# Patient Record
Sex: FEMALE | Race: WHITE | NOT HISPANIC OR LATINO | Employment: FULL TIME | URBAN - METROPOLITAN AREA
[De-identification: names, ages, dates, MRNs, and addresses within clinical notes are randomized per-mention and may not be internally consistent; named-entity substitution may affect disease eponyms.]

---

## 2023-05-10 PROBLEM — O09.43 SUPERVISION OF HIGH-RISK PREGNANCY WITH GRAND MULTIPARITY IN THIRD TRIMESTER: Status: ACTIVE | Noted: 2023-05-10

## 2023-05-15 PROBLEM — O09.299 HX OF PREECLAMPSIA, PRIOR PREGNANCY, CURRENTLY PREGNANT: Status: ACTIVE | Noted: 2022-12-06

## 2023-05-30 ENCOUNTER — ROUTINE PRENATAL (OUTPATIENT)
Dept: OBSTETRICS AND GYNECOLOGY | Facility: CLINIC | Age: 38
End: 2023-05-30

## 2023-05-30 ENCOUNTER — HOSPITAL ENCOUNTER (OUTPATIENT)
Facility: HOSPITAL | Age: 38
Discharge: HOME | End: 2023-05-30
Attending: OBSTETRICS & GYNECOLOGY | Admitting: OBSTETRICS & GYNECOLOGY

## 2023-05-30 VITALS
HEART RATE: 114 BPM | TEMPERATURE: 98.8 F | RESPIRATION RATE: 18 BRPM | WEIGHT: 215 LBS | DIASTOLIC BLOOD PRESSURE: 83 MMHG | SYSTOLIC BLOOD PRESSURE: 132 MMHG | HEIGHT: 64 IN | BODY MASS INDEX: 36.7 KG/M2 | OXYGEN SATURATION: 96 %

## 2023-05-30 VITALS — DIASTOLIC BLOOD PRESSURE: 78 MMHG | SYSTOLIC BLOOD PRESSURE: 124 MMHG | WEIGHT: 215 LBS | BODY MASS INDEX: 36.9 KG/M2

## 2023-05-30 DIAGNOSIS — O09.43 SUPERVISION OF HIGH-RISK PREGNANCY WITH GRAND MULTIPARITY IN THIRD TRIMESTER: ICD-10-CM

## 2023-05-30 DIAGNOSIS — O24.410 DIET CONTROLLED GESTATIONAL DIABETES MELLITUS (GDM) IN THIRD TRIMESTER: Primary | ICD-10-CM

## 2023-05-30 PROBLEM — R07.81 RIB PAIN: Status: ACTIVE | Noted: 2023-05-30

## 2023-05-30 LAB
ALP SERPL-CCNC: 120 U/L (ref 39–117)
ALT SERPL W P-5'-P-CCNC: 37 U/L (ref 1–33)
AST SERPL-CCNC: 31 U/L (ref 1–32)
BILIRUB SERPL-MCNC: 0.7 MG/DL (ref 0–1.2)
CREAT SERPL-MCNC: 0.48 MG/DL (ref 0.57–1)
DEPRECATED RDW RBC AUTO: 44.6 FL (ref 37–54)
ERYTHROCYTE [DISTWIDTH] IN BLOOD BY AUTOMATED COUNT: 12.9 % (ref 12.3–15.4)
EXPIRATION DATE: NORMAL
GLUCOSE BLDC GLUCOMTR-MCNC: 148 MG/DL (ref 70–130)
HCT VFR BLD AUTO: 27.7 % (ref 34–46.6)
HGB BLD-MCNC: 9.3 G/DL (ref 12–15.9)
LDH SERPL-CCNC: 148 U/L (ref 135–214)
Lab: NORMAL
MCH RBC QN AUTO: 32 PG (ref 26.6–33)
MCHC RBC AUTO-ENTMCNC: 33.6 G/DL (ref 31.5–35.7)
MCV RBC AUTO: 95.2 FL (ref 79–97)
PLATELET # BLD AUTO: 160 10*3/MM3 (ref 140–450)
PMV BLD AUTO: 11.2 FL (ref 6–12)
PROT UR STRIP-MCNC: NEGATIVE MG/DL
RBC # BLD AUTO: 2.91 10*6/MM3 (ref 3.77–5.28)
URATE SERPL-MCNC: 6.6 MG/DL (ref 2.4–5.7)
WBC NRBC COR # BLD: 11.66 10*3/MM3 (ref 3.4–10.8)

## 2023-05-30 PROCEDURE — 85027 COMPLETE CBC AUTOMATED: CPT | Performed by: OBSTETRICS & GYNECOLOGY

## 2023-05-30 PROCEDURE — G0463 HOSPITAL OUTPT CLINIC VISIT: HCPCS

## 2023-05-30 PROCEDURE — 84460 ALANINE AMINO (ALT) (SGPT): CPT | Performed by: OBSTETRICS & GYNECOLOGY

## 2023-05-30 PROCEDURE — 83615 LACTATE (LD) (LDH) ENZYME: CPT | Performed by: OBSTETRICS & GYNECOLOGY

## 2023-05-30 PROCEDURE — 59025 FETAL NON-STRESS TEST: CPT

## 2023-05-30 PROCEDURE — 84075 ASSAY ALKALINE PHOSPHATASE: CPT | Performed by: OBSTETRICS & GYNECOLOGY

## 2023-05-30 PROCEDURE — 84550 ASSAY OF BLOOD/URIC ACID: CPT | Performed by: OBSTETRICS & GYNECOLOGY

## 2023-05-30 PROCEDURE — 0502F SUBSEQUENT PRENATAL CARE: CPT | Performed by: OBSTETRICS & GYNECOLOGY

## 2023-05-30 PROCEDURE — 81002 URINALYSIS NONAUTO W/O SCOPE: CPT | Performed by: OBSTETRICS & GYNECOLOGY

## 2023-05-30 PROCEDURE — 84450 TRANSFERASE (AST) (SGOT): CPT | Performed by: OBSTETRICS & GYNECOLOGY

## 2023-05-30 PROCEDURE — 82247 BILIRUBIN TOTAL: CPT | Performed by: OBSTETRICS & GYNECOLOGY

## 2023-05-30 PROCEDURE — 82948 REAGENT STRIP/BLOOD GLUCOSE: CPT

## 2023-05-30 PROCEDURE — 36415 COLL VENOUS BLD VENIPUNCTURE: CPT | Performed by: OBSTETRICS & GYNECOLOGY

## 2023-05-30 PROCEDURE — 82565 ASSAY OF CREATININE: CPT | Performed by: OBSTETRICS & GYNECOLOGY

## 2023-05-30 NOTE — PATIENT INSTRUCTIONS
Prenatal Care  Prenatal care is health care during pregnancy. It helps you and your unborn baby (fetus) stay as healthy as possible. Prenatal care may be provided by a midwife, a family practice doctor, a mid-level practitioner (nurse practitioner or physician assistant), or a childbirth and pregnancy doctor (obstetrician).  How does this affect me?  During pregnancy, you will be closely monitored for any new conditions that might develop. To lower your risk of pregnancy complications, you and your health care provider will talk about any underlying conditions you have.  How does this affect my baby?  Early and consistent prenatal care increases the chance that your baby will be healthy during pregnancy. Prenatal care lowers the risk that your baby will be:  Born early (prematurely).  Smaller than expected at birth (small for gestational age).  What can I expect at the first prenatal care visit?  Your first prenatal care visit will likely be the longest. You should schedule your first prenatal care visit as soon as you know that you are pregnant. Your first visit is a good time to talk about any questions or concerns you have about pregnancy.  Medical history  At your visit, you and your health care provider will talk about your medical history, including:  Any past pregnancies.  Your family's medical history.  Medical history of the baby's father.  Any long-term (chronic) health conditions you have and how you manage them.  Any surgeries or procedures you have had.  Any current over-the-counter or prescription medicines, herbs, or supplements that you are taking.  Other factors that could pose a risk to your baby, including:  Exposure to harmful chemicals or radiation at work or at home.  Any substance use, including tobacco, alcohol, and drug use.  Your home setting and your stress levels, including:  Exposure to abuse or violence.  Household financial strain.  Your daily health habits, including diet and  exercise.  Tests and screenings  Your health care provider will:  Measure your weight, height, and blood pressure.  Do a physical exam, including a pelvic and breast exam.  Perform blood tests and urine tests to check for:  Urinary tract infection.  Sexually transmitted infections (STIs).  Low iron levels in your blood (anemia).  Blood type and certain proteins on red blood cells (Rh antibodies).  Infections and immunity to viruses, such as hepatitis B and rubella.  HIV (human immunodeficiency virus).  Discuss your options for genetic screening.  Tips about staying healthy  Your health care provider will also give you information about how to keep yourself and your baby healthy, including:  Nutrition and taking vitamins.  Physical activity.  How to manage pregnancy symptoms such as nausea and vomiting (morning sickness).  Infections and substances that may be harmful to your baby and how to avoid them.  Food safety.  Dental care.  Working.  Travel.  Warning signs to watch for and when to call your health care provider.  How often will I have prenatal care visits?  After your first prenatal care visit, you will have regular visits throughout your pregnancy. The visit schedule is often as follows:  Up to week 28 of pregnancy: once every 4 weeks.  28-36 weeks: once every 2 weeks.  After 36 weeks: every week until delivery.  Some women may have visits more or less often depending on any underlying health conditions and the health of the baby.  Keep all follow-up and prenatal care visits. This is important.  What happens during routine prenatal care visits?  Your health care provider will:  Measure your weight and blood pressure.  Check for fetal heart sounds.  Measure the height of your uterus in your abdomen (fundal height). This may be measured starting around week 20 of pregnancy.  Check the position of your baby inside your uterus.  Ask questions about your diet, sleeping patterns, and whether you can feel the baby  move.  Review warning signs to watch for and signs of labor.  Ask about any pregnancy symptoms you are having and how you are dealing with them. Symptoms may include:  Headaches.  Nausea and vomiting.  Vaginal discharge.  Swelling.  Fatigue.  Constipation.  Changes in your vision.  Feeling persistently sad or anxious.  Any discomfort, including back or pelvic pain.  Bleeding or spotting.  Make a list of questions to ask your health care provider at your routine visits.  What tests might I have during prenatal care visits?  You may have blood, urine, and imaging tests throughout your pregnancy, such as:  Urine tests to check for glucose, protein, or signs of infection.  Glucose tests to check for a form of diabetes that can develop during pregnancy (gestational diabetes mellitus). This is usually done around week 24 of pregnancy.  Ultrasounds to check your baby's growth and development, to check for birth defects, and to check your baby's well-being. These can also help to decide when you should deliver your baby.  A test to check for group B strep (GBS) infection. This is usually done around week 36 of pregnancy.  Genetic testing. This may include blood, fluid, or tissue sampling, or imaging tests, such as an ultrasound. Some genetic tests are done during the first trimester and some are done during the second trimester.  What else can I expect during prenatal care visits?  Your health care provider may recommend getting certain vaccines during pregnancy. These may include:  A yearly flu shot (annual influenza vaccine). This is especially important if you will be pregnant during flu season.  Tdap (tetanus, diphtheria, pertussis) vaccine. Getting this vaccine during pregnancy can protect your baby from whooping cough (pertussis) after birth. This vaccine may be recommended between weeks 27 and 36 of pregnancy.  A COVID-19 vaccine.  Later in your pregnancy, your health care provider may give you information  about:  Childbirth and breastfeeding classes.  Choosing a health care provider for your baby.  Umbilical cord banking.  Breastfeeding.  Birth control after your baby is born.  The hospital labor and delivery unit and how to set up a tour.  Registering at the hospital before you go into labor.  Where to find more information  Office on Women's Health: womenshealth.gov  American Pregnancy Association: americanpregnancy.org  March of Dimes: marchofdimes.org  Summary  Prenatal care helps you and your baby stay as healthy as possible during pregnancy.  Your first prenatal care visit will most likely be the longest.  You will have visits and tests throughout your pregnancy to monitor your health and your baby's health.  Bring a list of questions to your visits to ask your health care provider.  Make sure to keep all follow-up and prenatal care visits.  This information is not intended to replace advice given to you by your health care provider. Make sure you discuss any questions you have with your health care provider.  Document Revised: 09/30/2021 Document Reviewed: 09/30/2021  [employer] Patient Education © 2022 [employer]

## 2023-05-30 NOTE — PROGRESS NOTES
OB FOLLOW UP  CC- Here for care of pregnancy        Karen Garrido is a 37 y.o.  38w1d patient being seen today for her obstetrical follow up visit. Patient reports Headache that is sometimes relieved by Tylenol or rest, she denies vision changes. She reports sharp chest pain on exhale under her ribs, lower back pain, denies dysuria. Patient reports pelvic pain. Patient reports nausea and heartburn but not currently taking medications.     Her prenatal care is complicated by (and status) : AMA, Chronic HTN. Her average BP has been 140-90s . and GDM diet controlled. Her average fasting BG is 80-90. Her average 2hr PP BG is 90s  Patient Active Problem List   Diagnosis   • Diet controlled gestational diabetes mellitus (GDM) in third trimester   • Gestational hypertension, third trimester   • Supervision of high-risk pregnancy with grand multiparity in third trimester   • Hx of preeclampsia, prior pregnancy, currently pregnant   • Gestational hypertension, third trimester       GBS Status:   Group B Strep Culture   Date Value Ref Range Status   2023 No Group B Streptococcus isolated  Final         No Known Allergies       Her Delivery Plan is: IOL scheduled 23, info given    US today: no  Non Stress Test: Yes minutes 20  non-stress test: NST: Reactive  indication: AMA, CHTN, GDM diet controlled  category: Category I  attestation: I have personally reviewed NST      ROS -   Patient Reports : Headaches, Nausea and pelvic pain  Patient Denies: Loss of Fluid, Vaginal Spotting, Vision Changes, Vomiting , Contractions and Epigastric pain  Fetal Movement : normal  All other systems reviewed and are negative.       The additional following portions of the patient's history were reviewed and updated as appropriate: allergies and current medications.    I have reviewed and agree with the HPI, ROS, and historical information as entered above. Karlos Dias MD        EXAM:     Prenatal Vitals  BP:  124/78  Weight: 97.5 kg (215 lb)                          Assessment and Plan    Problem List Items Addressed This Visit        Endocrine and Metabolic    Diet controlled gestational diabetes mellitus (GDM) in third trimester - Primary       Gravid and     Supervision of high-risk pregnancy with grand multiparity in third trimester       1. Pregnancy at 38w1d  2. Fetal status reassuring.   3. Reviewed Pre-eclampsia signs/symptoms  4. Discussed IOL options with patient. Pt. desires IOL at 39 weeks.   5. Delivery options reviewed with patient  6. Signs of labor reviewed  7. Kick counts reviewed  8. Activity and Exercise discussed.  Return for Continue twice weekly NSTs.    Karlos Dias MD  2023

## 2023-05-31 NOTE — H&P
"Karen Garrido  1985  [id number]  [id number]    CC: pain in ribs below both breasts  HPI:  Patient is 37 y.o. female   currently at 38w1d presents with c/o bilat rib pain, onset last night at ~1800, constant, stabbing, worse with deep inspiration of rotational trunk movement.  Pt denies fever,  But has c/o cough for ~1 week in the am only.  Pt denies contractions, bleeding, or leaking.  Good FM.  PNC comp by gest HTN (no meds) and gest DM (diet controlled).  Pt with hx of preeclampsia with prior pregnancy     PMH:  Current meds: PNV  Illnesses: none  Surgeries: none  Allergies: NKDA    Past OB History:       OB History    Para Term  AB Living   5 3 3 0 1 3   SAB IAB Ectopic Molar Multiple Live Births   1 0 0 0 0 3      # Outcome Date GA Lbr Noah/2nd Weight Sex Delivery Anes PTL Lv   5 Current            4 Term 16 37w4d  3260 g (7 lb 3 oz) M Vag-Spont EPI  JONN      Complications: Pregnancy-induced hypertension   3 Term 04/04/15 38w2d  3459 g (7 lb 10 oz) F Vag-Spont EPI  JONN      Complications: Pregnancy-induced hypertension   2 SAB 2013           1 Term 07/08/10 39w0d  2977 g (6 lb 9 oz) M Vag-Spont EPI  JONN      Complications: Pregnancy-induced hypertension            SH: tob neg , EtOH neg, drugs neg    General ROS: rib pain, edema, N.   All other systems reviewed and are negative.      Physical Examination: General appearance - alert, well appearing, and in no distress  Vital signs - /83   Pulse 114   Temp 98.8 °F (37.1 °C) (Oral)   Resp 18   Ht 162.6 cm (64\")   Wt 97.5 kg (215 lb)   LMP 2022   SpO2 96%   BMI 36.90 kg/m²   HEENT: normocephalic, atraumatic,oropharynx clear, appearance of ears and nose normal  Neck - supple, no significant adenopathy, no thyromegaly  Lymphatics - no palpable lymphadenopathy in the neck or groin, no hepatosplenomegaly  Chest - clear to auscultation, no wheezes, rales or rhonchi, respiratory effort non-labored   Pt with point " Are all the tests needed?      Yes.  They are needed before we proceed with the cochlear implant.  She is also worried about the testing with the hearing aid.  What if her hearing aid isn't working right.    We would use a clinic loaner for the testing.  There is no reason we can;t get the testing done.  She will go ahead with the CT and MRI on Monday.    Patient scheduled for an MRI on 12/3/18. Creatinine needed prior to exam. Please place order.    Thank you     tenderness in intercostal regions bilaterally below each breast  Heart - normal rate, regular rhythm, no murmurs, rubs, clicks or gallops, no JVD, 1-2+ lower extremity edema  Abdomen - soft, nontender, nondistended, no masses, no hepatosplenomegaly  no rebound tenderness noted, bowel sounds normal  Vaginal Exam: deferred ,external genitalia normal  Extremities - 1-2+ pedal edema noted, no calf tend  Skin -warm and dry, normal coloration and turgor, no rashes, no suspicious skin lesions noted      Labs:  Results from last 7 days   Lab Units 05/30/23  1909   WBC 10*3/mm3 11.66*   HEMOGLOBIN g/dL 9.3*   HEMATOCRIT % 27.7*   PLATELETS 10*3/mm3 160     Results from last 7 days   Lab Units 05/30/23  1909   CREATININE mg/dL 0.48*   BILIRUBIN mg/dL 0.7   ALK PHOS U/L 120*   ALT (SGPT) U/L 37*   AST (SGOT) U/L 31       Fetal monitoring: indication rib pain , onset 1800 , offset 2223 , baseline 145-150 , mod BTB variability , multiple accels (15 X 15), no decels, rare contractions, interpretation reactive NST    Radiology     Assessment 1)IUP 38 1/7 weeks   2)rib pain- O2 sats 95-98% (RA) with RR 18.  Pain c/w costochondritis   3)hx preeclampsia- uric acid slightly elevated (6.6), ALT 37, plt 160k, BP's with mult readings    In 90's.   4)gest HTN    Plan 1)pt was sent home prior to my complete chart review.  Pt has been called by me and our    Charge nurse by the only phone listed in her records.  I have left a message to call back and   That she needs to be delivered.  Pt has sched appt this Thurs with Dr. Dias.    Nestor Malin MD  5/30/2023  21:33 EDT

## 2023-06-01 ENCOUNTER — TELEPHONE (OUTPATIENT)
Dept: OBSTETRICS AND GYNECOLOGY | Facility: CLINIC | Age: 38
End: 2023-06-01

## 2023-06-01 ENCOUNTER — PREP FOR SURGERY (OUTPATIENT)
Dept: OTHER | Facility: HOSPITAL | Age: 38
End: 2023-06-01

## 2023-06-01 ENCOUNTER — HOSPITAL ENCOUNTER (INPATIENT)
Facility: HOSPITAL | Age: 38
LOS: 3 days | Discharge: HOME | End: 2023-06-04
Attending: OBSTETRICS & GYNECOLOGY | Admitting: OBSTETRICS & GYNECOLOGY
Payer: COMMERCIAL

## 2023-06-01 DIAGNOSIS — O24.410 DIET CONTROLLED GESTATIONAL DIABETES MELLITUS (GDM) IN THIRD TRIMESTER: ICD-10-CM

## 2023-06-01 DIAGNOSIS — O24.410 DIET CONTROLLED GESTATIONAL DIABETES MELLITUS (GDM) IN THIRD TRIMESTER: Primary | ICD-10-CM

## 2023-06-01 PROBLEM — Z34.90 PREGNANCY: Status: ACTIVE | Noted: 2023-06-01

## 2023-06-01 LAB
ABO GROUP BLD: NORMAL
ALP SERPL-CCNC: 128 U/L (ref 39–117)
ALT SERPL W P-5'-P-CCNC: 34 U/L (ref 1–33)
AST SERPL-CCNC: 28 U/L (ref 1–32)
BILIRUB SERPL-MCNC: 0.7 MG/DL (ref 0–1.2)
BLD GP AB SCN SERPL QL: NEGATIVE
CREAT SERPL-MCNC: 0.53 MG/DL (ref 0.57–1)
DEPRECATED RDW RBC AUTO: 45.1 FL (ref 37–54)
ERYTHROCYTE [DISTWIDTH] IN BLOOD BY AUTOMATED COUNT: 13.1 % (ref 12.3–15.4)
GLUCOSE BLDC GLUCOMTR-MCNC: 112 MG/DL (ref 70–130)
HCT VFR BLD AUTO: 28 % (ref 34–46.6)
HGB BLD-MCNC: 9.5 G/DL (ref 12–15.9)
LDH SERPL-CCNC: 180 U/L (ref 135–214)
MCH RBC QN AUTO: 32.5 PG (ref 26.6–33)
MCHC RBC AUTO-ENTMCNC: 33.9 G/DL (ref 31.5–35.7)
MCV RBC AUTO: 95.9 FL (ref 79–97)
PLATELET # BLD AUTO: 175 10*3/MM3 (ref 140–450)
PMV BLD AUTO: 11.5 FL (ref 6–12)
RBC # BLD AUTO: 2.92 10*6/MM3 (ref 3.77–5.28)
RH BLD: POSITIVE
T&S EXPIRATION DATE: NORMAL
URATE SERPL-MCNC: 5.7 MG/DL (ref 2.4–5.7)
WBC NRBC COR # BLD: 8.27 10*3/MM3 (ref 3.4–10.8)

## 2023-06-01 PROCEDURE — 3E033VJ INTRODUCTION OF OTHER HORMONE INTO PERIPHERAL VEIN, PERCUTANEOUS APPROACH: ICD-10-PCS | Performed by: OBSTETRICS & GYNECOLOGY

## 2023-06-01 PROCEDURE — 86900 BLOOD TYPING SEROLOGIC ABO: CPT | Performed by: OBSTETRICS & GYNECOLOGY

## 2023-06-01 PROCEDURE — 84550 ASSAY OF BLOOD/URIC ACID: CPT | Performed by: OBSTETRICS & GYNECOLOGY

## 2023-06-01 PROCEDURE — 84450 TRANSFERASE (AST) (SGOT): CPT | Performed by: OBSTETRICS & GYNECOLOGY

## 2023-06-01 PROCEDURE — 84460 ALANINE AMINO (ALT) (SGPT): CPT | Performed by: OBSTETRICS & GYNECOLOGY

## 2023-06-01 PROCEDURE — 85027 COMPLETE CBC AUTOMATED: CPT | Performed by: OBSTETRICS & GYNECOLOGY

## 2023-06-01 PROCEDURE — 59200 INSERT CERVICAL DILATOR: CPT | Performed by: OBSTETRICS & GYNECOLOGY

## 2023-06-01 PROCEDURE — 86850 RBC ANTIBODY SCREEN: CPT | Performed by: OBSTETRICS & GYNECOLOGY

## 2023-06-01 PROCEDURE — 86901 BLOOD TYPING SEROLOGIC RH(D): CPT | Performed by: OBSTETRICS & GYNECOLOGY

## 2023-06-01 PROCEDURE — 84075 ASSAY ALKALINE PHOSPHATASE: CPT | Performed by: OBSTETRICS & GYNECOLOGY

## 2023-06-01 PROCEDURE — 82565 ASSAY OF CREATININE: CPT | Performed by: OBSTETRICS & GYNECOLOGY

## 2023-06-01 PROCEDURE — 82948 REAGENT STRIP/BLOOD GLUCOSE: CPT

## 2023-06-01 PROCEDURE — 59025 FETAL NON-STRESS TEST: CPT

## 2023-06-01 PROCEDURE — 82247 BILIRUBIN TOTAL: CPT | Performed by: OBSTETRICS & GYNECOLOGY

## 2023-06-01 PROCEDURE — 83615 LACTATE (LD) (LDH) ENZYME: CPT | Performed by: OBSTETRICS & GYNECOLOGY

## 2023-06-01 RX ORDER — PROMETHAZINE HYDROCHLORIDE 12.5 MG/1
12.5 TABLET ORAL EVERY 6 HOURS PRN
Status: DISCONTINUED | OUTPATIENT
Start: 2023-06-01 | End: 2023-06-02 | Stop reason: HOSPADM

## 2023-06-01 RX ORDER — CARBOPROST TROMETHAMINE 250 UG/ML
250 INJECTION, SOLUTION INTRAMUSCULAR AS NEEDED
Status: CANCELLED | OUTPATIENT
Start: 2023-06-01

## 2023-06-01 RX ORDER — PROMETHAZINE HYDROCHLORIDE 12.5 MG/1
12.5 SUPPOSITORY RECTAL EVERY 6 HOURS PRN
Status: CANCELLED | OUTPATIENT
Start: 2023-06-01

## 2023-06-01 RX ORDER — IBUPROFEN 600 MG/1
600 TABLET ORAL EVERY 6 HOURS PRN
Status: DISCONTINUED | OUTPATIENT
Start: 2023-06-01 | End: 2023-06-02 | Stop reason: HOSPADM

## 2023-06-01 RX ORDER — SODIUM CHLORIDE 0.9 % (FLUSH) 0.9 %
10 SYRINGE (ML) INJECTION EVERY 12 HOURS SCHEDULED
Status: DISCONTINUED | OUTPATIENT
Start: 2023-06-01 | End: 2023-06-02 | Stop reason: HOSPADM

## 2023-06-01 RX ORDER — FENTANYL CITRATE 50 UG/ML
50 INJECTION, SOLUTION INTRAMUSCULAR; INTRAVENOUS
Status: DISCONTINUED | OUTPATIENT
Start: 2023-06-01 | End: 2023-06-02 | Stop reason: HOSPADM

## 2023-06-01 RX ORDER — ONDANSETRON 4 MG/1
4 TABLET, FILM COATED ORAL EVERY 6 HOURS PRN
Status: DISCONTINUED | OUTPATIENT
Start: 2023-06-01 | End: 2023-06-01 | Stop reason: SDUPTHER

## 2023-06-01 RX ORDER — PROMETHAZINE HYDROCHLORIDE 12.5 MG/1
12.5 TABLET ORAL EVERY 6 HOURS PRN
Status: CANCELLED | OUTPATIENT
Start: 2023-06-01

## 2023-06-01 RX ORDER — ONDANSETRON 4 MG/1
4 TABLET, FILM COATED ORAL EVERY 6 HOURS PRN
Status: CANCELLED | OUTPATIENT
Start: 2023-06-01

## 2023-06-01 RX ORDER — ACETAMINOPHEN 325 MG/1
650 TABLET ORAL EVERY 4 HOURS PRN
Status: DISCONTINUED | OUTPATIENT
Start: 2023-06-01 | End: 2023-06-01 | Stop reason: SDUPTHER

## 2023-06-01 RX ORDER — OXYTOCIN/0.9 % SODIUM CHLORIDE 30/500 ML
250 PLASTIC BAG, INJECTION (ML) INTRAVENOUS CONTINUOUS
Status: CANCELLED | OUTPATIENT
Start: 2023-06-01 | End: 2023-06-01

## 2023-06-01 RX ORDER — FENTANYL CITRATE 50 UG/ML
50 INJECTION, SOLUTION INTRAMUSCULAR; INTRAVENOUS
Status: DISCONTINUED | OUTPATIENT
Start: 2023-06-01 | End: 2023-06-01 | Stop reason: SDUPTHER

## 2023-06-01 RX ORDER — SODIUM CHLORIDE 0.9 % (FLUSH) 0.9 %
1-10 SYRINGE (ML) INJECTION AS NEEDED
Status: DISCONTINUED | OUTPATIENT
Start: 2023-06-01 | End: 2023-06-02 | Stop reason: HOSPADM

## 2023-06-01 RX ORDER — ONDANSETRON 2 MG/ML
4 INJECTION INTRAMUSCULAR; INTRAVENOUS EVERY 6 HOURS PRN
Status: CANCELLED | OUTPATIENT
Start: 2023-06-01

## 2023-06-01 RX ORDER — TRISODIUM CITRATE DIHYDRATE AND CITRIC ACID MONOHYDRATE 500; 334 MG/5ML; MG/5ML
30 SOLUTION ORAL ONCE AS NEEDED
Status: DISCONTINUED | OUTPATIENT
Start: 2023-06-01 | End: 2023-06-02 | Stop reason: HOSPADM

## 2023-06-01 RX ORDER — PROMETHAZINE HYDROCHLORIDE 12.5 MG/1
12.5 TABLET ORAL EVERY 6 HOURS PRN
Status: DISCONTINUED | OUTPATIENT
Start: 2023-06-01 | End: 2023-06-01 | Stop reason: SDUPTHER

## 2023-06-01 RX ORDER — SODIUM CHLORIDE, SODIUM LACTATE, POTASSIUM CHLORIDE, CALCIUM CHLORIDE 600; 310; 30; 20 MG/100ML; MG/100ML; MG/100ML; MG/100ML
125 INJECTION, SOLUTION INTRAVENOUS CONTINUOUS
Status: DISCONTINUED | OUTPATIENT
Start: 2023-06-01 | End: 2023-06-04 | Stop reason: HOSPADM

## 2023-06-01 RX ORDER — ONDANSETRON 2 MG/ML
4 INJECTION INTRAMUSCULAR; INTRAVENOUS EVERY 6 HOURS PRN
Status: DISCONTINUED | OUTPATIENT
Start: 2023-06-01 | End: 2023-06-02 | Stop reason: HOSPADM

## 2023-06-01 RX ORDER — FENTANYL CITRATE 50 UG/ML
50 INJECTION, SOLUTION INTRAMUSCULAR; INTRAVENOUS
Status: CANCELLED | OUTPATIENT
Start: 2023-06-01 | End: 2023-06-08

## 2023-06-01 RX ORDER — MISOPROSTOL 200 UG/1
800 TABLET ORAL AS NEEDED
Status: CANCELLED | OUTPATIENT
Start: 2023-06-01

## 2023-06-01 RX ORDER — MISOPROSTOL 200 UG/1
800 TABLET ORAL AS NEEDED
Status: DISCONTINUED | OUTPATIENT
Start: 2023-06-01 | End: 2023-06-02 | Stop reason: HOSPADM

## 2023-06-01 RX ORDER — HYDROCODONE BITARTRATE AND ACETAMINOPHEN 5; 325 MG/1; MG/1
1 TABLET ORAL EVERY 4 HOURS PRN
Status: CANCELLED | OUTPATIENT
Start: 2023-06-01 | End: 2023-06-11

## 2023-06-01 RX ORDER — OXYTOCIN/0.9 % SODIUM CHLORIDE 30/500 ML
999 PLASTIC BAG, INJECTION (ML) INTRAVENOUS ONCE
Status: DISCONTINUED | OUTPATIENT
Start: 2023-06-01 | End: 2023-06-02 | Stop reason: HOSPADM

## 2023-06-01 RX ORDER — LIDOCAINE HYDROCHLORIDE 10 MG/ML
5 INJECTION, SOLUTION EPIDURAL; INFILTRATION; INTRACAUDAL; PERINEURAL AS NEEDED
Status: DISCONTINUED | OUTPATIENT
Start: 2023-06-01 | End: 2023-06-02 | Stop reason: HOSPADM

## 2023-06-01 RX ORDER — TRISODIUM CITRATE DIHYDRATE AND CITRIC ACID MONOHYDRATE 500; 334 MG/5ML; MG/5ML
30 SOLUTION ORAL ONCE AS NEEDED
Status: CANCELLED | OUTPATIENT
Start: 2023-06-01

## 2023-06-01 RX ORDER — PROMETHAZINE HYDROCHLORIDE 12.5 MG/1
12.5 SUPPOSITORY RECTAL EVERY 6 HOURS PRN
Status: DISCONTINUED | OUTPATIENT
Start: 2023-06-01 | End: 2023-06-02 | Stop reason: HOSPADM

## 2023-06-01 RX ORDER — TERBUTALINE SULFATE 1 MG/ML
0.25 INJECTION, SOLUTION SUBCUTANEOUS AS NEEDED
Status: CANCELLED | OUTPATIENT
Start: 2023-06-01

## 2023-06-01 RX ORDER — ACETAMINOPHEN 325 MG/1
650 TABLET ORAL EVERY 4 HOURS PRN
Status: CANCELLED | OUTPATIENT
Start: 2023-06-01

## 2023-06-01 RX ORDER — SODIUM CHLORIDE, SODIUM LACTATE, POTASSIUM CHLORIDE, CALCIUM CHLORIDE 600; 310; 30; 20 MG/100ML; MG/100ML; MG/100ML; MG/100ML
125 INJECTION, SOLUTION INTRAVENOUS CONTINUOUS
Status: CANCELLED | OUTPATIENT
Start: 2023-06-01

## 2023-06-01 RX ORDER — OXYTOCIN/0.9 % SODIUM CHLORIDE 30/500 ML
2-20 PLASTIC BAG, INJECTION (ML) INTRAVENOUS
Status: CANCELLED | OUTPATIENT
Start: 2023-06-01

## 2023-06-01 RX ORDER — METHYLERGONOVINE MALEATE 0.2 MG/ML
200 INJECTION INTRAVENOUS ONCE AS NEEDED
Status: DISCONTINUED | OUTPATIENT
Start: 2023-06-01 | End: 2023-06-02 | Stop reason: HOSPADM

## 2023-06-01 RX ORDER — MAGNESIUM CARB/ALUMINUM HYDROX 105-160MG
30 TABLET,CHEWABLE ORAL ONCE
Status: DISCONTINUED | OUTPATIENT
Start: 2023-06-01 | End: 2023-06-02 | Stop reason: HOSPADM

## 2023-06-01 RX ORDER — CARBOPROST TROMETHAMINE 250 UG/ML
250 INJECTION, SOLUTION INTRAMUSCULAR AS NEEDED
Status: DISCONTINUED | OUTPATIENT
Start: 2023-06-01 | End: 2023-06-02 | Stop reason: HOSPADM

## 2023-06-01 RX ORDER — SODIUM CHLORIDE 0.9 % (FLUSH) 0.9 %
10 SYRINGE (ML) INJECTION EVERY 12 HOURS SCHEDULED
Status: CANCELLED | OUTPATIENT
Start: 2023-06-01

## 2023-06-01 RX ORDER — METHYLERGONOVINE MALEATE 0.2 MG/ML
200 INJECTION INTRAVENOUS ONCE AS NEEDED
Status: CANCELLED | OUTPATIENT
Start: 2023-06-01

## 2023-06-01 RX ORDER — ONDANSETRON 4 MG/1
4 TABLET, FILM COATED ORAL EVERY 6 HOURS PRN
Status: DISCONTINUED | OUTPATIENT
Start: 2023-06-01 | End: 2023-06-02 | Stop reason: HOSPADM

## 2023-06-01 RX ORDER — ONDANSETRON 2 MG/ML
4 INJECTION INTRAMUSCULAR; INTRAVENOUS EVERY 6 HOURS PRN
Status: DISCONTINUED | OUTPATIENT
Start: 2023-06-01 | End: 2023-06-01 | Stop reason: SDUPTHER

## 2023-06-01 RX ORDER — OXYTOCIN/0.9 % SODIUM CHLORIDE 30/500 ML
999 PLASTIC BAG, INJECTION (ML) INTRAVENOUS ONCE
Status: CANCELLED | OUTPATIENT
Start: 2023-06-01 | End: 2023-06-01

## 2023-06-01 RX ORDER — HYDROCODONE BITARTRATE AND ACETAMINOPHEN 5; 325 MG/1; MG/1
1 TABLET ORAL EVERY 4 HOURS PRN
Status: DISCONTINUED | OUTPATIENT
Start: 2023-06-01 | End: 2023-06-02 | Stop reason: HOSPADM

## 2023-06-01 RX ORDER — PROMETHAZINE HYDROCHLORIDE 12.5 MG/1
12.5 SUPPOSITORY RECTAL EVERY 6 HOURS PRN
Status: DISCONTINUED | OUTPATIENT
Start: 2023-06-01 | End: 2023-06-01 | Stop reason: SDUPTHER

## 2023-06-01 RX ORDER — OXYTOCIN/0.9 % SODIUM CHLORIDE 30/500 ML
250 PLASTIC BAG, INJECTION (ML) INTRAVENOUS CONTINUOUS
Status: ACTIVE | OUTPATIENT
Start: 2023-06-01 | End: 2023-06-01

## 2023-06-01 RX ORDER — TERBUTALINE SULFATE 1 MG/ML
0.25 INJECTION, SOLUTION SUBCUTANEOUS AS NEEDED
Status: DISCONTINUED | OUTPATIENT
Start: 2023-06-01 | End: 2023-06-02 | Stop reason: HOSPADM

## 2023-06-01 RX ORDER — SODIUM CHLORIDE 0.9 % (FLUSH) 0.9 %
1-10 SYRINGE (ML) INJECTION AS NEEDED
Status: CANCELLED | OUTPATIENT
Start: 2023-06-01

## 2023-06-01 RX ORDER — MAGNESIUM CARB/ALUMINUM HYDROX 105-160MG
30 TABLET,CHEWABLE ORAL ONCE
Status: CANCELLED | OUTPATIENT
Start: 2023-06-01 | End: 2023-06-01

## 2023-06-01 RX ORDER — ACETAMINOPHEN 325 MG/1
650 TABLET ORAL EVERY 4 HOURS PRN
Status: DISCONTINUED | OUTPATIENT
Start: 2023-06-01 | End: 2023-06-02 | Stop reason: HOSPADM

## 2023-06-01 RX ORDER — LIDOCAINE HYDROCHLORIDE 10 MG/ML
5 INJECTION, SOLUTION EPIDURAL; INFILTRATION; INTRACAUDAL; PERINEURAL AS NEEDED
Status: CANCELLED | OUTPATIENT
Start: 2023-06-01

## 2023-06-01 RX ORDER — OXYTOCIN/0.9 % SODIUM CHLORIDE 30/500 ML
2-20 PLASTIC BAG, INJECTION (ML) INTRAVENOUS
Status: DISCONTINUED | OUTPATIENT
Start: 2023-06-01 | End: 2023-06-02 | Stop reason: HOSPADM

## 2023-06-01 RX ORDER — IBUPROFEN 600 MG/1
600 TABLET ORAL EVERY 6 HOURS PRN
Status: CANCELLED | OUTPATIENT
Start: 2023-06-01

## 2023-06-01 RX ADMIN — Medication 2 MILLI-UNITS/MIN: at 21:53

## 2023-06-01 RX ADMIN — SODIUM CHLORIDE, POTASSIUM CHLORIDE, SODIUM LACTATE AND CALCIUM CHLORIDE 125 ML/HR: 600; 310; 30; 20 INJECTION, SOLUTION INTRAVENOUS at 21:53

## 2023-06-01 NOTE — H&P
[facility] [place]    Referring Provider: No ref. provider found      Cc: elevated blood pressure    Subjective     Patient is a 38 y.o. female  currently at 38w3d, who presents for IOL due to gestational hypertension.     Her prenatal care is complicated by A1GD and newly diagnosed GHTN.  She transferred care from  at 35 weeks due to their unwillingness to deliver her prior to 39 week. She has been followed with serial labs and BP checks and twice weekly NSTs in our office.  She has recently noted elevated BP at home being >140/90 and was found to have elevated diastolic BP along with abnormal labs in triage 2 nights ago.  Her previous obstetric/gynecological history is noted for is remarkable for prior vaginal deliveries and history of preeclampsia.    The following portions of the patients history were reviewed and updated as appropriate: PMHx, PSHx, MEDs, Social Hx, Vitals, Labs, ROS .       Prenatal Information:   Maternal Prenatal Labs  Blood Type No results found for: ABO   Rh Status No results found for: RH   Antibody Screen No results found for: ABSCRN   Gonnorhea No results found for: GCCX   Chlamydia No results found for: CLAMYDCU   RPR No results found for: RPR   Syphilis Antibody No results found for: SYPHILIS   Rubella Rubella IgG Scr Interp   Date Value Ref Range Status   2015 Immune  Final     Comment:     Reference ranges:       Result                        Interpretation       <5.0             IU/ml      Non-immune        5.0-9.9         IU/ml      Indeterminate       >9.9             IU/ml      Immune        Hepatitis B Surface Antigen Hepatitis B Surface Ag   Date Value Ref Range Status   2022 Negative Negative Final      HIV-1 Antibody No results found for: LABHIV1   Hepatitis C Antibody No results found for: HEPCAB   Rapid Urin Drug Screen Barbiturates Screen, Urine   Date Value Ref Range Status   2023 Negative Ipceqs=319 ng/mL Final      Benzodiazepine Screen, Urine   Date Value Ref Range Status   05/22/2023 Negative Nirnar=902 ng/mL Final     Methadone Screen, Urine   Date Value Ref Range Status   05/22/2023 Negative Serpyo=456 ng/mL Final     Cocaine Screen, Urine   Date Value Ref Range Status   12/01/2015 Negative Negative ng/mL Final     Amphetamine, Urine Qual   Date Value Ref Range Status   05/22/2023 Negative Dymyeb=7701 ng/mL Final     Propoxyphene Screen   Date Value Ref Range Status   05/22/2023 Negative Vphjlr=902 ng/mL Final     Buprenorphine, Screen, Urine   Date Value Ref Range Status   12/01/2015 Negative Negative ng/mL Final     Methamphetamine, Urine   Date Value Ref Range Status   12/01/2015 Negative Negative ng/mL Final     Oxycodone Screen, Urine   Date Value Ref Range Status   12/01/2015 Negative Negative ng/mL Final      Group B Strep Culture No results found for: GBSANTIGEN           External Prenatal Results     Pregnancy Outside Results - Transcribed From Office Records - See Scanned Records For Details     Test Value Date Time    ABO       Rh       Antibody Screen       Varicella IgG       Rubella       Hgb  9.3 g/dL 05/30/23 1909       10.5 g/dL 05/10/23 1147      ^ 10.7 g/dL 04/17/23 1442      ^ 11.2 g/dL 03/20/23 0848      ^ 12.2 g/dL 11/14/22 1713    Hct  27.7 % 05/30/23 1909       30.7 % 05/10/23 1147      ^ 31.8 % 04/17/23 1442      ^ 34.0 % 03/20/23 0848      ^ 36.0 % 11/14/22 1713    Glucose Fasting GTT       Glucose Tolerance Test 1 hour       Glucose Tolerance Test 3 hour       Gonorrhea (discrete)  Negative  12/01/15 1034    Chlamydia (discrete)       RPR       VDRL       Syphilis Antibody       HBsAg ^ Negative  11/14/22 1713    Herpes Simplex Virus PCR       Herpes Simplex VIrus Culture       HIV ^ Nonreactive  11/14/22 1713    Hep C RNA Quant PCR       Hep C Antibody       AFP       Group B Strep  No Group B Streptococcus isolated  05/18/23 1816    GBS Susceptibility to Clindamycin       GBS Susceptibility  to Erythromycin       Fetal Fibronectin       Genetic Testing, Maternal Blood             Drug Screening     Test Value Date Time    Urine Drug Screen       Amphetamine Screen  Negative ng/mL 23 1100    Barbiturate Screen  Negative ng/mL 23 1100    Benzodiazepine Screen  Negative ng/mL 23 1100    Methadone Screen  Negative ng/mL 23 1100    Phencyclidine Screen  Negative ng/mL 23 1100    Opiates Screen       THC Screen       Cocaine Screen       Propoxyphene Screen  Negative ng/mL 23 1100    Buprenorphine Screen  Negative ng/mL 12/01/15 1034    Methamphetamine Screen       Oxycodone Screen  Negative ng/mL 12/01/15 1034    Tricyclic Antidepressants Screen             Legend    ^: Historical                          Past OB History:       OB History    Para Term  AB Living   5 3 3 0 1 3   SAB IAB Ectopic Molar Multiple Live Births   1 0 0 0 0 3      # Outcome Date GA Lbr Noah/2nd Weight Sex Delivery Anes PTL Lv   5 Current            4 Term 16 37w4d  3260 g (7 lb 3 oz) M Vag-Spont EPI  JONN      Complications: Pregnancy-induced hypertension   3 Term 04/04/15 38w2d  3459 g (7 lb 10 oz) F Vag-Spont EPI  JONN      Complications: Pregnancy-induced hypertension   2 SAB 2013           1 Term 07/08/10 39w0d  2977 g (6 lb 9 oz) M Vag-Spont EPI  JONN      Complications: Pregnancy-induced hypertension       Past Medical History: Past Medical History:   Diagnosis Date   • Anemia 2022   • Anxiety    • Chronic hypertension    • Gestational diabetes    • Gestational hypertension    • History of pre-eclampsia 2015   • History of recurrent UTIs    • Pulmonary edema 2015      Past Surgical History Past Surgical History:   Procedure Laterality Date   • NO PAST SURGERIES        Family History: Family History   Problem Relation Age of Onset   • Diabetes type II Father    • Hypertension Father    • Hypertension Mother    • Other Son         bifid uvula and polydactyly   •  Breast cancer Neg Hx    • Colon cancer Neg Hx    • Ovarian cancer Neg Hx       Social History:  reports that she has never smoked. She does not have any smokeless tobacco history on file.   reports that she does not currently use alcohol after a past usage of about 2.0 standard drinks per week.   reports no history of drug use.                     Review of Systems   Constitutional: Negative.   HENT: Negative.    Cardiovascular: Negative.    Respiratory: Negative.    Endocrine: Negative.    Hematologic/Lymphatic: Negative.    Skin: Negative.    Musculoskeletal: Negative.    Gastrointestinal: Negative.    Genitourinary: Negative.    Psychiatric/Behavioral: Negative.         All other systems have been reviewed and are neg  Objective       Vital Signs Range for the last 24 hours  Temperature:     Temp Source:     BP:     Pulse:     Respirations:     SPO2:     O2 Amount (l/min):     O2 Devices     Weight:       Physical Examination:   General:  No apparent distress   Skin:   clean, dry, and intact   HEENT:  Normocephalic atromatic   Lungs:   clear to auscultation bilaterally   Heart:  Regular rate and rhythm   Abdomen:  gravid, non-tender to palpation   Lower Extremities No clubbing, cyanosis, edema   Pelvis:  Deferred         Presentation: Vertex   Cervix: Exam by:     Dilation:     Effacement:     Station:         Fetal Heart Rate Assessment   Method:     Beats/min:     Baseline:     Varibility:     Accels:     Decels:     Tracing Category:       Uterine Assessment   Method:     Frequency (min):     Ctx Count in 10 min:     Duration:     Intensity:     Intensity by IUPC:     Resting Tone:     Resting Tone by IUPC:     [place] Units:       Laboratory Results:   Lab Results (last 24 hours)     ** No results found for the last 24 hours. **        Radiology Review:   Imaging Results (Last 24 Hours)     ** No results found for the last 24 hours. **        Other Studies:    Assessment & Plan       * No active hospital  problems. *        Assessment:  1.  Intrauterine pregnancy at 38w3d weeks gestation with reactive fetal status.    2.  A1GDM  3. Gestational hypertension  4. GBS negative     Plan:  1. Due to newly diagnosed GHTN while in triage 2 nights ago, will proceed with IOL since she is beyond 37 weeks gestation. 2. Plan of care has been reviewed with patient.  3.  Risks, benefits of treatment plan has been discussed.  4.  All questihave  been answered.        Karlos Dias MD  6/1/2023  14:00 EDT

## 2023-06-01 NOTE — PROGRESS NOTES
38 y.o. -0-1-3 at 38 weeks 3 days stay  OB History        5    Para   3    Term   3       0    AB   1    Living   3       SAB   1    IAB   0    Ectopic   0    Molar   0    Multiple   0    Live Births   3             Presents as an induction of labor due to AMA, gestational hypertension, and rigid cervix  Her primary OB requests a Hdz Bulb placement to initiate the induction of labor.    Fetal Heart Rate Assessment   Method:     Beats/min:     Baseline:     Varibility:     Accels:     Decels:     Tracing Category:       TOCO  SVE1+/50/-2     A Hdz Bulb was placed without difficulties with 60 mL of sterile saline.  The patient tolerated the procedure well.

## 2023-06-01 NOTE — TELEPHONE ENCOUNTER
"Called patient and she was very upset about being discharged from labor and delivery the night before last despite her \"high blood pressures\". She was scheduled to come in the office today with NST and cancelled it. She complains of decreased fetal movement today, occasional contractions, headache 8/10 after taking tylenol, blurry vision, shortness of breath when taking deep breaths, and right upper quadrant pain. She denies leaking or bleeding at this time. Instructed to go to labor and delivery immediately. Dr. Dias made aware. Labor and delivery expecting aware of her on her way.   "

## 2023-06-01 NOTE — TELEPHONE ENCOUNTER
Patient is calling to go over her Preeclampsia blood work, Patient feels like no one is concerned about how she is feeling. Patient states her legs and face are swollen. 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

## 2023-06-02 ENCOUNTER — ANESTHESIA EVENT (OUTPATIENT)
Dept: LABOR AND DELIVERY | Facility: HOSPITAL | Age: 38
End: 2023-06-02
Payer: COMMERCIAL

## 2023-06-02 ENCOUNTER — ANESTHESIA (OUTPATIENT)
Dept: LABOR AND DELIVERY | Facility: HOSPITAL | Age: 38
End: 2023-06-02
Payer: COMMERCIAL

## 2023-06-02 PROBLEM — O41.1230 CHORIOAMNIONITIS IN THIRD TRIMESTER: Status: ACTIVE | Noted: 2023-06-02

## 2023-06-02 PROBLEM — O13.3 GESTATIONAL HYPERTENSION, THIRD TRIMESTER: Status: ACTIVE | Noted: 2023-06-02

## 2023-06-02 PROCEDURE — 25010000002 ROPIVACAINE PER 1 MG: Performed by: NURSE ANESTHETIST, CERTIFIED REGISTERED

## 2023-06-02 PROCEDURE — S0260 H&P FOR SURGERY: HCPCS | Performed by: OBSTETRICS & GYNECOLOGY

## 2023-06-02 PROCEDURE — 25010000002 GENTAMICIN PER 80 MG: Performed by: OBSTETRICS & GYNECOLOGY

## 2023-06-02 PROCEDURE — 25010000002 FENTANYL CITRATE (PF) 50 MCG/ML SOLUTION: Performed by: OBSTETRICS & GYNECOLOGY

## 2023-06-02 PROCEDURE — 25010000002 AMPICILLIN PER 500 MG: Performed by: OBSTETRICS & GYNECOLOGY

## 2023-06-02 PROCEDURE — 0HQ9XZZ REPAIR PERINEUM SKIN, EXTERNAL APPROACH: ICD-10-PCS | Performed by: OBSTETRICS & GYNECOLOGY

## 2023-06-02 PROCEDURE — 25010000002 DIPHENHYDRAMINE PER 50 MG: Performed by: NURSE ANESTHETIST, CERTIFIED REGISTERED

## 2023-06-02 PROCEDURE — 59410 OBSTETRICAL CARE: CPT | Performed by: OBSTETRICS & GYNECOLOGY

## 2023-06-02 PROCEDURE — 25010000002 FENTANYL CITRATE (PF) 50 MCG/ML SOLUTION: Performed by: NURSE ANESTHETIST, CERTIFIED REGISTERED

## 2023-06-02 RX ORDER — PROMETHAZINE HYDROCHLORIDE 25 MG/1
25 TABLET ORAL ONCE AS NEEDED
Status: DISCONTINUED | OUTPATIENT
Start: 2023-06-02 | End: 2023-06-04 | Stop reason: HOSPADM

## 2023-06-02 RX ORDER — PROMETHAZINE HYDROCHLORIDE 12.5 MG/1
12.5 SUPPOSITORY RECTAL ONCE AS NEEDED
Status: DISCONTINUED | OUTPATIENT
Start: 2023-06-02 | End: 2023-06-04 | Stop reason: HOSPADM

## 2023-06-02 RX ORDER — TRISODIUM CITRATE DIHYDRATE AND CITRIC ACID MONOHYDRATE 500; 334 MG/5ML; MG/5ML
30 SOLUTION ORAL ONCE
Status: DISCONTINUED | OUTPATIENT
Start: 2023-06-02 | End: 2023-06-02 | Stop reason: HOSPADM

## 2023-06-02 RX ORDER — EPHEDRINE SULFATE 5 MG/ML
INJECTION INTRAVENOUS
Status: DISCONTINUED
Start: 2023-06-02 | End: 2023-06-04 | Stop reason: HOSPADM

## 2023-06-02 RX ORDER — HYDROCODONE BITARTRATE AND ACETAMINOPHEN 5; 325 MG/1; MG/1
1 TABLET ORAL EVERY 4 HOURS PRN
Status: DISCONTINUED | OUTPATIENT
Start: 2023-06-02 | End: 2023-06-04 | Stop reason: HOSPADM

## 2023-06-02 RX ORDER — BISACODYL 10 MG
10 SUPPOSITORY, RECTAL RECTAL DAILY PRN
Status: DISCONTINUED | OUTPATIENT
Start: 2023-06-03 | End: 2023-06-04 | Stop reason: HOSPADM

## 2023-06-02 RX ORDER — DIPHENHYDRAMINE HYDROCHLORIDE 50 MG/ML
12.5 INJECTION INTRAMUSCULAR; INTRAVENOUS EVERY 8 HOURS PRN
Status: DISCONTINUED | OUTPATIENT
Start: 2023-06-02 | End: 2023-06-02 | Stop reason: HOSPADM

## 2023-06-02 RX ORDER — DOCUSATE SODIUM 100 MG/1
100 CAPSULE, LIQUID FILLED ORAL 2 TIMES DAILY
Status: DISCONTINUED | OUTPATIENT
Start: 2023-06-02 | End: 2023-06-04 | Stop reason: HOSPADM

## 2023-06-02 RX ORDER — LIDOCAINE HYDROCHLORIDE AND EPINEPHRINE 15; 5 MG/ML; UG/ML
INJECTION, SOLUTION EPIDURAL AS NEEDED
Status: DISCONTINUED | OUTPATIENT
Start: 2023-06-02 | End: 2023-06-02 | Stop reason: SURG

## 2023-06-02 RX ORDER — ONDANSETRON 2 MG/ML
4 INJECTION INTRAMUSCULAR; INTRAVENOUS ONCE AS NEEDED
Status: DISCONTINUED | OUTPATIENT
Start: 2023-06-02 | End: 2023-06-02 | Stop reason: HOSPADM

## 2023-06-02 RX ORDER — METOCLOPRAMIDE 10 MG/1
10 TABLET ORAL ONCE AS NEEDED
Status: DISCONTINUED | OUTPATIENT
Start: 2023-06-02 | End: 2023-06-04 | Stop reason: HOSPADM

## 2023-06-02 RX ORDER — IBUPROFEN 600 MG/1
600 TABLET ORAL EVERY 6 HOURS PRN
Status: DISCONTINUED | OUTPATIENT
Start: 2023-06-02 | End: 2023-06-04 | Stop reason: HOSPADM

## 2023-06-02 RX ORDER — FENTANYL CITRATE 50 UG/ML
INJECTION, SOLUTION INTRAMUSCULAR; INTRAVENOUS AS NEEDED
Status: DISCONTINUED | OUTPATIENT
Start: 2023-06-02 | End: 2023-06-02 | Stop reason: SURG

## 2023-06-02 RX ORDER — ACETAMINOPHEN 325 MG/1
650 TABLET ORAL EVERY 6 HOURS PRN
Status: DISCONTINUED | OUTPATIENT
Start: 2023-06-02 | End: 2023-06-04 | Stop reason: HOSPADM

## 2023-06-02 RX ORDER — ROPIVACAINE HYDROCHLORIDE 2 MG/ML
15 INJECTION, SOLUTION EPIDURAL; INFILTRATION; PERINEURAL CONTINUOUS
Status: DISCONTINUED | OUTPATIENT
Start: 2023-06-02 | End: 2023-06-04 | Stop reason: HOSPADM

## 2023-06-02 RX ORDER — BUPIVACAINE HYDROCHLORIDE 7.5 MG/ML
INJECTION, SOLUTION INTRASPINAL AS NEEDED
Status: DISCONTINUED | OUTPATIENT
Start: 2023-06-02 | End: 2023-06-02 | Stop reason: SURG

## 2023-06-02 RX ORDER — HYDROCORTISONE 25 MG/G
1 CREAM TOPICAL AS NEEDED
Status: DISCONTINUED | OUTPATIENT
Start: 2023-06-02 | End: 2023-06-04 | Stop reason: HOSPADM

## 2023-06-02 RX ORDER — EPHEDRINE SULFATE 5 MG/ML
10 INJECTION INTRAVENOUS
Status: DISCONTINUED | OUTPATIENT
Start: 2023-06-02 | End: 2023-06-02 | Stop reason: HOSPADM

## 2023-06-02 RX ORDER — HYDROCODONE BITARTRATE AND ACETAMINOPHEN 10; 325 MG/1; MG/1
1 TABLET ORAL EVERY 4 HOURS PRN
Status: DISCONTINUED | OUTPATIENT
Start: 2023-06-02 | End: 2023-06-04 | Stop reason: HOSPADM

## 2023-06-02 RX ORDER — PRENATAL VIT/IRON FUM/FOLIC AC 27MG-0.8MG
1 TABLET ORAL DAILY
Status: DISCONTINUED | OUTPATIENT
Start: 2023-06-02 | End: 2023-06-04 | Stop reason: HOSPADM

## 2023-06-02 RX ORDER — SODIUM CHLORIDE 0.9 % (FLUSH) 0.9 %
1-10 SYRINGE (ML) INJECTION AS NEEDED
Status: DISCONTINUED | OUTPATIENT
Start: 2023-06-02 | End: 2023-06-04 | Stop reason: HOSPADM

## 2023-06-02 RX ORDER — LIDOCAINE HYDROCHLORIDE AND EPINEPHRINE 20; 5 MG/ML; UG/ML
INJECTION, SOLUTION EPIDURAL; INFILTRATION; INTRACAUDAL; PERINEURAL AS NEEDED
Status: DISCONTINUED | OUTPATIENT
Start: 2023-06-02 | End: 2023-06-02 | Stop reason: SURG

## 2023-06-02 RX ADMIN — SODIUM CHLORIDE, POTASSIUM CHLORIDE, SODIUM LACTATE AND CALCIUM CHLORIDE 125 ML/HR: 600; 310; 30; 20 INJECTION, SOLUTION INTRAVENOUS at 04:11

## 2023-06-02 RX ADMIN — DOCUSATE SODIUM 100 MG: 100 CAPSULE, LIQUID FILLED ORAL at 21:17

## 2023-06-02 RX ADMIN — ROPIVACAINE HYDROCHLORIDE 14 ML/HR: 2 INJECTION, SOLUTION EPIDURAL; INFILTRATION at 07:57

## 2023-06-02 RX ADMIN — FENTANYL CITRATE 100 MCG: 50 INJECTION, SOLUTION INTRAMUSCULAR; INTRAVENOUS at 07:55

## 2023-06-02 RX ADMIN — LIDOCAINE HYDROCHLORIDE,EPINEPHRINE BITARTRATE 6 ML: 20; .005 INJECTION, SOLUTION EPIDURAL; INFILTRATION; INTRACAUDAL; PERINEURAL at 13:49

## 2023-06-02 RX ADMIN — GENTAMICIN SULFATE 360 MG: 40 INJECTION, SOLUTION INTRAMUSCULAR; INTRAVENOUS at 15:54

## 2023-06-02 RX ADMIN — HYDROCODONE BITARTRATE AND ACETAMINOPHEN 1 TABLET: 10; 325 TABLET ORAL at 19:32

## 2023-06-02 RX ADMIN — DIPHENHYDRAMINE HYDROCHLORIDE 12.5 MG: 50 INJECTION INTRAMUSCULAR; INTRAVENOUS at 13:16

## 2023-06-02 RX ADMIN — AMPICILLIN SODIUM 1 G: 1 INJECTION, POWDER, FOR SOLUTION INTRAMUSCULAR; INTRAVENOUS at 15:21

## 2023-06-02 RX ADMIN — FENTANYL CITRATE 50 MCG: 50 INJECTION, SOLUTION INTRAMUSCULAR; INTRAVENOUS at 02:26

## 2023-06-02 RX ADMIN — FENTANYL CITRATE 50 MCG: 50 INJECTION, SOLUTION INTRAMUSCULAR; INTRAVENOUS at 01:20

## 2023-06-02 RX ADMIN — LIDOCAINE HYDROCHLORIDE AND EPINEPHRINE 3 ML: 15; 5 INJECTION, SOLUTION EPIDURAL at 07:52

## 2023-06-02 RX ADMIN — SODIUM CHLORIDE, POTASSIUM CHLORIDE, SODIUM LACTATE AND CALCIUM CHLORIDE 125 ML/HR: 600; 310; 30; 20 INJECTION, SOLUTION INTRAVENOUS at 11:44

## 2023-06-02 RX ADMIN — BUPIVACAINE HYDROCHLORIDE IN DEXTROSE 0.8 ML: 7.5 INJECTION, SOLUTION SUBARACHNOID at 07:49

## 2023-06-02 RX ADMIN — LIDOCAINE HYDROCHLORIDE,EPINEPHRINE BITARTRATE 6 ML: 20; .005 INJECTION, SOLUTION EPIDURAL; INFILTRATION; INTRACAUDAL; PERINEURAL at 11:44

## 2023-06-02 RX ADMIN — AMPICILLIN SODIUM 1 G: 1 INJECTION, POWDER, FOR SOLUTION INTRAMUSCULAR; INTRAVENOUS at 21:17

## 2023-06-02 RX ADMIN — FENTANYL CITRATE 50 MCG: 50 INJECTION, SOLUTION INTRAMUSCULAR; INTRAVENOUS at 05:00

## 2023-06-02 RX ADMIN — PRENATAL VITAMINS-IRON FUMARATE 27 MG IRON-FOLIC ACID 0.8 MG TABLET 1 TABLET: at 17:31

## 2023-06-02 RX ADMIN — FENTANYL CITRATE 50 MCG: 50 INJECTION, SOLUTION INTRAMUSCULAR; INTRAVENOUS at 06:00

## 2023-06-02 RX ADMIN — FENTANYL CITRATE 50 MCG: 50 INJECTION, SOLUTION INTRAMUSCULAR; INTRAVENOUS at 03:46

## 2023-06-02 RX ADMIN — ROPIVACAINE HYDROCHLORIDE 3 ML: 5 INJECTION, SOLUTION EPIDURAL; INFILTRATION; PERINEURAL at 07:55

## 2023-06-02 RX ADMIN — WITCH HAZEL 1 PAD: 500 SOLUTION RECTAL; TOPICAL at 17:31

## 2023-06-02 RX ADMIN — LIDOCAINE HYDROCHLORIDE AND EPINEPHRINE 2 ML: 15; 5 INJECTION, SOLUTION EPIDURAL at 07:54

## 2023-06-02 RX ADMIN — SODIUM CHLORIDE, POTASSIUM CHLORIDE, SODIUM LACTATE AND CALCIUM CHLORIDE 1000 ML: 600; 310; 30; 20 INJECTION, SOLUTION INTRAVENOUS at 07:55

## 2023-06-02 RX ADMIN — Medication: at 17:31

## 2023-06-02 RX ADMIN — HYDROCODONE BITARTRATE AND ACETAMINOPHEN 1 TABLET: 5; 325 TABLET ORAL at 15:25

## 2023-06-02 NOTE — ANESTHESIA PREPROCEDURE EVALUATION
Anesthesia Evaluation     Patient summary reviewed and Nursing notes reviewed   NPO Solid Status: > 6 hours  NPO Liquid Status: > 6 hours           Airway   Dental      Pulmonary - negative pulmonary ROS   Cardiovascular     (+) hypertension,       Neuro/Psych  (+) psychiatric history Anxiety,    GI/Hepatic/Renal/Endo    (+) obesity,   diabetes mellitus gestational well controlled,     Musculoskeletal (-) negative ROS    Abdominal    Substance History - negative use     OB/GYN    (+) Pregnant, pregnancy induced hypertension        Other                        Anesthesia Plan    ASA 3     epidural       Anesthetic plan, risks, benefits, and alternatives have been provided, discussed and informed consent has been obtained with: patient.        CODE STATUS:    Level Of Support Discussed With: Patient  Code Status (Patient has no pulse and is not breathing): CPR (Attempt to Resuscitate)  Medical Interventions (Patient has pulse or is breathing): Full Support

## 2023-06-02 NOTE — H&P
[facility]  Obstetric History and Physical    Referring Provider: Karlos Dias MD      Chief Complaint   Patient presents with    Scheduled Induction       Subjective     Patient is a 38 y.o. female  currently at 38w4d, who presented overnight for IOL due to recently diagnosed gestational hypertension. She has reported elevated BP at home and recently in triage found to have diastolic BP in the 90's consistent with gestational hypertension    Her prenatal care is complicated by  hypertension  gestational hypertension and diabetes  GDM A1.  Her previous obstetric/gynecological history is noted for is remarkable for preeclampsia and 3 prior vaginal deliveries.    The following portions of the patients history were reviewed and updated as appropriate: current medications, allergies, past medical history, past surgical history, past family history, past social history, and problem list .       Prenatal Information:   Maternal Prenatal Labs  Blood Type ABO Type   Date Value Ref Range Status   2023 O  Final      Rh Status RH type   Date Value Ref Range Status   2023 Positive  Final      Antibody Screen Antibody Screen   Date Value Ref Range Status   2023 Negative  Final      Gonnorhea No results found for: GCCX   Chlamydia No results found for: CLAMYDCU   RPR No results found for: RPR   Syphilis Antibody No results found for: SYPHILIS   Rubella No results found for: RUBELLAIGGIN   Hepatitis B Surface Antigen No results found for: HEPBSAG   HIV-1 Antibody No results found for: LABHIV1   Hepatitis C Antibody No results found for: HEPCAB   Rapid Urin Drug Screen No results found for: AMPMETHU, BARBITSCNUR, LABBENZSCN, LABMETHSCN, LABOPIASCN, THCURSCR, COCAINEUR, AMPHETSCREEN, PROPOXSCN, BUPRENORSCNU, METAMPSCNUR, OXYCODONESCN, TRICYCLICSCN   Group B Strep Culture No results found for: GBSANTIGEN           External Prenatal Results       Pregnancy Outside Results - Transcribed From Office Records -  See Scanned Records For Details       Test Value Date Time    ABO  O  23 1648    Rh  Positive  23 1648    Antibody Screen  Negative  23 1648    Varicella IgG       Rubella       Hgb  9.5 g/dL 23 1648       9.3 g/dL 23 1909       10.5 g/dL 05/10/23 1147      ^ 10.7 g/dL 23 1442      ^ 11.2 g/dL 23 0848      ^ 12.2 g/dL 22 1713    Hct  28.0 % 23 1648       27.7 % 23 1909       30.7 % 05/10/23 1147      ^ 31.8 % 23 1442      ^ 34.0 % 23 0848      ^ 36.0 % 22 1713    Glucose Fasting GTT       Glucose Tolerance Test 1 hour       Glucose Tolerance Test 3 hour       Gonorrhea (discrete)  Negative  12/01/15 1034    Chlamydia (discrete)       RPR       VDRL       Syphilis Antibody       HBsAg ^ Negative  22 1713    Herpes Simplex Virus PCR       Herpes Simplex VIrus Culture       HIV ^ Nonreactive  22 1713    Hep C RNA Quant PCR       Hep C Antibody       AFP       Group B Strep  No Group B Streptococcus isolated  23 1816    GBS Susceptibility to Clindamycin       GBS Susceptibility to Erythromycin       Fetal Fibronectin       Genetic Testing, Maternal Blood                 Drug Screening       Test Value Date Time    Urine Drug Screen       Amphetamine Screen  Negative ng/mL 23 1100    Barbiturate Screen  Negative ng/mL 23 1100    Benzodiazepine Screen  Negative ng/mL 23 1100    Methadone Screen  Negative ng/mL 23 1100    Phencyclidine Screen  Negative ng/mL 23 1100    Opiates Screen       THC Screen       Cocaine Screen       Propoxyphene Screen  Negative ng/mL 23 1100    Buprenorphine Screen  Negative ng/mL 12/01/15 1034    Methamphetamine Screen       Oxycodone Screen  Negative ng/mL 12/01/15 1034    Tricyclic Antidepressants Screen                 Legend    ^: Historical                              Past OB History:       OB History    Para Term  AB Living   5 3 3 0 1 3   SAB  IAB Ectopic Molar Multiple Live Births   1 0 0 0 0 3      # Outcome Date GA Lbr Noah/2nd Weight Sex Delivery Anes PTL Lv   5 Current            4 Term 06/14/16 37w4d  3260 g (7 lb 3 oz) M Vag-Spont EPI  JONN      Complications: Pregnancy-induced hypertension   3 Term 04/04/15 38w2d  3459 g (7 lb 10 oz) F Vag-Spont EPI  JONN      Complications: Pregnancy-induced hypertension   2 SAB 2013           1 Term 07/08/10 39w0d  2977 g (6 lb 9 oz) M Vag-Spont EPI  JONN      Complications: Pregnancy-induced hypertension       Past Medical History: Past Medical History:   Diagnosis Date    Anemia 09/01/2022    Anxiety     Chronic hypertension     Gestational diabetes     Gestational hypertension     History of pre-eclampsia 02/01/2015    History of recurrent UTIs     Pulmonary edema 02/2015      Past Surgical History Past Surgical History:   Procedure Laterality Date    NO PAST SURGERIES        Family History: Family History   Problem Relation Age of Onset    Diabetes type II Father     Hypertension Father     Hypertension Mother     Other Son         bifid uvula and polydactyly    Breast cancer Neg Hx     Colon cancer Neg Hx     Ovarian cancer Neg Hx       Social History:  reports that she has never smoked. She does not have any smokeless tobacco history on file.   reports that she does not currently use alcohol after a past usage of about 2.0 standard drinks per week.   reports no history of drug use.                   General ROS Negative Findings:Headaches, Visual Changes, Epigastric pain, Anorexia, Nausia/Vomiting, ROM, and Vaginal Bleeding    ROS     All other systems have been reviewed and are neg  Objective       Vital Signs Range for the last 24 hours  Temperature: Temp:  [98.2 °F (36.8 °C)-98.5 °F (36.9 °C)] 98.5 °F (36.9 °C)   Temp Source: Temp src: Oral   BP: BP: (121-132)/(70-79) 128/70   Pulse: Heart Rate:  [76-96] 83   Respirations: Resp:  [16-18] 16   SPO2:     O2 Amount (l/min):     O2 Devices Device (Oxygen  Therapy): room air   Weight: Weight:  [97.5 kg (215 lb)] 97.5 kg (215 lb)     Physical Examination:   General:   alert, appears stated age, and cooperative   Skin:   normal   HEENT:     Lungs:   clear to auscultation bilaterally   Heart:   regular rate and rhythm, S1, S2 normal, no murmur, click, rub or gallop   Abdomen:  soft, non-tender; bowel sounds normal; no masses,  no organomegaly   Lower Extremities    Pelvis:  Vulva and vagina appear normal. Bimanual exam reveals normal uterus and adnexa.         Presentation: vertex   Cervix: Exam by: Method: sterile exam per RN   Dilation:     Effacement: Cervical Effacement: 80%   Station:         Fetal Heart Rate Assessment   Method: Fetal HR Assessment Method: external   Beats/min: Fetal HR (beats/min): 135   Baseline: Fetal HR Baseline: normal range   Varibility: Fetal HR Variability: moderate (amplitude range 6 to 25 bpm)   Accels: Fetal HR Accelerations: greater than/equal to 15 bpm, lasting at least 15 seconds   Decels: Fetal HR Decelerations: absent   Tracing Category:       Uterine Assessment   Method: Method: external tocotransducer   Frequency (min): Contraction Frequency (Minutes): Poor Tracing   Ctx Count in 10 min:     Duration:     Intensity: Contraction Intensity: moderate by palpation   Intensity by IUPC:     Resting Tone: Uterine Resting Tone: soft by palpation   Resting Tone by IUPC:     [place] Units:       Laboratory Results:   Lab Results (last 24 hours)       Procedure Component Value Units Date/Time    POC Glucose Once [[id number]]  (Normal) Collected: 06/01/23 2319    Specimen: Blood Updated: 06/01/23 2321     Glucose 112 mg/dL      Comment: Meter: [id number] : [id number] Dannie Kennedy       Preeclampsia Panel [[id number]]  (Abnormal) Collected: 06/01/23 1648    Specimen: Blood Updated: 06/01/23 1730     Alkaline Phosphatase 128 U/L      ALT (SGPT) 34 U/L      AST (SGOT) 28 U/L      Creatinine 0.53 mg/dL      Total Bilirubin 0.7 mg/dL        U/L      Comment: Specimen hemolyzed.  Results may be affected.        Uric Acid 5.7 mg/dL     CBC (No Diff) [[id number]]  (Abnormal) Collected: 06/01/23 1648    Specimen: Blood Updated: 06/01/23 1658     WBC 8.27 10*3/mm3      RBC 2.92 10*6/mm3      Hemoglobin 9.5 g/dL      Hematocrit 28.0 %      MCV 95.9 fL      MCH 32.5 pg      MCHC 33.9 g/dL      RDW 13.1 %      RDW-SD 45.1 fl      MPV 11.5 fL      Platelets 175 10*3/mm3           Radiology Review:   Imaging Results (Last 24 Hours)       ** No results found for the last 24 hours. **          Other Studies:    Assessment & Plan       Pregnancy        Assessment:  1.  Intrauterine pregnancy at 38w4d weeks gestation with reactive fetal status.    2.  Gestational hypertension  3. Transfer of care in 3rd trimester  4. A1GDM    Plan:  1. Vaginal anticipated  2. Plan of care has been reviewed with patient.  3.  Risks, benefits of treatment plan have been discussed.  4.  All questions have been answered.  5. Will continue to monitor BP for si/sx of preeclampsia or severe disease.       Karlos Dias MD  6/2/2023  07:35 EDT

## 2023-06-02 NOTE — NURSING NOTE
"Pt desires to get up to BR. RN assessed mobility and strength of bilateral legs after epidural and vaginal delivery. Pt able to perform ROM with left leg, unable to lift right leg off bed. RN explained why pt cannot get out of bed and offered bed pan and/or straight cath, pt refused. Pt states \"RN won't let me use the bathroom\" to FOB.   "

## 2023-06-02 NOTE — L&D DELIVERY NOTE
[facility]  Vaginal Delivery Note   Review the Delivery Report for details.       Delivery     Delivery: Vaginal, Spontaneous     YOB: 2022    Time of Birth:  Gestational Age 2:03 PM   38w4d     Anesthesia: Epidural     Delivering clinician: Karlos Dias    Forceps?   No   Vacuum? No    Shoulder dystocia present: No        Delivery narrative:  Called to the patient's room to evaluate due to inability to get comfortable despite epidural. Now with fetal tachycardia and reported normal maternal temperature. Patient found to be completely dilated with head at +2 station. Hdz catheter removed and patient delivered a viable male infant vaginally from the MEG presentation with 2 series of pushes. Suction performed at delivery and vigorously crying infant placed on maternal chest. Delayed cord clamping performed. Pitocin given IV and placenta delivered with gentle downward traction. Uterus felt warm and repeat temp 101.5.  A 1st degree vaginal laceration repaired with 3.0 vicryl. Good hemostasis noted.     Infant    Findings: male  infant     Infant observations: Weight: 3790 g (8 lb 5.7 oz)   Length: 21  in  Observations/Comments:        Apgars: 8  @ 1 minute /    9  @ 5 minutes   Infant Name:      Placenta, Cord, and Fluid    Placenta delivered  Spontaneous  at   6/2/2023  2:06 PM     Cord: 3 vessels  present.   Nuchal Cord?  no   Cord blood obtained: Yes    Cord gases obtained:  No    Cord gas results: Venous:  No results found for: PHCVEN    Arterial:  No results found for: PHCART     Repair    Episiotomy: Not recorded     No    Lacerations: Yes  Laceration Information  Laceration Repaired?   Perineal:       Periurethral:       Labial:       Sulcus:       Vaginal:       Cervical:         Suture used for repair: 3-0 Vicryl   Estimated Blood Loss:  200ml             Quantitative Blood Loss:                  Complications  amnionitis    Disposition  Mother to Mother Baby/Postpartum  in stable  condition currently.  Baby to NBN  in stable condition currently.      Karlos Dias MD  06/02/23  14:29 EDT

## 2023-06-02 NOTE — NURSING NOTE
"Pt writhing around in bed, stating \"I can't do this. This stupid baby isn't coming down and I'm hurting. He's never coming out and nobody will fucking pull him out.\" Assured pt that she was in good hands and 10cm; anesthesia on their way to give redose; and will call Dr. Dias to attend delivery. Pt stated \"nobody is moving fast enough, you fucking nurses I swear\". Calmly requested patient to not to speak to me in that tone. Pt stated \"I'm not speaking in any kind of tone, you bitch. You've got something smart to say any time I say something out of the way. I want another nurse\". Charge RN notified, MD notified, all care transferred to new nurse.   "

## 2023-06-02 NOTE — ANESTHESIA PROCEDURE NOTES
CSE Block      Patient reassessed immediately prior to procedure    Preanesthetic Checklist  Completed: patient identified, IV checked, site marked, risks and benefits discussed, surgical consent, monitors and equipment checked, pre-op evaluation and timeout performed  CSE  Patient position: sitting  Prep: DuraPrep  Patient monitoring: blood pressure monitoring  Procedures: palpation technique  Spinal Needle  Needle type: Fatoumata   Needle gauge: 25 G  Approach: midline  Location: L3-4  Fluid Appearance: clear    Epidural Needle  Injection technique: CECILLE saline  Needle type: Tuohy   Needle gauge: 18 G  Location: L3-L4  Loss of Resistance: 7cm  Cath Depth at Skin (cm): 12  Aspiration: negative  Test dose: negative      Catheter  Catheter type: side hole  Catheter size: 20 G  Assessment  Dressing:occlusive dressing applied and secured with tape  Pt Tolerance:patient tolerated the procedure well with no apparent complications  Complications:no

## 2023-06-02 NOTE — NURSING NOTE
"Upon arrival to patient room to get patient cleaned up for transfer to MB, pt out of bed and in BR with FOB. RN reiterated that patient was not to get out of bed with limited mobility in RLE. Pt states \"I just need a new nurse who's actually going to take care of me.\" RN asked what happened to epidural, as patient was still connected, FOB states he disconnected epidural. RN witnessed epidural catheter with tip intact. Per pt request, new RN taking over patient care.   "

## 2023-06-02 NOTE — LACTATION NOTE
06/02/23 1800   Maternal Information   Date of Referral 06/02/23   Person Making Referral lactation consultant  (courtesy consult)   Maternal Reason for Referral   ( other three children and last baby for 2 years)   Infant Reason for Referral   (reports baby is breastfeeding well)   Milk Expression/Equipment   Breast Pump Type double electric, personal  (mom has an Mable STride at home)     Discussed milk supply, breast care, attempting to feed every 3 hours, skin to skin, void and stool diapers, weight loss, position and latch. To call lactation services, if there are questions or concerns or if mom wants help with a breastfeeding.

## 2023-06-03 LAB
BASOPHILS # BLD AUTO: 0.02 10*3/MM3 (ref 0–0.2)
BASOPHILS NFR BLD AUTO: 0.2 % (ref 0–1.5)
DEPRECATED RDW RBC AUTO: 45.1 FL (ref 37–54)
EOSINOPHIL # BLD AUTO: 0.01 10*3/MM3 (ref 0–0.4)
EOSINOPHIL NFR BLD AUTO: 0.1 % (ref 0.3–6.2)
ERYTHROCYTE [DISTWIDTH] IN BLOOD BY AUTOMATED COUNT: 13 % (ref 12.3–15.4)
HCT VFR BLD AUTO: 23.7 % (ref 34–46.6)
HGB BLD-MCNC: 8 G/DL (ref 12–15.9)
IMM GRANULOCYTES # BLD AUTO: 0.17 10*3/MM3 (ref 0–0.05)
IMM GRANULOCYTES NFR BLD AUTO: 1.3 % (ref 0–0.5)
LYMPHOCYTES # BLD AUTO: 1.84 10*3/MM3 (ref 0.7–3.1)
LYMPHOCYTES NFR BLD AUTO: 13.9 % (ref 19.6–45.3)
MCH RBC QN AUTO: 32.8 PG (ref 26.6–33)
MCHC RBC AUTO-ENTMCNC: 33.8 G/DL (ref 31.5–35.7)
MCV RBC AUTO: 97.1 FL (ref 79–97)
MONOCYTES # BLD AUTO: 0.7 10*3/MM3 (ref 0.1–0.9)
MONOCYTES NFR BLD AUTO: 5.3 % (ref 5–12)
NEUTROPHILS NFR BLD AUTO: 10.51 10*3/MM3 (ref 1.7–7)
NEUTROPHILS NFR BLD AUTO: 79.2 % (ref 42.7–76)
NRBC BLD AUTO-RTO: 0.2 /100 WBC (ref 0–0.2)
PLATELET # BLD AUTO: 167 10*3/MM3 (ref 140–450)
PMV BLD AUTO: 11.2 FL (ref 6–12)
RBC # BLD AUTO: 2.44 10*6/MM3 (ref 3.77–5.28)
WBC NRBC COR # BLD: 13.25 10*3/MM3 (ref 3.4–10.8)

## 2023-06-03 PROCEDURE — 85025 COMPLETE CBC W/AUTO DIFF WBC: CPT | Performed by: OBSTETRICS & GYNECOLOGY

## 2023-06-03 PROCEDURE — 0503F POSTPARTUM CARE VISIT: CPT | Performed by: NURSE PRACTITIONER

## 2023-06-03 PROCEDURE — 25010000002 AMPICILLIN PER 500 MG: Performed by: OBSTETRICS & GYNECOLOGY

## 2023-06-03 RX ORDER — FERROUS SULFATE 325(65) MG
325 TABLET ORAL 2 TIMES DAILY WITH MEALS
Status: DISCONTINUED | OUTPATIENT
Start: 2023-06-03 | End: 2023-06-04 | Stop reason: HOSPADM

## 2023-06-03 RX ADMIN — AMPICILLIN SODIUM 1 G: 1 INJECTION, POWDER, FOR SOLUTION INTRAMUSCULAR; INTRAVENOUS at 03:50

## 2023-06-03 RX ADMIN — DOCUSATE SODIUM 100 MG: 100 CAPSULE, LIQUID FILLED ORAL at 20:12

## 2023-06-03 RX ADMIN — Medication 10 ML: at 17:38

## 2023-06-03 RX ADMIN — FERROUS SULFATE TAB 325 MG (65 MG ELEMENTAL FE) 325 MG: 325 (65 FE) TAB at 10:49

## 2023-06-03 RX ADMIN — PRENATAL VITAMINS-IRON FUMARATE 27 MG IRON-FOLIC ACID 0.8 MG TABLET 1 TABLET: at 09:46

## 2023-06-03 RX ADMIN — IBUPROFEN 600 MG: 600 TABLET ORAL at 09:46

## 2023-06-03 RX ADMIN — AMPICILLIN SODIUM 1 G: 1 INJECTION, POWDER, FOR SOLUTION INTRAMUSCULAR; INTRAVENOUS at 09:49

## 2023-06-03 RX ADMIN — HYDROCODONE BITARTRATE AND ACETAMINOPHEN 1 TABLET: 5; 325 TABLET ORAL at 21:09

## 2023-06-03 RX ADMIN — DOCUSATE SODIUM 100 MG: 100 CAPSULE, LIQUID FILLED ORAL at 09:46

## 2023-06-03 RX ADMIN — HYDROCODONE BITARTRATE AND ACETAMINOPHEN 1 TABLET: 5; 325 TABLET ORAL at 15:52

## 2023-06-03 RX ADMIN — FERROUS SULFATE TAB 325 MG (65 MG ELEMENTAL FE) 325 MG: 325 (65 FE) TAB at 17:37

## 2023-06-03 NOTE — PLAN OF CARE
Goal Outcome Evaluation:           Progress: improving  Outcome Evaluation: VSS. Pain controlled with PRN Norco and Ibuprofen. Pt ambulates in room without symptoms. Lochia, fundus, laceration WDL. Pt breastfeeding on demand.

## 2023-06-03 NOTE — NURSING NOTE
"Upon arrival to patients room to notify her that baby would need to be weighed and have his 12 hour blood sugar done, patient stated \"if you fucking bitches don't stop coming into my room, I just had a child and am finally trying to sleep, it is 1:00AM\". This RN told pt that yes unfortunately you do not receive a lot of rest in the hospital and apologized. Pt stated \"if another nurse comes into my room in the next hour I am taking my baby and I am leaving AMA and you will never see me again\". RN stated unfortunately you do have antibiotics at 3:00AM so I will have to come in to give those. Pt rolled her eyes and stated \"my fucking god\". RN left room.   "

## 2023-06-03 NOTE — ANESTHESIA POSTPROCEDURE EVALUATION
Patient: Karen Garnica    Procedure Summary       Date: 06/02/23 Room / Location:     Anesthesia Start: 0733 Anesthesia Stop: 1406    Procedure: LABOR ANALGESIA Diagnosis:     Scheduled Providers:  Provider: Lexis Jeff DO    Anesthesia Type: epidural ASA Status: 3            Anesthesia Type: epidural    Vitals  Vitals Value Taken Time   /69 06/03/23 0707   Temp 99 °F (37.2 °C) 06/03/23 0707   Pulse 93 06/03/23 0707   Resp 18 06/03/23 0707   SpO2 97 % 06/02/23 1307   Vitals shown include unvalidated device data.        Post Anesthesia Care and Evaluation    Patient location during evaluation: bedside  Patient participation: complete - patient participated  Level of consciousness: awake  Pain score: 0  Pain management: satisfactory to patient    Airway patency: patent  Anesthetic complications: No anesthetic complications  PONV Status: none  Cardiovascular status: acceptable and hemodynamically stable  Respiratory status: acceptable  Hydration status: acceptable  Post Neuraxial Block status: Motor and sensory function returned to baseline and No signs or symptoms of PDPH

## 2023-06-03 NOTE — PROGRESS NOTES
[facility]  Karen Garnica  : 1985  MRN: [id number]  CSN: [id number]    Postpartum Day #1  Subjective   Her pain is well controlled. Vaginal bleeding is normal in amount. She is ambulating without difficulty. She is voiding without difficulty. Her baby is doing well..She has been afebrile since her temp of 101.5 at 1407 on 23.     Objective     Min/max vitals past 24 hours:   Temp  Min: 97.7 °F (36.5 °C)  Max: 101.5 °F (38.6 °C)  BP  Min: 111/58  Max: 145/73  Pulse  Min: 81  Max: 136  Resp  Min: 17  Max: 20        General: well developed; well nourished  no acute distress   Abdomen: soft, non-tender; no masses  no umbilical or inguinal hernias are present  no hepato-splenomegaly   Pelvic: Not performed   Ext: Calves NT     Lab Results   Component Value Date    WBC 13.25 (H) 2023    HGB 8.0 (L) 2023    HCT 23.7 (L) 2023    MCV 97.1 (H) 2023     2023    ABORH O Rh Positive 2015    RH Positive 2023    HEPBSAG Negative 2022        Assessment   Postpartum Day #1 S/P vaginal delivery  Postpartum anemia     Plan   Continue routine postpartum care  Postpartum anemia - start po iron  Discontinue iv abx at 24 hours post fever (1407) if no further temperature elevation    Sweetie Julian CNM  6/3/2023  10:39 EDT

## 2023-06-03 NOTE — NURSING NOTE
RN attempted to give pt. her antibiotics at 0210 and take baby for his 12 hour blood sugar, pt. refused.

## 2023-06-04 VITALS
RESPIRATION RATE: 16 BRPM | TEMPERATURE: 98.4 F | BODY MASS INDEX: 36.7 KG/M2 | HEART RATE: 85 BPM | SYSTOLIC BLOOD PRESSURE: 121 MMHG | HEIGHT: 64 IN | DIASTOLIC BLOOD PRESSURE: 74 MMHG | WEIGHT: 215 LBS

## 2023-06-04 PROBLEM — R07.81 RIB PAIN: Status: RESOLVED | Noted: 2023-05-30 | Resolved: 2023-06-04

## 2023-06-04 PROBLEM — O09.299 HX OF PREECLAMPSIA, PRIOR PREGNANCY, CURRENTLY PREGNANT: Status: RESOLVED | Noted: 2022-12-06 | Resolved: 2023-06-04

## 2023-06-04 PROBLEM — O24.410 DIET CONTROLLED GESTATIONAL DIABETES MELLITUS (GDM) IN THIRD TRIMESTER: Status: RESOLVED | Noted: 2023-05-10 | Resolved: 2023-06-04

## 2023-06-04 PROBLEM — O13.3 GESTATIONAL HYPERTENSION, THIRD TRIMESTER: Status: RESOLVED | Noted: 2023-06-02 | Resolved: 2023-06-04

## 2023-06-04 PROBLEM — O09.43 SUPERVISION OF HIGH-RISK PREGNANCY WITH GRAND MULTIPARITY IN THIRD TRIMESTER: Status: RESOLVED | Noted: 2023-05-10 | Resolved: 2023-06-04

## 2023-06-04 PROBLEM — O13.3 GESTATIONAL HYPERTENSION, THIRD TRIMESTER: Status: RESOLVED | Noted: 2023-05-10 | Resolved: 2023-06-04

## 2023-06-04 PROBLEM — O13.3 GESTATIONAL HYPERTENSION, THIRD TRIMESTER: Status: RESOLVED | Noted: 2023-04-19 | Resolved: 2023-06-04

## 2023-06-04 PROBLEM — O41.1230 CHORIOAMNIONITIS IN THIRD TRIMESTER: Status: RESOLVED | Noted: 2023-06-02 | Resolved: 2023-06-04

## 2023-06-04 PROBLEM — Z34.90 PREGNANCY: Status: RESOLVED | Noted: 2023-06-01 | Resolved: 2023-06-04

## 2023-06-04 PROCEDURE — 0503F POSTPARTUM CARE VISIT: CPT | Performed by: NURSE PRACTITIONER

## 2023-06-04 RX ORDER — PNV NO.95/FERROUS FUM/FOLIC AC 28MG-0.8MG
1 TABLET ORAL DAILY
Qty: 30 TABLET | Refills: 11 | Status: SHIPPED | OUTPATIENT
Start: 2023-06-04

## 2023-06-04 RX ORDER — DOCUSATE SODIUM 100 MG/1
100 CAPSULE, LIQUID FILLED ORAL 2 TIMES DAILY
Qty: 60 CAPSULE | Refills: 1 | Status: SHIPPED | OUTPATIENT
Start: 2023-06-04

## 2023-06-04 RX ORDER — FERROUS SULFATE 325(65) MG
325 TABLET ORAL
Qty: 60 TABLET | Refills: 1 | Status: SHIPPED | OUTPATIENT
Start: 2023-06-04

## 2023-06-04 RX ORDER — IBUPROFEN 600 MG/1
600 TABLET ORAL EVERY 6 HOURS PRN
Qty: 60 TABLET | Refills: 1 | Status: SHIPPED | OUTPATIENT
Start: 2023-06-04

## 2023-06-04 RX ADMIN — HYDROCODONE BITARTRATE AND ACETAMINOPHEN 1 TABLET: 5; 325 TABLET ORAL at 05:51

## 2023-06-04 RX ADMIN — PRENATAL VITAMINS-IRON FUMARATE 27 MG IRON-FOLIC ACID 0.8 MG TABLET 1 TABLET: at 08:05

## 2023-06-04 RX ADMIN — HYDROCODONE BITARTRATE AND ACETAMINOPHEN 1 TABLET: 5; 325 TABLET ORAL at 01:29

## 2023-06-04 RX ADMIN — FERROUS SULFATE TAB 325 MG (65 MG ELEMENTAL FE) 325 MG: 325 (65 FE) TAB at 08:05

## 2023-06-04 RX ADMIN — DOCUSATE SODIUM 100 MG: 100 CAPSULE, LIQUID FILLED ORAL at 08:05

## 2023-06-04 NOTE — NURSING NOTE
"RN heard yelling room from nurses station. Pt yelling \"get out, get out now\". Security and Charge RN notified. After charge RN arrived outside of pt room RN and Charge RN could hear pt state \"you're not going to put your hands on me\". Pt requesting FOB to leave stating \"He thinks I shouldn't talk when nursing the baby because he thinks it causes gas\", \"He doesn't think I can do anything right\". FOB left unit with security, pt stated he could return later in the night after \"cooling off\". Pt denies FOB placing his hands on her during argument.   "

## 2023-06-04 NOTE — DISCHARGE SUMMARY
Discharge Summary    [facility]   Karen Garnica  : 1985  MRN: [id number]  CSN: [id number]    Date of Admission: 2023   Date of Discharge:  2023   Delivering Physician: Karlos Dias        Admission Diagnosis: Pregnancy [Z34.90]   Discharge Diagnosis: Same as above plus  Pregnancy at 38w4d - delivered  ghtn       Procedures: 2023  - Vaginal, Spontaneous       Hospital Course  Patient is a 38 y.o.  who at 38w4d had a vaginal birth.  Her postpartum course was without complications.  On PPD # 2 she was ready for discharge.  She had normal lochia and pain well controlled with oral medications.    Infant  male  fetus weighing 3790 g (8 lb 5.7 oz)   Apgars -  8 @ 1 minute /  9 @ 5 minutes.    Discharge labs  Lab Results   Component Value Date    WBC 13.25 (H) 2023    HGB 8.0 (L) 2023    HCT 23.7 (L) 2023     2023       Discharge Medications     Discharge Medications        New Medications        Instructions Start Date   docusate sodium 100 MG capsule   100 mg, Oral, 2 Times Daily      ibuprofen 600 MG tablet  Commonly known as: ADVIL,MOTRIN   600 mg, Oral, Every 6 Hours PRN             Changes to Medications        Instructions Start Date   ferrous sulfate 325 (65 FE) MG tablet  What changed:   medication strength  how much to take  when to take this   325 mg, Oral, Daily With Breakfast      Prenatal Vitamin 27-0.8 MG tablet  What changed: medication strength   1 tablet, Oral, Daily             Continue These Medications        Instructions Start Date   fish oil-omega-3 fatty acids 1000 MG capsule   Oral      OneTouch Delica Plus [id number] misc   No dose, route, or frequency recorded.      OneTouch Verio Flex System w/Device kit   No dose, route, or frequency recorded.             Stop These Medications      nitrofurantoin (macrocrystal-monohydrate) 100 MG capsule  Commonly known as: MACROBID     OneTouch Verio test strip  Generic drug: glucose  blood              Discharge Disposition Home or Self Care   Condition on Discharge: good   Follow-up: 1 week with  Dr Dias for blood pressure check  Postpartum depression, mastitis, lochia and pre eclampsia warning signs reviewed      Sweetie Julian CNM  6/4/2023

## 2023-06-04 NOTE — THERAPY PROGRESS REPORT/RE-CERT
"[facility]  Vaginal Delivery Progress Note    Subjective   Postpartum Day 2: Vaginal Delivery    The patient feels well.  Her pain is well controlled with nonsteroidal anti-inflammatory drugs.   She is ambulating well.  Patient describes her bleeding as thin lochia.    Breastfeeding: infant latching.    Objective     Vital Signs Range for the last 24 hours  Temperature: Temp:  [98.4 °F (36.9 °C)-98.8 °F (37.1 °C)] 98.4 °F (36.9 °C)   Temp Source: Temp src: Oral   BP: BP: (121-135)/(72-90) 121/74   Pulse: Heart Rate:  [71-85] 85   Respirations: Resp:  [16-18] 16   SPO2:     O2 Amount (l/min):     O2 Devices Device (Oxygen Therapy): room air   Weight:       Admit Height:  Height: 162.6 cm (64\")      Physical Exam:  General:  no acute distress.  Abdomen: abdomen is soft without significant tenderness, masses, organomegaly or guarding. Fundus: appropriate, firm, non tender  Extremities: normal, atraumatic, no cyanosis, and trace edema.       Lab results reviewed:  Yes   Rubella:  No results found for: RUBELLAIGGIN Nurse Transcribed from prenatal record --  No results found for: RUBELLAABIGG  Rh Status:    RH type   Date Value Ref Range Status   06/01/2023 Positive  Final     Immunizations:   Immunization History   Administered Date(s) Administered    COVID-19 (PFIZER) Purple Cap Monovalent 01/13/2021, 02/23/2021, 09/30/2021       Assessment & Plan       Gestational hypertension, third trimester    Pregnancy    Chorioamnionitis in third trimester      Karen Garnica is Day 2  post-partum    Vaginal, Spontaneous     .      Plan:  Discharge home with standard precautions and return to clinic in 1 week for blood pressure check      Sweetie Julian CNM  6/4/2023  10:09 EDT    "

## 2023-06-05 NOTE — PAYOR COMM NOTE
"Karen Luna (38 y.o. Female) member ID for [facility] [id number]      Date of Birth   1985    Social Security Number       Address   [address] [place] [address]    Home Phone   [phone]    MRN   [id number]       Jew   None    Marital Status                               Admission Date   23    Admission Type   Elective    Admitting Provider   Karlos Dias MD    Attending Provider       Department, Room/Bed   [facility] MOTHER BABY 4B, N438/1       Discharge Date   2023    Discharge Disposition   Home or Self Care    Discharge Destination                                 Attending Provider: (none)   Allergies: No Known Allergies    Isolation: None   Infection: None   Code Status: Prior    Ht: 162.6 cm (64\")   Wt: 97.5 kg (215 lb)    Admission Cmt: None   Principal Problem: Gestational hypertension, third trimester [O13.3]                   Active Insurance as of 2023       Primary Coverage       Payor Plan Insurance Group Employer/Plan Group    [employer]HEM BLUE CROSS ANTHEM BLUE CROSS BLUE SHIELD PPO [id number]       Payor Plan Address Payor Plan Phone Number Payor Plan Fax Number Effective Dates    [address] [phone]  2021 - None Entered    [facility] [id number]         Subscriber Name Subscriber Birth Date Member ID       KAREN LUNA 1985 [id number]               Secondary Coverage       Payor Plan Insurance Group Employer/Plan Group    [place] [facility] [place] [facility] [place]        Payor Plan Address Payor Plan Phone Number Payor Plan Fax Number Effective Dates    [address]   10/1/2019 - None Entered    [facility] [phone]         Subscriber Name Subscriber Birth Date Member ID       KAREN LUNA 1985 [id number]                     Emergency Contacts        (Rel.) Home Phone Work Phone Mobile Phone    Dahlia LUNA (Spouse) -- -- [phone]                 Discharge " Summary        Sweetie Julian CNM at 23 1013       Attestation signed by Irma Del Valle MD at 23 1129    I have reviewed this documentation and agree.                  Discharge Summary    TERRI Garnica  : 1985  MRN: [id number]  CSN: [id number]    Date of Admission: 2023   Date of Discharge:  2023   Delivering Physician: Karlos Dias        Admission Diagnosis: Pregnancy [Z34.90]   Discharge Diagnosis: Same as above plus  Pregnancy at 38w4d - delivered  ghtn       Procedures: 2023  - Vaginal, Spontaneous       Hospital Course  Patient is a 38 y.o.  who at 38w4d had a vaginal birth.  Her postpartum course was without complications.  On PPD # 2 she was ready for discharge.  She had normal lochia and pain well controlled with oral medications.    Infant  male  fetus weighing 3790 g (8 lb 5.7 oz)   Apgars -  8 @ 1 minute /  9 @ 5 minutes.    Discharge labs  Lab Results   Component Value Date    WBC 13.25 (H) 2023    HGB 8.0 (L) 2023    HCT 23.7 (L) 2023     2023       Discharge Medications     Discharge Medications        New Medications        Instructions Start Date   docusate sodium 100 MG capsule   100 mg, Oral, 2 Times Daily      ibuprofen 600 MG tablet  Commonly known as: ADVIL,MOTRIN   600 mg, Oral, Every 6 Hours PRN             Changes to Medications        Instructions Start Date   ferrous sulfate 325 (65 FE) MG tablet  What changed:   medication strength  how much to take  when to take this   325 mg, Oral, Daily With Breakfast      Prenatal Vitamin 27-0.8 MG tablet  What changed: medication strength   1 tablet, Oral, Daily             Continue These Medications        Instructions Start Date   fish oil-omega-3 fatty acids 1000 MG capsule   Oral      OneTouch Delica Plus [id number] misc   No dose, route, or frequency recorded.      OneTouch Verio Flex System w/Device kit   No dose, route, or frequency recorded.              Stop These Medications      nitrofurantoin (macrocrystal-monohydrate) 100 MG capsule  Commonly known as: MACROBID     OneTouch Verio test strip  Generic drug: glucose blood              Discharge Disposition Home or Self Care   Condition on Discharge: good   Follow-up: 1 week with  Dr Dias for blood pressure check  Postpartum depression, mastitis, lochia and pre eclampsia warning signs reviewed      Sweetie Julian CNM  6/4/2023    Electronically signed by Irma Del Valle MD at 06/04/23 1124

## 2023-06-06 ENCOUNTER — HOSPITAL ENCOUNTER (INPATIENT)
Facility: HOSPITAL | Age: 38
LOS: 2 days | Discharge: HOME | End: 2023-06-08
Attending: OBSTETRICS & GYNECOLOGY | Admitting: OBSTETRICS & GYNECOLOGY
Payer: COMMERCIAL

## 2023-06-06 ENCOUNTER — APPOINTMENT (OUTPATIENT)
Dept: CT IMAGING | Facility: HOSPITAL | Age: 38
End: 2023-06-06
Payer: COMMERCIAL

## 2023-06-06 ENCOUNTER — APPOINTMENT (OUTPATIENT)
Dept: CARDIOLOGY | Facility: HOSPITAL | Age: 38
End: 2023-06-06
Payer: COMMERCIAL

## 2023-06-06 DIAGNOSIS — J81.0 ACUTE PULMONARY EDEMA: ICD-10-CM

## 2023-06-06 DIAGNOSIS — I27.20 PULMONARY HYPERTENSION: ICD-10-CM

## 2023-06-06 DIAGNOSIS — I50.31 ACUTE DIASTOLIC CHF (CONGESTIVE HEART FAILURE): Primary | ICD-10-CM

## 2023-06-06 PROBLEM — N71.9 ENDOMETRITIS: Status: ACTIVE | Noted: 2023-06-06

## 2023-06-06 PROBLEM — J81.1 PULMONARY EDEMA: Status: ACTIVE | Noted: 2023-06-06

## 2023-06-06 LAB
ABO GROUP BLD: NORMAL
ALBUMIN SERPL-MCNC: 3.2 G/DL (ref 3.5–5.2)
ALBUMIN/GLOB SERPL: 1.1 G/DL
ALP SERPL-CCNC: 115 U/L (ref 39–117)
ALT SERPL W P-5'-P-CCNC: 52 U/L (ref 1–33)
ANION GAP SERPL CALCULATED.3IONS-SCNC: 15 MMOL/L (ref 5–15)
ARTERIAL PATENCY WRIST A: ABNORMAL
AST SERPL-CCNC: 51 U/L (ref 1–32)
ATMOSPHERIC PRESS: ABNORMAL MM[HG]
BASE EXCESS BLDA CALC-SCNC: 1.7 MMOL/L (ref 0–2)
BDY SITE: ABNORMAL
BH CV ECHO LEFT VENTRICLE GLOBAL LONGITUDINAL STRAIN: -15 %
BH CV ECHO MEAS - AO MAX PG: 12 MMHG
BH CV ECHO MEAS - AO MEAN PG: 6.8 MMHG
BH CV ECHO MEAS - AO ROOT DIAM: 2.6 CM
BH CV ECHO MEAS - AO V2 MAX: 173 CM/SEC
BH CV ECHO MEAS - AO V2 VTI: 31.5 CM
BH CV ECHO MEAS - AVA(I,D): 1.89 CM2
BH CV ECHO MEAS - EDV(CUBED): 125 ML
BH CV ECHO MEAS - EDV(MOD-SP2): 108 ML
BH CV ECHO MEAS - EDV(MOD-SP4): 104 ML
BH CV ECHO MEAS - EF(MOD-BP): 50.3 %
BH CV ECHO MEAS - EF(MOD-SP2): 46.2 %
BH CV ECHO MEAS - EF(MOD-SP4): 51.2 %
BH CV ECHO MEAS - ESV(CUBED): 41.4 ML
BH CV ECHO MEAS - ESV(MOD-SP2): 58.1 ML
BH CV ECHO MEAS - ESV(MOD-SP4): 50.8 ML
BH CV ECHO MEAS - FS: 30.8 %
BH CV ECHO MEAS - IVS/LVPW: 0.7 CM
BH CV ECHO MEAS - IVSD: 0.7 CM
BH CV ECHO MEAS - LA DIMENSION: 4.5 CM
BH CV ECHO MEAS - LAT PEAK E' VEL: 7.3 CM/SEC
BH CV ECHO MEAS - LV MASS(C)D: 146.8 GRAMS
BH CV ECHO MEAS - LV MAX PG: 5.2 MMHG
BH CV ECHO MEAS - LV MEAN PG: 2.7 MMHG
BH CV ECHO MEAS - LV V1 MAX: 114.3 CM/SEC
BH CV ECHO MEAS - LV V1 VTI: 19.4 CM
BH CV ECHO MEAS - LVIDD: 5 CM
BH CV ECHO MEAS - LVIDS: 3.5 CM
BH CV ECHO MEAS - LVOT AREA: 3.1 CM2
BH CV ECHO MEAS - LVOT DIAM: 1.98 CM
BH CV ECHO MEAS - LVPWD: 1 CM
BH CV ECHO MEAS - MED PEAK E' VEL: 8.7 CM/SEC
BH CV ECHO MEAS - MV A MAX VEL: 83.2 CM/SEC
BH CV ECHO MEAS - MV DEC SLOPE: 763 CM/SEC2
BH CV ECHO MEAS - MV DEC TIME: 0.19 MSEC
BH CV ECHO MEAS - MV E MAX VEL: 146.3 CM/SEC
BH CV ECHO MEAS - MV E/A: 1.76
BH CV ECHO MEAS - MV MAX PG: 10.4 MMHG
BH CV ECHO MEAS - MV MEAN PG: 5 MMHG
BH CV ECHO MEAS - MV V2 VTI: 30.2 CM
BH CV ECHO MEAS - MVA(P1/2T): 3.38 CM2
BH CV ECHO MEAS - MVA(VTI): 1.97 CM2
BH CV ECHO MEAS - PA ACC TIME: 0.11 SEC
BH CV ECHO MEAS - PA PR(ACCEL): 28.3 MMHG
BH CV ECHO MEAS - PA V2 MAX: 126.7 CM/SEC
BH CV ECHO MEAS - RAP SYSTOLE: 8 MMHG
BH CV ECHO MEAS - RVSP: 61 MMHG
BH CV ECHO MEAS - SV(LVOT): 59.4 ML
BH CV ECHO MEAS - SV(MOD-SP2): 49.9 ML
BH CV ECHO MEAS - SV(MOD-SP4): 53.2 ML
BH CV ECHO MEAS - TAPSE (>1.6): 2.5 CM
BH CV ECHO MEAS - TR MAX PG: 53 MMHG
BH CV ECHO MEAS - TR MAX VEL: 363 CM/SEC
BH CV ECHO MEASUREMENTS AVERAGE E/E' RATIO: 18.29
BH CV XLRA - RV BASE: 3.7 CM
BH CV XLRA - RV LENGTH: 6.8 CM
BH CV XLRA - RV MID: 3.3 CM
BH CV XLRA - TDI S': 20.3 CM/SEC
BILIRUB SERPL-MCNC: 0.3 MG/DL (ref 0–1.2)
BLD GP AB SCN SERPL QL: NEGATIVE
BODY TEMPERATURE: 37 C
BUN SERPL-MCNC: 14 MG/DL (ref 6–20)
BUN/CREAT SERPL: 22.2 (ref 7–25)
CALCIUM SPEC-SCNC: 8.9 MG/DL (ref 8.6–10.5)
CHLORIDE SERPL-SCNC: 100 MMOL/L (ref 98–107)
CO2 BLDA-SCNC: 25.8 MMOL/L (ref 22–33)
CO2 SERPL-SCNC: 24 MMOL/L (ref 22–29)
COHGB MFR BLD: 1.4 % (ref 0–2)
CREAT SERPL-MCNC: 0.63 MG/DL (ref 0.57–1)
DEPRECATED RDW RBC AUTO: 48.1 FL (ref 37–54)
EGFRCR SERPLBLD CKD-EPI 2021: 117.3 ML/MIN/1.73
EPAP: 0
ERYTHROCYTE [DISTWIDTH] IN BLOOD BY AUTOMATED COUNT: 13.5 % (ref 12.3–15.4)
GLOBULIN UR ELPH-MCNC: 3 GM/DL
GLUCOSE SERPL-MCNC: 110 MG/DL (ref 65–99)
HCO3 BLDA-SCNC: 24.8 MMOL/L (ref 20–26)
HCT VFR BLD AUTO: 26.8 % (ref 34–46.6)
HCT VFR BLD CALC: 28 % (ref 38–51)
HGB BLD-MCNC: 8.8 G/DL (ref 12–15.9)
HGB BLDA-MCNC: 9.1 G/DL (ref 14–18)
INHALED O2 CONCENTRATION: 21 %
IPAP: 0
LDH SERPL-CCNC: 252 U/L (ref 135–214)
LEFT ATRIUM VOLUME INDEX: 33.4 ML/M2
MCH RBC QN AUTO: 33.1 PG (ref 26.6–33)
MCHC RBC AUTO-ENTMCNC: 32.8 G/DL (ref 31.5–35.7)
MCV RBC AUTO: 100.8 FL (ref 79–97)
METHGB BLD QL: 0.5 % (ref 0–1.5)
MODALITY: ABNORMAL
NOTE: ABNORMAL
NT-PROBNP SERPL-MCNC: 1003 PG/ML (ref 0–450)
OXYHGB MFR BLDV: 91.7 % (ref 94–99)
PAW @ PEAK INSP FLOW SETTING VENT: 0 CMH2O
PCO2 BLDA: 32.5 MM HG (ref 35–45)
PCO2 TEMP ADJ BLD: 32.5 MM HG (ref 35–45)
PH BLDA: 7.49 PH UNITS (ref 7.35–7.45)
PH, TEMP CORRECTED: 7.49 PH UNITS
PLATELET # BLD AUTO: 272 10*3/MM3 (ref 140–450)
PMV BLD AUTO: 10.9 FL (ref 6–12)
PO2 BLDA: 66.4 MM HG (ref 83–108)
PO2 TEMP ADJ BLD: 66.4 MM HG (ref 83–108)
POTASSIUM SERPL-SCNC: 4.2 MMOL/L (ref 3.5–5.2)
PROT SERPL-MCNC: 6.2 G/DL (ref 6–8.5)
RBC # BLD AUTO: 2.66 10*6/MM3 (ref 3.77–5.28)
RH BLD: POSITIVE
SODIUM SERPL-SCNC: 139 MMOL/L (ref 136–145)
T&S EXPIRATION DATE: NORMAL
TOTAL RATE: 0 BREATHS/MINUTE
URATE SERPL-MCNC: 5.7 MG/DL (ref 2.4–5.7)
WBC NRBC COR # BLD: 14.54 10*3/MM3 (ref 3.4–10.8)

## 2023-06-06 PROCEDURE — 0 MAGNESIUM SULFATE 20 GM/500ML SOLUTION: Performed by: OBSTETRICS & GYNECOLOGY

## 2023-06-06 PROCEDURE — 25010000002 FUROSEMIDE PER 20 MG

## 2023-06-06 PROCEDURE — 82805 BLOOD GASES W/O2 SATURATION: CPT

## 2023-06-06 PROCEDURE — 25010000002 AMPICILLIN PER 500 MG: Performed by: OBSTETRICS & GYNECOLOGY

## 2023-06-06 PROCEDURE — 25010000002 FUROSEMIDE PER 20 MG: Performed by: OBSTETRICS & GYNECOLOGY

## 2023-06-06 PROCEDURE — 25510000001 IOPAMIDOL PER 1 ML: Performed by: OBSTETRICS & GYNECOLOGY

## 2023-06-06 PROCEDURE — 93306 TTE W/DOPPLER COMPLETE: CPT | Performed by: INTERNAL MEDICINE

## 2023-06-06 PROCEDURE — 83050 HGB METHEMOGLOBIN QUAN: CPT

## 2023-06-06 PROCEDURE — 36415 COLL VENOUS BLD VENIPUNCTURE: CPT | Performed by: OBSTETRICS & GYNECOLOGY

## 2023-06-06 PROCEDURE — 99222 1ST HOSP IP/OBS MODERATE 55: CPT | Performed by: OBSTETRICS & GYNECOLOGY

## 2023-06-06 PROCEDURE — 82375 ASSAY CARBOXYHB QUANT: CPT

## 2023-06-06 PROCEDURE — 86901 BLOOD TYPING SEROLOGIC RH(D): CPT | Performed by: OBSTETRICS & GYNECOLOGY

## 2023-06-06 PROCEDURE — 85027 COMPLETE CBC AUTOMATED: CPT | Performed by: OBSTETRICS & GYNECOLOGY

## 2023-06-06 PROCEDURE — 83615 LACTATE (LD) (LDH) ENZYME: CPT | Performed by: OBSTETRICS & GYNECOLOGY

## 2023-06-06 PROCEDURE — 86900 BLOOD TYPING SEROLOGIC ABO: CPT | Performed by: OBSTETRICS & GYNECOLOGY

## 2023-06-06 PROCEDURE — 93306 TTE W/DOPPLER COMPLETE: CPT

## 2023-06-06 PROCEDURE — 25010000002 GENTAMICIN PER 80 MG: Performed by: OBSTETRICS & GYNECOLOGY

## 2023-06-06 PROCEDURE — 83880 ASSAY OF NATRIURETIC PEPTIDE: CPT | Performed by: OBSTETRICS & GYNECOLOGY

## 2023-06-06 PROCEDURE — 86850 RBC ANTIBODY SCREEN: CPT | Performed by: OBSTETRICS & GYNECOLOGY

## 2023-06-06 PROCEDURE — 71275 CT ANGIOGRAPHY CHEST: CPT

## 2023-06-06 PROCEDURE — 36600 WITHDRAWAL OF ARTERIAL BLOOD: CPT

## 2023-06-06 PROCEDURE — 99222 1ST HOSP IP/OBS MODERATE 55: CPT

## 2023-06-06 PROCEDURE — 80053 COMPREHEN METABOLIC PANEL: CPT | Performed by: OBSTETRICS & GYNECOLOGY

## 2023-06-06 PROCEDURE — 84550 ASSAY OF BLOOD/URIC ACID: CPT | Performed by: OBSTETRICS & GYNECOLOGY

## 2023-06-06 RX ORDER — MAGNESIUM SULFATE HEPTAHYDRATE 40 MG/ML
1 INJECTION, SOLUTION INTRAVENOUS CONTINUOUS
Status: DISCONTINUED | OUTPATIENT
Start: 2023-06-06 | End: 2023-06-07

## 2023-06-06 RX ORDER — SODIUM CHLORIDE 0.9 % (FLUSH) 0.9 %
10 SYRINGE (ML) INJECTION AS NEEDED
Status: DISCONTINUED | OUTPATIENT
Start: 2023-06-06 | End: 2023-06-08 | Stop reason: HOSPADM

## 2023-06-06 RX ORDER — LIDOCAINE HYDROCHLORIDE 10 MG/ML
5 INJECTION, SOLUTION EPIDURAL; INFILTRATION; INTRACAUDAL; PERINEURAL AS NEEDED
Status: DISCONTINUED | OUTPATIENT
Start: 2023-06-06 | End: 2023-06-08 | Stop reason: HOSPADM

## 2023-06-06 RX ORDER — SODIUM CHLORIDE 0.9 % (FLUSH) 0.9 %
10 SYRINGE (ML) INJECTION EVERY 12 HOURS SCHEDULED
Status: DISCONTINUED | OUTPATIENT
Start: 2023-06-06 | End: 2023-06-08 | Stop reason: HOSPADM

## 2023-06-06 RX ORDER — FUROSEMIDE 10 MG/ML
40 INJECTION INTRAMUSCULAR; INTRAVENOUS ONCE
Status: COMPLETED | OUTPATIENT
Start: 2023-06-06 | End: 2023-06-06

## 2023-06-06 RX ORDER — HYDROXYZINE HYDROCHLORIDE 25 MG/1
50 TABLET, FILM COATED ORAL NIGHTLY PRN
Status: DISCONTINUED | OUTPATIENT
Start: 2023-06-06 | End: 2023-06-08 | Stop reason: HOSPADM

## 2023-06-06 RX ORDER — SODIUM CHLORIDE 0.9 % (FLUSH) 0.9 %
1-10 SYRINGE (ML) INJECTION AS NEEDED
Status: DISCONTINUED | OUTPATIENT
Start: 2023-06-06 | End: 2023-06-08 | Stop reason: HOSPADM

## 2023-06-06 RX ORDER — NIFEDIPINE 10 MG/1
10 CAPSULE ORAL ONCE
Status: COMPLETED | OUTPATIENT
Start: 2023-06-06 | End: 2023-06-06

## 2023-06-06 RX ORDER — DEXTROSE MONOHYDRATE 50 MG/ML
75 INJECTION, SOLUTION INTRAVENOUS CONTINUOUS
Status: DISCONTINUED | OUTPATIENT
Start: 2023-06-06 | End: 2023-06-06

## 2023-06-06 RX ORDER — IBUPROFEN 600 MG/1
600 TABLET ORAL EVERY 6 HOURS PRN
Status: DISCONTINUED | OUTPATIENT
Start: 2023-06-06 | End: 2023-06-08 | Stop reason: HOSPADM

## 2023-06-06 RX ORDER — NIFEDIPINE 30 MG/1
30 TABLET, EXTENDED RELEASE ORAL EVERY 12 HOURS SCHEDULED
Status: DISCONTINUED | OUTPATIENT
Start: 2023-06-06 | End: 2023-06-08 | Stop reason: HOSPADM

## 2023-06-06 RX ORDER — METOPROLOL SUCCINATE 25 MG/1
25 TABLET, EXTENDED RELEASE ORAL
Status: DISCONTINUED | OUTPATIENT
Start: 2023-06-06 | End: 2023-06-07

## 2023-06-06 RX ORDER — SODIUM CHLORIDE 0.9 % (FLUSH) 0.9 %
10 SYRINGE (ML) INJECTION EVERY 8 HOURS
Status: DISCONTINUED | OUTPATIENT
Start: 2023-06-06 | End: 2023-06-08 | Stop reason: HOSPADM

## 2023-06-06 RX ORDER — DEXTROSE AND SODIUM CHLORIDE 5; .45 G/100ML; G/100ML
75 INJECTION, SOLUTION INTRAVENOUS CONTINUOUS
Status: DISCONTINUED | OUTPATIENT
Start: 2023-06-06 | End: 2023-06-07

## 2023-06-06 RX ADMIN — IOPAMIDOL 100 ML: 755 INJECTION, SOLUTION INTRAVENOUS at 05:52

## 2023-06-06 RX ADMIN — MAGNESIUM SULFATE IN WATER 1 G/HR: 40 INJECTION, SOLUTION INTRAVENOUS at 19:34

## 2023-06-06 RX ADMIN — NIFEDIPINE 30 MG: 30 TABLET, EXTENDED RELEASE ORAL at 20:47

## 2023-06-06 RX ADMIN — IBUPROFEN 600 MG: 600 TABLET ORAL at 14:45

## 2023-06-06 RX ADMIN — GENTAMICIN SULFATE 350 MG: 40 INJECTION, SOLUTION INTRAMUSCULAR; INTRAVENOUS at 11:03

## 2023-06-06 RX ADMIN — DEXTROSE AND SODIUM CHLORIDE 75 ML/HR: 5; 450 INJECTION, SOLUTION INTRAVENOUS at 19:58

## 2023-06-06 RX ADMIN — MAGNESIUM SULFATE IN WATER 1 G/HR: 40 INJECTION, SOLUTION INTRAVENOUS at 08:16

## 2023-06-06 RX ADMIN — METOPROLOL SUCCINATE 25 MG: 25 TABLET, EXTENDED RELEASE ORAL at 11:55

## 2023-06-06 RX ADMIN — AMPICILLIN SODIUM 2 G: 2 INJECTION, POWDER, FOR SOLUTION INTRAVENOUS at 10:29

## 2023-06-06 RX ADMIN — FUROSEMIDE 40 MG: 10 INJECTION, SOLUTION INTRAMUSCULAR; INTRAVENOUS at 06:30

## 2023-06-06 RX ADMIN — IBUPROFEN 600 MG: 600 TABLET ORAL at 20:47

## 2023-06-06 RX ADMIN — NIFEDIPINE 30 MG: 30 TABLET, EXTENDED RELEASE ORAL at 09:17

## 2023-06-06 RX ADMIN — DEXTROSE MONOHYDRATE 75 ML/HR: 50 INJECTION, SOLUTION INTRAVENOUS at 06:33

## 2023-06-06 RX ADMIN — AMPICILLIN SODIUM 2 G: 2 INJECTION, POWDER, FOR SOLUTION INTRAVENOUS at 15:47

## 2023-06-06 RX ADMIN — FUROSEMIDE 40 MG: 40 INJECTION, SOLUTION INTRAMUSCULAR; INTRAVENOUS at 14:46

## 2023-06-06 RX ADMIN — NIFEDIPINE 10 MG: 10 CAPSULE ORAL at 03:38

## 2023-06-06 RX ADMIN — Medication 10 ML: at 19:58

## 2023-06-06 RX ADMIN — AMPICILLIN SODIUM 2 G: 2 INJECTION, POWDER, FOR SOLUTION INTRAVENOUS at 20:46

## 2023-06-06 RX ADMIN — IBUPROFEN 600 MG: 600 TABLET ORAL at 09:17

## 2023-06-06 NOTE — LACTATION NOTE
06/06/23 1600   Maternal Information   Date of Referral 06/06/23   Person Making Referral lactation consultant  (courtesy consult)   Maternal Reason for Referral   ( other babies for 1-2 hours.)   Infant Reason for Referral   (reports baby is breastfeeding well)   Milk Expression/Equipment   Breast Pump Type double electric, personal  (mom is getting personal pump from home--FOB is bringing to hospital)   Breast Pumping   Breast Pumping Interventions post-feed pumping encouraged  (can pump for missed feedings and if giving formula)

## 2023-06-06 NOTE — H&P
[facility] [place]    Referring Provider: Karlos Dias MD      Chief Complaint   Patient presents with    Elevated Blood Pressure   Fever  Shortness of air    Subjective     Patient is a 38 y.o. female  4 days postpartum presented to L&D overnight with elevated blood pressure, subjective fever, and shortness of air.  She was found to have abnormal PEP consistent with preeclampsia along with severe range BP.  She was found to have pulmonary edema as well.     Her prenatal care is complicated by  hypertension  gestational hypertension.  She delivered vaginally and peripartum course complicated by chorioamnionitis. She was treated with IV abx x 24 hours without fever prior to DC. Her previous obstetric/gynecological history is noted for is remarkable for severe preeclampsia with prior delivery.     The following portions of the patients history were reviewed and updated as appropriate: current medications, allergies, past medical history, past surgical history, past family history, past social history, and problem list .       Prenatal Information:   Maternal Prenatal Labs  Blood Type ABO Type   Date Value Ref Range Status   2023 O  Final      Rh Status RH type   Date Value Ref Range Status   2023 Positive  Final      Antibody Screen Antibody Screen   Date Value Ref Range Status   2023 Negative  Final      Gonnorhea No results found for: GCCX   Chlamydia No results found for: CLAMYDCU   RPR No results found for: RPR   Syphilis Antibody No results found for: SYPHILIS   Rubella No results found for: RUBELLAIGGIN   Hepatitis B Surface Antigen No results found for: HEPBSAG   HIV-1 Antibody No results found for: LABHIV1   Hepatitis C Antibody No results found for: HEPCAB   Rapid Urin Drug Screen No results found for: AMPMETHU, BARBITSCNUR, LABBENZSCN, LABMETHSCN, LABOPIASCN, THCURSCR, COCAINEUR, AMPHETSCREEN, PROPOXSCN, BUPRENORSCNU, METAMPSCNUR, OXYCODONESCN, TRICYCLICSCN    Group B Strep Culture No results found for: GBSANTIGEN           External Prenatal Results       Pregnancy Outside Results - Transcribed From Office Records - See Scanned Records For Details       Test Value Date Time    ABO  O  06/06/23 0305    Rh  Positive  06/06/23 0305    Antibody Screen  Negative  06/06/23 0305       Negative  06/01/23 1648    Varicella IgG       Rubella       Hgb  8.8 g/dL 06/06/23 0306       8.0 g/dL 06/03/23 0725       9.5 g/dL 06/01/23 1648       9.3 g/dL 05/30/23 1909       10.5 g/dL 05/10/23 1147      ^ 10.7 g/dL 04/17/23 1442      ^ 11.2 g/dL 03/20/23 0848      ^ 12.2 g/dL 11/14/22 1713    Hct  26.8 % 06/06/23 0306       23.7 % 06/03/23 0725       28.0 % 06/01/23 1648       27.7 % 05/30/23 1909       30.7 % 05/10/23 1147      ^ 31.8 % 04/17/23 1442      ^ 34.0 % 03/20/23 0848      ^ 36.0 % 11/14/22 1713    Glucose Fasting GTT       Glucose Tolerance Test 1 hour       Glucose Tolerance Test 3 hour       Gonorrhea (discrete)  Negative  12/01/15 1034    Chlamydia (discrete)       RPR       VDRL       Syphilis Antibody       HBsAg ^ Negative  11/14/22 1713    Herpes Simplex Virus PCR       Herpes Simplex VIrus Culture       HIV ^ Nonreactive  11/14/22 1713    Hep C RNA Quant PCR       Hep C Antibody       AFP       Group B Strep  No Group B Streptococcus isolated  05/18/23 1816    GBS Susceptibility to Clindamycin       GBS Susceptibility to Erythromycin       Fetal Fibronectin       Genetic Testing, Maternal Blood                 Drug Screening       Test Value Date Time    Urine Drug Screen       Amphetamine Screen  Negative ng/mL 05/22/23 1100    Barbiturate Screen  Negative ng/mL 05/22/23 1100    Benzodiazepine Screen  Negative ng/mL 05/22/23 1100    Methadone Screen  Negative ng/mL 05/22/23 1100    Phencyclidine Screen  Negative ng/mL 05/22/23 1100    Opiates Screen       THC Screen       Cocaine Screen       Propoxyphene Screen  Negative ng/mL 05/22/23 1100    Buprenorphine Screen   Negative ng/mL 12/01/15 1034    Methamphetamine Screen       Oxycodone Screen  Negative ng/mL 12/01/15 1034    Tricyclic Antidepressants Screen                 Legend    ^: Historical                              Past OB History:       OB History    Para Term  AB Living   5 4 4 0 1 4   SAB IAB Ectopic Molar Multiple Live Births   1 0 0 0 0 4      # Outcome Date GA Lbr Noah/2nd Weight Sex Delivery Anes PTL Lv   5 Term 23 38w4d 15:53 / 00:17 3790 g (8 lb 5.7 oz) M Vag-Spont EPI N JONN      Name: MAAME LUNA      Apgar1: 8  Apgar5: 9   4 Term 16 37w4d  3260 g (7 lb 3 oz) M Vag-Spont EPI  JONN      Complications: Pregnancy-induced hypertension   3 Term 04/04/15 38w2d  3459 g (7 lb 10 oz) F Vag-Spont EPI  JONN      Complications: Pregnancy-induced hypertension   2 SAB            1 Term 07/08/10 39w0d  2977 g (6 lb 9 oz) M Vag-Spont EPI  JONN      Complications: Pregnancy-induced hypertension       Past Medical History: Past Medical History:   Diagnosis Date    Anemia 2022    Anxiety     Chronic hypertension     Gestational diabetes     Gestational hypertension     History of pre-eclampsia 2015    History of recurrent UTIs     Pulmonary edema 2015      Past Surgical History Past Surgical History:   Procedure Laterality Date    NO PAST SURGERIES        Family History: Family History   Problem Relation Age of Onset    Diabetes type II Father     Hypertension Father     Hypertension Mother     Other Son         bifid uvula and polydactyly    Breast cancer Neg Hx     Colon cancer Neg Hx     Ovarian cancer Neg Hx       Social History:  reports that she has never smoked. She does not have any smokeless tobacco history on file.   reports that she does not currently use alcohol after a past usage of about 2.0 standard drinks per week.   reports no history of drug use.                   General ROS Negative Findings:Headaches, Visual Changes, Epigastric pain, Anorexia, Nausia/Vomiting,  ROM, and Vaginal Bleeding    Review of Systems   Constitutional: Positive for chills, fever and malaise/fatigue.   HENT: Negative.     Cardiovascular:  Positive for dyspnea on exertion. Negative for chest pain, irregular heartbeat, near-syncope, palpitations and syncope.   Respiratory:  Positive for shortness of breath. Negative for cough and hemoptysis.    Endocrine: Negative.    Hematologic/Lymphatic: Negative.    Skin: Negative.    Musculoskeletal: Negative.    Gastrointestinal: Negative.    Genitourinary: Negative.    Neurological: Negative.    Psychiatric/Behavioral: Negative.        All other systems have been reviewed and are neg  Objective       Vital Signs Range for the last 24 hours  Temperature: Temp:  [98.9 °F (37.2 °C)-99.8 °F (37.7 °C)] 99.8 °F (37.7 °C)   Temp Source: Temp src: Oral   BP: BP: (144-154)/() 146/83   Pulse: Heart Rate:  [] 101   Respirations: Resp:  [16-20] 18   SPO2: SpO2:  [92 %-96 %] 93 %   O2 Amount (l/min):     O2 Devices Device (Oxygen Therapy): room air   Weight: Weight:  [92 kg (202 lb 12.8 oz)-97.5 kg (215 lb)] 92 kg (202 lb 12.8 oz)     Physical Examination:   General:   alert, appears stated age, cooperative, and moderate distress   Skin:   normal   HEENT:     Lungs:   diminished breath sounds bilaterally   Heart:   regular rate and rhythm, S1, S2 normal, no murmur, click, rub or gallop   Abdomen:  soft, non-tender; bowel sounds normal; no masses,  no organomegaly   Lower Extremities    Pelvis:  Exam deferred.         Laboratory Results:   Lab Results (last 24 hours)       Procedure Component Value Units Date/Time    Blood Gas, Arterial With Co-Ox [[id number]]  (Abnormal) Collected: 06/06/23 0553    Specimen: Arterial Blood Updated: 06/06/23 0553     Site Right Radial     Sekou's Test N/A     pH, Arterial 7.491 pH units      Comment: 83 Value above reference range        pCO2, Arterial 32.5 mm Hg      Comment: 84 Value below reference range        pO2, Arterial 66.4  mm Hg      Comment: 84 Value below reference range        HCO3, Arterial 24.8 mmol/L      Base Excess, Arterial 1.7 mmol/L      Hemoglobin, Blood Gas 9.1 g/dL      Comment: 84 Value below reference range        Hematocrit, Blood Gas 28.0 %      Oxyhemoglobin 91.7 %      Comment: 84 Value below reference range        Methemoglobin 0.50 %      Carboxyhemoglobin 1.4 %      CO2 Content 25.8 mmol/L      Temperature 37.0 C      Barometric Pressure for Blood Gas --     Comment: N/A        Modality Room Air     FIO2 21 %      Rate 0 Breaths/minute      PIP 0 cmH2O      Comment: Meter: [id number]     :  [id number]        IPAP 0     EPAP 0     Note --     pH, Temp Corrected 7.491 pH Units      pCO2, Temperature Corrected 32.5 mm Hg      pO2, Temperature Corrected 66.4 mm Hg     Comprehensive Metabolic Panel [[id number]]  (Abnormal) Collected: 06/06/23 0300    Specimen: Blood Updated: 06/06/23 0422     Glucose 110 mg/dL      BUN 14 mg/dL      Creatinine 0.63 mg/dL      Sodium 139 mmol/L      Potassium 4.2 mmol/L      Chloride 100 mmol/L      CO2 24.0 mmol/L      Calcium 8.9 mg/dL      Total Protein 6.2 g/dL      Albumin 3.2 g/dL      ALT (SGPT) 52 U/L      AST (SGOT) 51 U/L      Alkaline Phosphatase 115 U/L      Total Bilirubin 0.3 mg/dL      Globulin 3.0 gm/dL      Comment: Calculated Result        A/G Ratio 1.1 g/dL      BUN/Creatinine Ratio 22.2     Anion Gap 15.0 mmol/L      eGFR 117.3 mL/min/1.73     Narrative:      GFR Normal >60  Chronic Kidney Disease <60  Kidney Failure <15      Lactate Dehydrogenase [[id number]]  (Abnormal) Collected: 06/06/23 0300    Specimen: Blood Updated: 06/06/23 0422      U/L     Uric Acid [[id number]]  (Normal) Collected: 06/06/23 0300    Specimen: Blood Updated: 06/06/23 0422     Uric Acid 5.7 mg/dL      Comment: Falsely depressed results may occur on samples drawn from patients receiving N-Acetylcysteine (NAC) or Metamizole.       BNP [[id number]]  (Abnormal) Collected:  06/06/23 0300    Specimen: Blood Updated: 06/06/23 0414     proBNP 1,003.0 pg/mL     Narrative:      Among patients with dyspnea, NT-proBNP is highly sensitive for the detection of acute congestive heart failure. In addition NT-proBNP of <300 pg/ml effectively rules out acute congestive heart failure with 99% negative predictive value.      CBC (No Diff) [[id number]]  (Abnormal) Collected: 06/06/23 0306    Specimen: Blood Updated: 06/06/23 0334     WBC 14.54 10*3/mm3      RBC 2.66 10*6/mm3      Hemoglobin 8.8 g/dL      Hematocrit 26.8 %      .8 fL      MCH 33.1 pg      MCHC 32.8 g/dL      RDW 13.5 %      RDW-SD 48.1 fl      MPV 10.9 fL      Platelets 272 10*3/mm3           Radiology Review:   Imaging Results (Last 24 Hours)       Procedure Component Value Units Date/Time    CT Angiogram Chest [[id number]] Collected: 06/06/23 0416     Updated: 06/06/23 0619    Narrative:      Exam: CT Angiography of the Chest.    Date: 6/6/2023.     Comparison: None    History: 4 days postpartum with productive cough and shortness of breath.    Technique: CT examination of the chest was performed following the intravenous administration of 80 mL of Isovue-370. Sagittal, coronal and 3-D reformatted images were provided. CT dose lowering techniques were used, to include: automated exposure   control, adjustment for patient size, and/or use of iterative reconstruction.    FINDINGS:    Mediastinum and [place]: There is no axillary, mediastinal or hilar lymphadenopathy.    Pleural and Pericardial spaces: There are small-to-moderate bilateral pleural effusions.    Upper Abdomen: The visualized upper abdomen is unremarkable.    Cardiovascular: The thoracic aorta is normal in size without evidence of aneurysm or dissection. The heart is mildly enlarged.    Pulmonary Artery: There are no filling defects in the pulmonary arteries.    Lung Parenchyma and Airways: There is scattered septal thickening seen diffusely throughout the lungs  bilaterally which is likely related to pulmonary edema.    Bones: No fracture or aggressive osseous lesion.      Impression:        1. No evidence of pulmonary embolism.  2. No evidence of thoracic aortic aneurysm or dissection.  3. Mild cardiomegaly with mild pulmonary edema and small to moderate bilateral pleural effusions.      Electronically signed by:  Louis Cervantes D.O.    2023 4:18 AM Mountain Time          Other Studies:    Assessment & Plan       Pulmonary edema    Endometritis        Assessment:  1.  38 year old multiparous female PPD number 4 s/p   2.  Severe preeclampsia  3. Endometritis  4.  Pulmonary edema    Plan:  1. Patient admitted  2. Severe preeclampsia- Start magnesium sulfate for seizure prophylaxis. Nifedipine XL 30 mg daily for severe range BP. Will monitor labs.  3. Pulmonary edema- ECHO in progress now. Cardiology consulted.  Responding well to dose of lasix this morning.   4. Will start back on ampicillin and gentamicin due to likely endometritis. If fever increases or no response to Abx will proceed with TVUS.     Karlos Dias MD  2023  08:32 EDT

## 2023-06-06 NOTE — CONSULTS
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 Garnica is a 39 y/o female who we are seeing in consultation for postpartum pulmonary edema.  She is G5, P4 and had a spontaneous vaginal birth on 23 at 38 weeks 4 days gestation.  She was discharged home on postpartum day 2 without complications.  She presented to [facility] on 2023 with complaints of acute shortness of breath became noticeably worse that morning around 0100.  Her blood pressures have been moderately elevated in the 140s and 150s systolic. She reports continued shortness of breath today. Her oxygen saturations have been >90% on room air. She reports a history of pulmonary edema following prior vaginal deliveries with echocardiograms in those instances being normal. Her previous peripartum care has been at  and [facility]. ABG showed a pH of 7.49 and a PO2 of 66.4.  proBNP 1000.  Hemoglobin 8.8.  WBC 14.5. ALT and AST slightly elevated at 52 and 51 respectively.  Albumin 3.2. CTA chest showed no evidence of PE or aortic disease, but did reveal mild cardiomegaly, mild pulmonary edema and small to moderate bilateral pleural effusions.  Echocardiogram with low normal LV systolic function, mild to moderate MR in the setting of anemia, and pulmonary  "hypertension.      ROS: All systems have been reviewed and are negative with the exception of those mentioned in the HPI and problem list above.    HOME MEDICINES:   Current Outpatient Medications   Medication Instructions    Blood Glucose Monitoring Suppl (OneTouch Verio Flex System) w/Device kit No dose, route, or frequency recorded.    docusate sodium (COLACE) 100 mg, Oral, 2 Times Daily    ferrous sulfate 325 mg, Oral, Daily With Breakfast    fish oil-omega-3 fatty acids 1000 MG capsule Oral    ibuprofen (ADVIL,MOTRIN) 600 mg, Oral, Every 6 Hours PRN    Lancets (OneTouch Delica Plus [id number]) misc No dose, route, or frequency recorded.    Prenatal Vit-Fe Fumarate-FA (Prenatal Vitamin) 27-0.8 MG tablet 1 tablet, Oral, Daily       Surgical History:   Past Surgical History:   Procedure Laterality Date    NO PAST SURGERIES         Social History:   Social History     Socioeconomic History    Marital status:    Tobacco Use    Smoking status: Never   Vaping Use    Vaping Use: Never used   Substance and Sexual Activity    Alcohol use: Not Currently     Alcohol/week: 2.0 standard drinks     Types: 2 Cans of beer per week     Comment: socially when not pregnant    Drug use: Never    Sexual activity: Yes     Partners: Male     Birth control/protection: None       Family History:   Family History   Problem Relation Age of Onset    Diabetes type II Father     Hypertension Father     Hypertension Mother     Other Son         bifid uvula and polydactyly    Breast cancer Neg Hx     Colon cancer Neg Hx     Ovarian cancer Neg Hx        Objective     /83   Pulse 101   Temp 99.8 °F (37.7 °C)   Resp 18   Ht 162.6 cm (64\")   Wt 92 kg (202 lb 12.8 oz)   LMP 09/05/2022   SpO2 93%   BMI 34.81 kg/m²     Intake/Output Summary (Last 24 hours) at 6/6/2023 0912  Last data filed at 6/6/2023 0800  Gross per 24 hour   Intake --   Output 1000 ml   Net -1000 ml       PHYSICAL EXAM:  CONSTITUTIONAL: Well nourished, " cooperative, in no acute distress  CARDIOVASCULAR:  Regular rhythm and tachycardic rate, 1/6 hsm at apex   RESPIRATORY: decreased basilar breath sounds bilaterally   EXTREMITIES: Trace to 1+ pedal/pretibial edema. Peripheral pulses are present and equal bilaterally  SKIN: Warm, dry. No bleeding, bruising or rash  NEUROLOGICAL: No gross focal deficits  PSYCHIATRIC: Normal mood and affect. Behavior is normal     Labs/Diagnostic Data  Results from last 7 days   Lab Units 06/06/23  0300 06/01/23  1648 05/30/23  1909   SODIUM mmol/L 139  --   --    POTASSIUM mmol/L 4.2  --   --    CHLORIDE mmol/L 100  --   --    CO2 mmol/L 24.0  --   --    BUN mg/dL 14  --   --    CREATININE mg/dL 0.63 0.53* 0.48*   GLUCOSE mg/dL 110*  --   --    CALCIUM mg/dL 8.9  --   --          Results from last 7 days   Lab Units 06/06/23  0306 06/03/23  0725 06/01/23  1648   WBC 10*3/mm3 14.54* 13.25* 8.27   HEMOGLOBIN g/dL 8.8* 8.0* 9.5*   HEMATOCRIT % 26.8* 23.7* 28.0*   PLATELETS 10*3/mm3 272 167 175                     Results from last 7 days   Lab Units 06/06/23  0300   PROBNP pg/mL 1,003.0*           I personally reviewed the patient's EKG/Telemetry data    Radiology Data:   CTA Chest 6/6/23:  IMPRESSION:     1. No evidence of pulmonary embolism.  2. No evidence of thoracic aortic aneurysm or dissection.  3. Mild cardiomegaly with mild pulmonary edema and small to moderate bilateral pleural effusions.    Echo 6/6/23:    Left ventricular systolic function is normal. Calculated left ventricular EF = 50.3% Left ventricular ejection fraction appears to be 51 - 55%.    Left ventricular wall thickness is consistent with hypertrophy.    Left ventricular diastolic function was normal.    Mild to moderate mitral valve regurgitation is present    Moderate tricuspid valve regurgitation is present.    Estimated right ventricular systolic pressure from tricuspid regurgitation is markedly elevated (>55 mmHg). Calculated right ventricular systolic pressure  from tricuspid regurgitation is 61 mmHg.    Current Medications:    ampicillin, 2 g, Intravenous, Q6H  gentamicin, 5 mg/kg (Adjusted), Intravenous, Q24H  NIFEdipine XL, 30 mg, Oral, Q12H  sodium chloride, 10 mL, Intravenous, Q8H  sodium chloride, 10 mL, Intravenous, Q12H      dextrose, 75 mL/hr, Last Rate: 75 mL/hr (06/06/23 0633)  magnesium sulfate, 1 g/hr        Assessment  Preeclampsia with Pulmonary Edema  Echo 6/6/23: EF 50%, LVH noted, mild-mod MR, mod TR with RVSP >55 mmHg  Postpartum Anemia  Hgb 8.8      Plan  The patient has pulmonary edema with bilateral pleural effusions 4 days PP in the setting of preeclampsia and anemia.  Recommend supportive care with blood pressure control and diuretics with renal and electrolyte monitoring.  Will add metoprolol succinate for heart rate and added blood pressure control. Take for 3 weeks then wean off with a dose every other day x1 week then can discontinue.  Echocardiogram with LV EF 51-55%, mild to moderate MR and signs of pulmonary hypertension. Results in setting of anemia. Schedule follow up echo in 3-6 months.  Repeat dose of 40 mg IV Lasix added this afternoon with further diuretic management per primary team.      Scribed by Filipe May PA-C for Dr. Panfilo Mishra MD on 06/06/23

## 2023-06-06 NOTE — PAYOR COMM NOTE
"Karen Luna (38 y.o. Female) subscriber number [id number]  initial clinicals  WB      Date of Birth   1985    Social Security Number       Address   [address] [place] [address]    Home Phone   [phone]    MRN   [id number]       Sabianism   None    Marital Status                               Admission Date   23    Admission Type   Elective    Admitting Provider   Skyler Sapp III, MD    Attending Provider   Karlos Dias MD    Department, Room/Bed   [facility] ANTEPARTUM, N323       Discharge Date       Discharge Disposition       Discharge Destination                                 Attending Provider: Karlos Dias MD    Allergies: No Known Allergies    Isolation: None   Infection: None   Code Status: CPR    Ht: 162.6 cm (64\")   Wt: 92 kg (202 lb 12.8 oz)    Admission Cmt: None   Principal Problem: Pulmonary edema [J81.1]                   Active Insurance as of 2023       Primary Coverage       Payor Plan Insurance Group Employer/Plan Group    ANTHEM BLUE CROSS ANTHEM BLUE CROSS BLUE SHIELD PPO [id number]       Payor Plan Address Payor Plan Phone Number Payor Plan Fax Number Effective Dates    [address] [phone]  2021 - None Entered    [facility] [id number]         Subscriber Name Subscriber Birth Date Member ID       KAREN LUNA 1985 [id number]               Secondary Coverage       Payor Plan Insurance Group Employer/Plan Group    [place]        Payor Plan Address Payor Plan Phone Number Payor Plan Fax Number Effective Dates    [address]   10/1/2019 - None Entered    [facility] [address]         Subscriber Name Subscriber Birth Date Member ID       KAREN LUNA 1985 [id number]                     Emergency Contacts        (Rel.) Home Phone Work Phone Mobile Phone    JEREMYDahlia HUDSON (Spouse) -- -- [phone]              Insurance " Information                  ANTHEM BLUE CROSS/ANTH BLUE CROSS BLUE SHIELD PPO Phone: [phone]    Subscriber: Karen Garnica Subscriber#: [id number]    Group#: [id number] Precert#: --        [place] Phone: --    Subscriber: Karen Garnica Subscriber#: [id number]    Group#: -- Precert#: --          Problem List             Codes Noted - Resolved       Hospital    * (Principal) Pulmonary edema ICD-10-CM: J81.1  ICD-9-CM: 514 2023 - Present    Endometritis ICD-10-CM: N71.9  ICD-9-CM: 615.9 2023 - Present       Non-Hospital     (spontaneous vaginal delivery) ICD-10-CM: O80  ICD-9-CM: 650 2023 - Present        History & Physical        Karlos Dias MD at 23 0832          [facility]  Obstetric History and Physical    Referring Provider: Karlos Dias MD      Chief Complaint   Patient presents with    Elevated Blood Pressure   Fever  Shortness of air    Subjective    Patient is a 38 y.o. female  4 days postpartum presented to L&D overnight with elevated blood pressure, subjective fever, and shortness of air.  She was found to have abnormal PEP consistent with preeclampsia along with severe range BP.  She was found to have pulmonary edema as well.     Her prenatal care is complicated by  hypertension  gestational hypertension.  She delivered vaginally and peripartum course complicated by chorioamnionitis. She was treated with IV abx x 24 hours without fever prior to DC. Her previous obstetric/gynecological history is noted for is remarkable for severe preeclampsia with prior delivery.     The following portions of the patients history were reviewed and updated as appropriate: current medications, allergies, past medical history, past surgical history, past family history, past social history, and problem list .       Prenatal Information:   Maternal Prenatal Labs  Blood Type ABO Type   Date Value Ref Range Status   2023 O   Final      Rh Status RH type   Date Value Ref Range Status   06/06/2023 Positive  Final      Antibody Screen Antibody Screen   Date Value Ref Range Status   06/06/2023 Negative  Final      Gonnorhea No results found for: GCCX   Chlamydia No results found for: CLAMYDCU   RPR No results found for: RPR   Syphilis Antibody No results found for: SYPHILIS   Rubella No results found for: RUBELLAIGGIN   Hepatitis B Surface Antigen No results found for: HEPBSAG   HIV-1 Antibody No results found for: LABHIV1   Hepatitis C Antibody No results found for: HEPCAB   Rapid Urin Drug Screen No results found for: AMPMETHU, BARBITSCNUR, LABBENZSCN, LABMETHSCN, LABOPIASCN, THCURSCR, COCAINEUR, AMPHETSCREEN, PROPOXSCN, BUPRENORSCNU, METAMPSCNUR, OXYCODONESCN, TRICYCLICSCN   Group B Strep Culture No results found for: GBSANTIGEN           External Prenatal Results       Pregnancy Outside Results - Transcribed From Office Records - See Scanned Records For Details       Test Value Date Time    ABO  O  06/06/23 0305    Rh  Positive  06/06/23 0305    Antibody Screen  Negative  06/06/23 0305       Negative  06/01/23 1648    Varicella IgG       Rubella       Hgb  8.8 g/dL 06/06/23 0306       8.0 g/dL 06/03/23 0725       9.5 g/dL 06/01/23 1648       9.3 g/dL 05/30/23 1909       10.5 g/dL 05/10/23 1147      ^ 10.7 g/dL 04/17/23 1442      ^ 11.2 g/dL 03/20/23 0848      ^ 12.2 g/dL 11/14/22 1713    Hct  26.8 % 06/06/23 0306       23.7 % 06/03/23 0725       28.0 % 06/01/23 1648       27.7 % 05/30/23 1909       30.7 % 05/10/23 1147      ^ 31.8 % 04/17/23 1442      ^ 34.0 % 03/20/23 0848      ^ 36.0 % 11/14/22 1713    Glucose Fasting GTT       Glucose Tolerance Test 1 hour       Glucose Tolerance Test 3 hour       Gonorrhea (discrete)  Negative  12/01/15 1034    Chlamydia (discrete)       RPR       VDRL       Syphilis Antibody       HBsAg ^ Negative  11/14/22 1713    Herpes Simplex Virus PCR       Herpes Simplex VIrus Culture       HIV ^ Nonreactive   22 1713    Hep C RNA Quant PCR       Hep C Antibody       AFP       Group B Strep  No Group B Streptococcus isolated  23 1816    GBS Susceptibility to Clindamycin       GBS Susceptibility to Erythromycin       Fetal Fibronectin       Genetic Testing, Maternal Blood                 Drug Screening       Test Value Date Time    Urine Drug Screen       Amphetamine Screen  Negative ng/mL 23 1100    Barbiturate Screen  Negative ng/mL 23 1100    Benzodiazepine Screen  Negative ng/mL 23 1100    Methadone Screen  Negative ng/mL 23 1100    Phencyclidine Screen  Negative ng/mL 23 1100    Opiates Screen       THC Screen       Cocaine Screen       Propoxyphene Screen  Negative ng/mL 23 1100    Buprenorphine Screen  Negative ng/mL 12/01/15 1034    Methamphetamine Screen       Oxycodone Screen  Negative ng/mL 12/01/15 1034    Tricyclic Antidepressants Screen                 Legend    ^: Historical                              Past OB History:       OB History    Para Term  AB Living   5 4 4 0 1 4   SAB IAB Ectopic Molar Multiple Live Births   1 0 0 0 0 4      # Outcome Date GA Lbr Noah/2nd Weight Sex Delivery Anes PTL Lv   5 Term 23 38w4d 15:53 / 00:17 3790 g (8 lb 5.7 oz) M Vag-Spont EPI N JONN      Name: MAAME LUNA      Apgar1: 8  Apgar5: 9   4 Term 16 37w4d  3260 g (7 lb 3 oz) M Vag-Spont EPI  JONN      Complications: Pregnancy-induced hypertension   3 Term 04/04/15 38w2d  3459 g (7 lb 10 oz) F Vag-Spont EPI  JONN      Complications: Pregnancy-induced hypertension   2 SAB            1 Term 07/08/10 39w0d  2977 g (6 lb 9 oz) M Vag-Spont EPI  JONN      Complications: Pregnancy-induced hypertension       Past Medical History: Past Medical History:   Diagnosis Date    Anemia 2022    Anxiety     Chronic hypertension     Gestational diabetes     Gestational hypertension     History of pre-eclampsia 2015    History of recurrent UTIs      Pulmonary edema 02/2015      Past Surgical History Past Surgical History:   Procedure Laterality Date    NO PAST SURGERIES        Family History: Family History   Problem Relation Age of Onset    Diabetes type II Father     Hypertension Father     Hypertension Mother     Other Son         bifid uvula and polydactyly    Breast cancer Neg Hx     Colon cancer Neg Hx     Ovarian cancer Neg Hx       Social History:  reports that she has never smoked. She does not have any smokeless tobacco history on file.   reports that she does not currently use alcohol after a past usage of about 2.0 standard drinks per week.   reports no history of drug use.                   General ROS Negative Findings:Headaches, Visual Changes, Epigastric pain, Anorexia, Nausia/Vomiting, ROM, and Vaginal Bleeding    Review of Systems   Constitutional: Positive for chills, fever and malaise/fatigue.   HENT: Negative.     Cardiovascular:  Positive for dyspnea on exertion. Negative for chest pain, irregular heartbeat, near-syncope, palpitations and syncope.   Respiratory:  Positive for shortness of breath. Negative for cough and hemoptysis.    Endocrine: Negative.    Hematologic/Lymphatic: Negative.    Skin: Negative.    Musculoskeletal: Negative.    Gastrointestinal: Negative.    Genitourinary: Negative.    Neurological: Negative.    Psychiatric/Behavioral: Negative.        All other systems have been reviewed and are neg  Objective      Vital Signs Range for the last 24 hours  Temperature: Temp:  [98.9 °F (37.2 °C)-99.8 °F (37.7 °C)] 99.8 °F (37.7 °C)   Temp Source: Temp src: Oral   BP: BP: (144-154)/() 146/83   Pulse: Heart Rate:  [] 101   Respirations: Resp:  [16-20] 18   SPO2: SpO2:  [92 %-96 %] 93 %   O2 Amount (l/min):     O2 Devices Device (Oxygen Therapy): room air   Weight: Weight:  [92 kg (202 lb 12.8 oz)-97.5 kg (215 lb)] 92 kg (202 lb 12.8 oz)     Physical Examination:   General:   alert, appears stated age, cooperative, and  moderate distress   Skin:   normal   HEENT:     Lungs:   diminished breath sounds bilaterally   Heart:   regular rate and rhythm, S1, S2 normal, no murmur, click, rub or gallop   Abdomen:  soft, non-tender; bowel sounds normal; no masses,  no organomegaly   Lower Extremities    Pelvis:  Exam deferred.         Laboratory Results:   Lab Results (last 24 hours)       Procedure Component Value Units Date/Time    Blood Gas, Arterial With Co-Ox [[id number]]  (Abnormal) Collected: 06/06/23 0553    Specimen: Arterial Blood Updated: 06/06/23 0553     Site Right Radial     Sekou's Test N/A     pH, Arterial 7.491 pH units      Comment: 83 Value above reference range        pCO2, Arterial 32.5 mm Hg      Comment: 84 Value below reference range        pO2, Arterial 66.4 mm Hg      Comment: 84 Value below reference range        HCO3, Arterial 24.8 mmol/L      Base Excess, Arterial 1.7 mmol/L      Hemoglobin, Blood Gas 9.1 g/dL      Comment: 84 Value below reference range        Hematocrit, Blood Gas 28.0 %      Oxyhemoglobin 91.7 %      Comment: 84 Value below reference range        Methemoglobin 0.50 %      Carboxyhemoglobin 1.4 %      CO2 Content 25.8 mmol/L      Temperature 37.0 C      Barometric Pressure for Blood Gas --     Comment: N/A        Modality Room Air     FIO2 21 %      Rate 0 Breaths/minute      PIP 0 cmH2O      Comment: Meter: [id number]     :  [id number]        IPAP 0     EPAP 0     Note --     pH, Temp Corrected 7.491 pH Units      pCO2, Temperature Corrected 32.5 mm Hg      pO2, Temperature Corrected 66.4 mm Hg     Comprehensive Metabolic Panel [[id number]]  (Abnormal) Collected: 06/06/23 0300    Specimen: Blood Updated: 06/06/23 0422     Glucose 110 mg/dL      BUN 14 mg/dL      Creatinine 0.63 mg/dL      Sodium 139 mmol/L      Potassium 4.2 mmol/L      Chloride 100 mmol/L      CO2 24.0 mmol/L      Calcium 8.9 mg/dL      Total Protein 6.2 g/dL      Albumin 3.2 g/dL      ALT (SGPT) 52 U/L      AST  (SGOT) 51 U/L      Alkaline Phosphatase 115 U/L      Total Bilirubin 0.3 mg/dL      Globulin 3.0 gm/dL      Comment: Calculated Result        A/G Ratio 1.1 g/dL      BUN/Creatinine Ratio 22.2     Anion Gap 15.0 mmol/L      eGFR 117.3 mL/min/1.73     Narrative:      GFR Normal >60  Chronic Kidney Disease <60  Kidney Failure <15      Lactate Dehydrogenase [[id number]]  (Abnormal) Collected: 06/06/23 0300    Specimen: Blood Updated: 06/06/23 0422      U/L     Uric Acid [[id number]]  (Normal) Collected: 06/06/23 0300    Specimen: Blood Updated: 06/06/23 0422     Uric Acid 5.7 mg/dL      Comment: Falsely depressed results may occur on samples drawn from patients receiving N-Acetylcysteine (NAC) or Metamizole.       BNP [[id number]]  (Abnormal) Collected: 06/06/23 0300    Specimen: Blood Updated: 06/06/23 0414     proBNP 1,003.0 pg/mL     Narrative:      Among patients with dyspnea, NT-proBNP is highly sensitive for the detection of acute congestive heart failure. In addition NT-proBNP of <300 pg/ml effectively rules out acute congestive heart failure with 99% negative predictive value.      CBC (No Diff) [[id number]]  (Abnormal) Collected: 06/06/23 0306    Specimen: Blood Updated: 06/06/23 0334     WBC 14.54 10*3/mm3      RBC 2.66 10*6/mm3      Hemoglobin 8.8 g/dL      Hematocrit 26.8 %      .8 fL      MCH 33.1 pg      MCHC 32.8 g/dL      RDW 13.5 %      RDW-SD 48.1 fl      MPV 10.9 fL      Platelets 272 10*3/mm3           Radiology Review:   Imaging Results (Last 24 Hours)       Procedure Component Value Units Date/Time    CT Angiogram Chest [[id number]] Collected: 06/06/23 0416     Updated: 06/06/23 0619    Narrative:      Exam: CT Angiography of the Chest.    Date: 6/6/2023.     Comparison: None    History: 4 days postpartum with productive cough and shortness of breath.    Technique: CT examination of the chest was performed following the intravenous administration of 80 mL of Isovue-370. Sagittal, coronal  and 3-D reformatted images were provided. CT dose lowering techniques were used, to include: automated exposure   control, adjustment for patient size, and/or use of iterative reconstruction.    FINDINGS:    Mediastinum and [place]: There is no axillary, mediastinal or hilar lymphadenopathy.    Pleural and Pericardial spaces: There are small-to-moderate bilateral pleural effusions.    Upper Abdomen: The visualized upper abdomen is unremarkable.    Cardiovascular: The thoracic aorta is normal in size without evidence of aneurysm or dissection. The heart is mildly enlarged.    Pulmonary Artery: There are no filling defects in the pulmonary arteries.    Lung Parenchyma and Airways: There is scattered septal thickening seen diffusely throughout the lungs bilaterally which is likely related to pulmonary edema.    Bones: No fracture or aggressive osseous lesion.      Impression:        1. No evidence of pulmonary embolism.  2. No evidence of thoracic aortic aneurysm or dissection.  3. Mild cardiomegaly with mild pulmonary edema and small to moderate bilateral pleural effusions.      Electronically signed by:  Louis Cervantes D.O.    2023 4:18 AM Mountain Time          Other Studies:    Assessment & Plan      Pulmonary edema    Endometritis        Assessment:  1.  38 year old multiparous female PPD number 4 s/p   2.  Severe preeclampsia  3. Endometritis  4.  Pulmonary edema    Plan:  1. Patient admitted  2. Severe preeclampsia- Start magnesium sulfate for seizure prophylaxis. Nifedipine XL 30 mg daily for severe range BP. Will monitor labs.  3. Pulmonary edema- ECHO in progress now. Cardiology consulted.  Responding well to dose of lasix this morning.   4. Will start back on ampicillin and gentamicin due to likely endometritis. If fever increases or no response to Abx will proceed with TVUS.     Karlos Dias MD  2023  08:32 EDT    Electronically signed by Karlos Dias MD at 23 0838        Skyler Sapp III, MD at 23 0327          [facility]  Obstetric History and Physical    Chief Complaint   Patient presents with    Elevated Blood Pressure       Subjective     Patient is a 38 y.o. female  currently at 38w4d, who presents with complaints of acute shortness of breath which became noticeably worse beginning at 0100.  She describes bilateral chest pain below the costal margin.  She denies fever/chills, cough, headache, scotomata or epigastric pain.  She reports she had a history of prior pulmonary edema following prior vaginal deliveries.  She is now postpartum day #4 status post vaginal delivery    Her prenatal care was reportedly benign. Her previous obstetric/gynecological history is notable for a history of 4 prior vaginal deliveries and 1 spontaneous .  She reports prior pulmonary edema treated with Lasix following a prior delivery.    The following portions of the patients history were reviewed and updated as appropriate: current medications, allergies, past medical history, past surgical history, past family history, past social history, and problem list .        Prenatal Information:   Maternal Prenatal Labs  Blood Type No results found for: ABO   Rh Status No results found for: RH   Antibody Screen No results found for: ABSCRN   Gonnorhea No results found for: GCCX   Chlamydia No results found for: CLAMYDCU   RPR No results found for: RPR   Syphilis Antibody No results found for: SYPHILIS   Rubella No results found for: RUBELLAIGGIN   Hepatitis B Surface Antigen No results found for: HEPBSAG   HIV-1 Antibody No results found for: LABHIV1   Hepatitis C Antibody No results found for: HEPCAB   Rapid Urin Drug Screen No results found for: AMPMETHU, BARBITSCNUR, LABBENZSCN, LABMETHSCN, LABOPIASCN, THCURSCR, COCAINEUR, AMPHETSCREEN, PROPOXSCN, BUPRENORSCNU, METAMPSCNUR, OXYCODONESCN, TRICYCLICSCN   Group B Strep Culture No results found for: GBSANTIGEN            External Prenatal Results       Pregnancy Outside Results - Transcribed From Office Records - See Scanned Records For Details       Test Value Date Time    ABO  O  06/01/23 1648    Rh  Positive  06/01/23 1648    Antibody Screen  Negative  06/01/23 1648    Varicella IgG       Rubella       Hgb  8.0 g/dL 06/03/23 0725       9.5 g/dL 06/01/23 1648       9.3 g/dL 05/30/23 1909       10.5 g/dL 05/10/23 1147      ^ 10.7 g/dL 04/17/23 1442      ^ 11.2 g/dL 03/20/23 0848      ^ 12.2 g/dL 11/14/22 1713    Hct  23.7 % 06/03/23 0725       28.0 % 06/01/23 1648       27.7 % 05/30/23 1909       30.7 % 05/10/23 1147      ^ 31.8 % 04/17/23 1442      ^ 34.0 % 03/20/23 0848      ^ 36.0 % 11/14/22 1713    Glucose Fasting GTT       Glucose Tolerance Test 1 hour       Glucose Tolerance Test 3 hour       Gonorrhea (discrete)  Negative  12/01/15 1034    Chlamydia (discrete)       RPR       VDRL       Syphilis Antibody       HBsAg ^ Negative  11/14/22 1713    Herpes Simplex Virus PCR       Herpes Simplex VIrus Culture       HIV ^ Nonreactive  11/14/22 1713    Hep C RNA Quant PCR       Hep C Antibody       AFP       Group B Strep  No Group B Streptococcus isolated  05/18/23 1816    GBS Susceptibility to Clindamycin       GBS Susceptibility to Erythromycin       Fetal Fibronectin       Genetic Testing, Maternal Blood                 Drug Screening       Test Value Date Time    Urine Drug Screen       Amphetamine Screen  Negative ng/mL 05/22/23 1100    Barbiturate Screen  Negative ng/mL 05/22/23 1100    Benzodiazepine Screen  Negative ng/mL 05/22/23 1100    Methadone Screen  Negative ng/mL 05/22/23 1100    Phencyclidine Screen  Negative ng/mL 05/22/23 1100    Opiates Screen       THC Screen       Cocaine Screen       Propoxyphene Screen  Negative ng/mL 05/22/23 1100    Buprenorphine Screen  Negative ng/mL 12/01/15 1034    Methamphetamine Screen       Oxycodone Screen  Negative ng/mL 12/01/15 1034    Tricyclic Antidepressants Screen                  Legend    ^: Historical                              Past OB History:       OB History    Para Term  AB Living   5 4 4 0 1 4   SAB IAB Ectopic Molar Multiple Live Births   1 0 0 0 0 4      # Outcome Date GA Lbr Noah/2nd Weight Sex Delivery Anes PTL Lv   5 Term 23 38w4d 15:53 / 00:17 3790 g (8 lb 5.7 oz) M Vag-Spont EPI N JONN      Name: MAAME LUNA      Apgar1: 8  Apgar5: 9   4 Term 16 37w4d  3260 g (7 lb 3 oz) M Vag-Spont EPI  JONN      Complications: Pregnancy-induced hypertension   3 Term 04/04/15 38w2d  3459 g (7 lb 10 oz) F Vag-Spont EPI  JONN      Complications: Pregnancy-induced hypertension   2 SAB            1 Term 07/08/10 39w0d  2977 g (6 lb 9 oz) M Vag-Spont EPI  JONN      Complications: Pregnancy-induced hypertension       Past Medical History:  Past Medical History:   Diagnosis Date    Anemia 2022    Anxiety     Chronic hypertension     Gestational diabetes     Gestational hypertension     History of pre-eclampsia 2015    History of recurrent UTIs     Pulmonary edema 2015      Past Surgical History Past Surgical History:   Procedure Laterality Date    NO PAST SURGERIES        Family History: Family History   Problem Relation Age of Onset    Diabetes type II Father     Hypertension Father     Hypertension Mother     Other Son         bifid uvula and polydactyly    Breast cancer Neg Hx     Colon cancer Neg Hx     Ovarian cancer Neg Hx       Social History:  reports that she has never smoked. She does not have any smokeless tobacco history on file.   reports that she does not currently use alcohol after a past usage of about 2.0 standard drinks per week.   reports no history of drug use.   Allergies: Patient has no known allergies.  Current Medications:          No current facility-administered medications on file prior to encounter.     Current Outpatient Medications on File Prior to Encounter   Medication Sig Dispense Refill    Blood Glucose Monitoring  Suppl (OneTouch Verio Flex System) w/Device kit       docusate sodium (COLACE) 100 MG capsule Take 1 capsule by mouth 2 (Two) Times a Day. 60 capsule 1    ferrous sulfate 325 (65 FE) MG tablet Take 1 tablet by mouth Daily With Breakfast. 60 tablet 1    fish oil-omega-3 fatty acids 1000 MG capsule Take  by mouth.      ibuprofen (ADVIL,MOTRIN) 600 MG tablet Take 1 tablet by mouth Every 6 (Six) Hours As Needed for Mild Pain (First Line: Mild pain.). 60 tablet 1    Lancets (OneTouch Delica Plus [id number]) misc       Prenatal Vit-Fe Fumarate-FA (Prenatal Vitamin) 27-0.8 MG tablet Take 1 tablet by mouth Daily. 30 tablet 11       General ROS: Pertinent items are noted in HPI, all other systems reviewed and negative    Objective       Vital Signs Range for the last 24 hours  Temperature: Temp:  [98.9 °F (37.2 °C)] 98.9 °F (37.2 °C)   Temp Source: Temp src: Oral   BP: BP: (150-154)/(104-122) 153/104   Pulse: Heart Rate:  [] 105   Respirations: Resp:  [20] 20   SPO2: SpO2:  [94 %-96 %] 94 %   O2 Amount (l/min):     O2 Devices     Weight: Weight:  [97.5 kg (215 lb)] 97.5 kg (215 lb)     Physical Examination: General appearance - oriented to person, place, and time, anxious, and in mild to moderate distress  Mental status - alert, oriented to person, place, and time, anxious, agitated  Eyes - pupils equal and reactive, extraocular eye movements intact, sclera anicteric  Neck - supple, no significant adenopathy, no JVD noted.  Chest - clear to auscultation, no wheezes, rales or rhonchi, symmetric air entry  Heart - normal rate, regular rhythm, normal S1, S2, no murmurs, rubs, clicks or gallops  Abdomen-soft, nontender, nondistended, no masses or organomegaly  Neurological - alert, oriented, normal speech, no focal findings or movement disorder noted  Extremities - pedal edema 3+    Laboratory Results:   Lab Results   Component Value Date    ALKPHOS 128 (H) 06/01/2023    ALT 34 (H) 06/01/2023    AST 28 06/01/2023     CREATININE 0.53 (L) 06/01/2023    BILITOT 0.7 06/01/2023     06/01/2023    URICACID 5.7 06/01/2023       WBC   Date Value Ref Range Status   06/03/2023 13.25 (H) 3.40 - 10.80 10*3/mm3 Final     RBC   Date Value Ref Range Status   06/03/2023 2.44 (L) 3.77 - 5.28 10*6/mm3 Final     Hemoglobin   Date Value Ref Range Status   06/03/2023 8.0 (L) 12.0 - 15.9 g/dL Final     Hematocrit   Date Value Ref Range Status   06/03/2023 23.7 (L) 34.0 - 46.6 % Final     MCV   Date Value Ref Range Status   06/03/2023 97.1 (H) 79.0 - 97.0 fL Final     MCH   Date Value Ref Range Status   06/03/2023 32.8 26.6 - 33.0 pg Final     MCHC   Date Value Ref Range Status   06/03/2023 33.8 31.5 - 35.7 g/dL Final     RDW   Date Value Ref Range Status   06/03/2023 13.0 12.3 - 15.4 % Final     RDW-SD   Date Value Ref Range Status   06/03/2023 45.1 37.0 - 54.0 fl Final     MPV   Date Value Ref Range Status   06/03/2023 11.2 6.0 - 12.0 fL Final     Platelets   Date Value Ref Range Status   06/03/2023 167 140 - 450 10*3/mm3 Final     Neutrophil %   Date Value Ref Range Status   06/03/2023 79.2 (H) 42.7 - 76.0 % Final     Lymphocyte %   Date Value Ref Range Status   06/03/2023 13.9 (L) 19.6 - 45.3 % Final     Monocyte %   Date Value Ref Range Status   06/03/2023 5.3 5.0 - 12.0 % Final     Eosinophil %   Date Value Ref Range Status   06/03/2023 0.1 (L) 0.3 - 6.2 % Final     Basophil %   Date Value Ref Range Status   06/03/2023 0.2 0.0 - 1.5 % Final     Immature Grans %   Date Value Ref Range Status   06/03/2023 1.3 (H) 0.0 - 0.5 % Final     Neutrophils, Absolute   Date Value Ref Range Status   06/03/2023 10.51 (H) 1.70 - 7.00 10*3/mm3 Final     Lymphocytes, Absolute   Date Value Ref Range Status   06/03/2023 1.84 0.70 - 3.10 10*3/mm3 Final     Monocytes, Absolute   Date Value Ref Range Status   06/03/2023 0.70 0.10 - 0.90 10*3/mm3 Final     Eosinophils, Absolute   Date Value Ref Range Status   06/03/2023 0.01 0.00 - 0.40 10*3/mm3 Final  "    Basophils, Absolute   Date Value Ref Range Status   06/03/2023 0.02 0.00 - 0.20 10*3/mm3 Final     Immature Grans, Absolute   Date Value Ref Range Status   06/03/2023 0.17 (H) 0.00 - 0.05 10*3/mm3 Final     nRBC   Date Value Ref Range Status   06/03/2023 0.2 0.0 - 0.2 /100 WBC Final       Results from last 7 days   Lab Units 06/01/23  1648   ABO TYPING  O   RH TYPING  Positive   ANTIBODY SCREEN  Negative       Brief Urine Lab Results  (Last result in the past 365 days)        Color   Clarity   Blood   Leuk Est   Nitrite   Protein   CREAT   Urine HCG        05/30/23 1630           Negative                     Radiology Review: CTA confirms pleural effusions bilaterally with mild pulmonary edema.  No evidence of pulmonary embolism.  Mild cardiomegaly noted.  Other Studies: Significantly abnormal ABG with pH = 7.491, PCO2 = 32.5, and PO2 = 66.4.  H/H = 8.8/26.8%.  WBC = 14.54.  Platelet = 272,000  AST = 51, ALT = 52  proBNP elevated = 1003.0    Assessment & Plan       * No active hospital problems. *        Assessment:  Postpartum pulmonary edema with significantly elevated BNP.  Hypoxemia based on ABG.  Postpartum hypertension.  Mild elevation of liver transaminases but not 2 times normal.  Platelet count is normal.  Given pulmonary edema with hypertension, must consider preeclampsia.      Plan:  Admit.  Echocardiogram and cardiology consult.  Diuresis with Lasix.  Magnesium sulfate for seizure prophylaxis.     Total time spent today with Karen  was 40-54 minutes (level 5).  Of this time, > 50% was spent face-to-face time coordinating care, answering her questions and counseling regarding pathophysiology of her presenting problem along with plans for any diagnostic work-up and treatment.        Skyler Childers \"Lala\" FLORENTINO Sapp MD  6/6/2023  03:27 EDT      Electronically signed by Skyler Sapp III, MD at 06/06/23 0718       Vital Signs (last 2 days)       Date/Time Temp Temp src Pulse Resp BP SpO2    06/06/23 0807 " 99.8 (37.7) -- -- -- -- --    06/06/23 0805 -- -- 101 18 146/83 --    06/06/23 0803 -- -- -- -- -- 93    06/06/23 0754 -- -- 103 20 144/87 --    06/06/23 0701 -- -- 102 16 145/82 --    06/06/23 0553 -- -- 111 18 145/89 --    06/06/23 0515 -- -- 111 18 153/90 92    Comment rows:    OBSERV: Pt back to room from CT at this time,. at 06/06/23 0515    06/06/23 0444 -- -- -- -- -- --    Comment rows:    OBSERV: Pt transported to CT via wheelchair per this nurse at this time, at 06/06/23 0444    06/06/23 0310 -- -- 105 20 153/104 94    06/06/23 0300 -- -- 98 -- 154/122 96    06/06/23 0257 98.9 (37.2) Oral -- -- -- --    06/06/23 0254 -- -- 104 -- 150/106 96          Oxygen Therapy (last 2 days)       Date/Time SpO2 Device (Oxygen Therapy) Flow (L/min) Oxygen Concentration (%) ETCO2 (mmHg)    06/06/23 0803 93 -- -- -- --    06/06/23 0800 -- room air -- -- --    06/06/23 0515 92 -- -- -- --    06/06/23 0310 94 room air -- -- --    06/06/23 0300 96 -- -- -- --    06/06/23 0254 96 -- -- -- --          Facility-Administered Medications as of 6/6/2023   Medication Dose Route Frequency Provider Last Rate Last Admin    ampicillin 2 g/100 mL 0.9% NS (MBP)  2 g Intravenous Q6H Karlos Dias MD        dextrose (D5W) 5 % infusion  75 mL/hr Intravenous Continuous Skyler Sapp III, MD 75 mL/hr at 06/06/23 0633 75 mL/hr at 06/06/23 0633    [COMPLETED] furosemide (LASIX) injection 40 mg  40 mg Intravenous Once Skyler Sapp III, MD   40 mg at 06/06/23 0630    gentamicin (GARAMYCIN) 350 mg in sodium chloride 0.9 % IVPB  5 mg/kg (Adjusted) Intravenous Q24H Karlos Dias MD        hydrOXYzine (ATARAX) tablet 50 mg  50 mg Oral Nightly PRN Skyler Sapp III, MD        ibuprofen (ADVIL,MOTRIN) tablet 600 mg  600 mg Oral Q6H PRN Skyler Sapp III, MD   600 mg at 06/06/23 0917    [COMPLETED] iopamidol (ISOVUE-370) 76 % injection 100 mL  100 mL Intravenous Once in imaging Karlos Dias MD   100 mL at  06/06/23 0552    lidocaine PF 1% (XYLOCAINE) injection 5 mL  5 mL Intradermal PRN Skyler Sapp III, MD        magnesium sulfate 20 GM/500ML infusion  1 g/hr Intravenous Continuous Skyler Sapp III, MD        [COMPLETED] magnesium sulfate bolus from bag 0.04 g/mL solution 4 g  4 g Intravenous Once Skyler Sapp III,  mL/hr at 06/06/23 0756 4 g at 06/06/23 0756    [COMPLETED] NIFEdipine (PROCARDIA) capsule 10 mg  10 mg Oral Once Skyler Sapp III, MD   10 mg at 06/06/23 0338    NIFEdipine XL (PROCARDIA XL) 24 hr tablet 30 mg  30 mg Oral Q12H Skyler Sapp III, MD   30 mg at 06/06/23 0917    sodium chloride 0.9 % flush 1-10 mL  1-10 mL Intravenous PRN Skyler Sapp III, MD        sodium chloride 0.9 % flush 10 mL  10 mL Intravenous Q8H Skyler Sapp III, MD        sodium chloride 0.9 % flush 10 mL  10 mL Intravenous PRN Skyler Sapp III, MD        sodium chloride 0.9 % flush 10 mL  10 mL Intravenous Q12H Skyler Sapp III, MD         Physician Progress Notes (last 48 hours)  Notes from 06/04/23 0932 through 06/06/23 0932   No notes of this type exist for this encounter.       Consult Notes (last 48 hours)  Notes from 06/04/23 0932 through 06/06/23 0932   No notes of this type exist for this encounter.       Respiratory Therapy Notes (last 48 hours)  Notes from 06/04/23 0932 through 06/06/23 0932   No notes exist for this encounter.

## 2023-06-06 NOTE — H&P
[facility]  Obstetric History and Physical    Chief Complaint   Patient presents with    Elevated Blood Pressure       Subjective     Patient is a 38 y.o. female  currently at 38w4d, who presents with complaints of acute shortness of breath which became noticeably worse beginning at 0100.  She describes bilateral chest pain below the costal margin.  She denies fever/chills, cough, headache, scotomata or epigastric pain.  She reports she had a history of prior pulmonary edema following prior vaginal deliveries.  She is now postpartum day #4 status post vaginal delivery    Her prenatal care was reportedly benign. Her previous obstetric/gynecological history is notable for a history of 4 prior vaginal deliveries and 1 spontaneous .  She reports prior pulmonary edema treated with Lasix following a prior delivery.    The following portions of the patients history were reviewed and updated as appropriate: current medications, allergies, past medical history, past surgical history, past family history, past social history, and problem list .        Prenatal Information:   Maternal Prenatal Labs  Blood Type No results found for: ABO   Rh Status No results found for: RH   Antibody Screen No results found for: ABSCRN   Gonnorhea No results found for: GCCX   Chlamydia No results found for: CLAMYDCU   RPR No results found for: RPR   Syphilis Antibody No results found for: SYPHILIS   Rubella No results found for: RUBELLAIGGIN   Hepatitis B Surface Antigen No results found for: HEPBSAG   HIV-1 Antibody No results found for: LABHIV1   Hepatitis C Antibody No results found for: HEPCAB   Rapid Urin Drug Screen No results found for: AMPMETHU, BARBITSCNUR, LABBENZSCN, LABMETHSCN, LABOPIASCN, THCURSCR, COCAINEUR, AMPHETSCREEN, PROPOXSCN, BUPRENORSCNU, METAMPSCNUR, OXYCODONESCN, TRICYCLICSCN   Group B Strep Culture No results found for: GBSANTIGEN           External Prenatal Results       Pregnancy Outside Results -  Transcribed From Office Records - See Scanned Records For Details       Test Value Date Time    ABO  O  06/01/23 1648    Rh  Positive  06/01/23 1648    Antibody Screen  Negative  06/01/23 1648    Varicella IgG       Rubella       Hgb  8.0 g/dL 06/03/23 0725       9.5 g/dL 06/01/23 1648       9.3 g/dL 05/30/23 1909       10.5 g/dL 05/10/23 1147      ^ 10.7 g/dL 04/17/23 1442      ^ 11.2 g/dL 03/20/23 0848      ^ 12.2 g/dL 11/14/22 1713    Hct  23.7 % 06/03/23 0725       28.0 % 06/01/23 1648       27.7 % 05/30/23 1909       30.7 % 05/10/23 1147      ^ 31.8 % 04/17/23 1442      ^ 34.0 % 03/20/23 0848      ^ 36.0 % 11/14/22 1713    Glucose Fasting GTT       Glucose Tolerance Test 1 hour       Glucose Tolerance Test 3 hour       Gonorrhea (discrete)  Negative  12/01/15 1034    Chlamydia (discrete)       RPR       VDRL       Syphilis Antibody       HBsAg ^ Negative  11/14/22 1713    Herpes Simplex Virus PCR       Herpes Simplex VIrus Culture       HIV ^ Nonreactive  11/14/22 1713    Hep C RNA Quant PCR       Hep C Antibody       AFP       Group B Strep  No Group B Streptococcus isolated  05/18/23 1816    GBS Susceptibility to Clindamycin       GBS Susceptibility to Erythromycin       Fetal Fibronectin       Genetic Testing, Maternal Blood                 Drug Screening       Test Value Date Time    Urine Drug Screen       Amphetamine Screen  Negative ng/mL 05/22/23 1100    Barbiturate Screen  Negative ng/mL 05/22/23 1100    Benzodiazepine Screen  Negative ng/mL 05/22/23 1100    Methadone Screen  Negative ng/mL 05/22/23 1100    Phencyclidine Screen  Negative ng/mL 05/22/23 1100    Opiates Screen       THC Screen       Cocaine Screen       Propoxyphene Screen  Negative ng/mL 05/22/23 1100    Buprenorphine Screen  Negative ng/mL 12/01/15 1034    Methamphetamine Screen       Oxycodone Screen  Negative ng/mL 12/01/15 1034    Tricyclic Antidepressants Screen                 Legend    ^: Historical                               Past OB History:       OB History    Para Term  AB Living   5 4 4 0 1 4   SAB IAB Ectopic Molar Multiple Live Births   1 0 0 0 0 4      # Outcome Date GA Lbr Noah/2nd Weight Sex Delivery Anes PTL Lv   5 Term 23 38w4d 15:53 / 00:17 3790 g (8 lb 5.7 oz) M Vag-Spont EPI N JONN      Name: MAAME LUNA      Apgar1: 8  Apgar5: 9   4 Term 16 37w4d  3260 g (7 lb 3 oz) M Vag-Spont EPI  JONN      Complications: Pregnancy-induced hypertension   3 Term 04/04/15 38w2d  3459 g (7 lb 10 oz) F Vag-Spont EPI  JONN      Complications: Pregnancy-induced hypertension   2 SAB            1 Term 07/08/10 39w0d  2977 g (6 lb 9 oz) M Vag-Spont EPI  JONN      Complications: Pregnancy-induced hypertension       Past Medical History:  Past Medical History:   Diagnosis Date    Anemia 2022    Anxiety     Chronic hypertension     Gestational diabetes     Gestational hypertension     History of pre-eclampsia 2015    History of recurrent UTIs     Pulmonary edema 2015      Past Surgical History Past Surgical History:   Procedure Laterality Date    NO PAST SURGERIES        Family History: Family History   Problem Relation Age of Onset    Diabetes type II Father     Hypertension Father     Hypertension Mother     Other Son         bifid uvula and polydactyly    Breast cancer Neg Hx     Colon cancer Neg Hx     Ovarian cancer Neg Hx       Social History:  reports that she has never smoked. She does not have any smokeless tobacco history on file.   reports that she does not currently use alcohol after a past usage of about 2.0 standard drinks per week.   reports no history of drug use.   Allergies: Patient has no known allergies.  Current Medications:          No current facility-administered medications on file prior to encounter.     Current Outpatient Medications on File Prior to Encounter   Medication Sig Dispense Refill    Blood Glucose Monitoring Suppl (OneTouch Verio Flex System) w/Device kit        docusate sodium (COLACE) 100 MG capsule Take 1 capsule by mouth 2 (Two) Times a Day. 60 capsule 1    ferrous sulfate 325 (65 FE) MG tablet Take 1 tablet by mouth Daily With Breakfast. 60 tablet 1    fish oil-omega-3 fatty acids 1000 MG capsule Take  by mouth.      ibuprofen (ADVIL,MOTRIN) 600 MG tablet Take 1 tablet by mouth Every 6 (Six) Hours As Needed for Mild Pain (First Line: Mild pain.). 60 tablet 1    Lancets (OneTouch Delica Plus Dvyoil16O) misc       Prenatal Vit-Fe Fumarate-FA (Prenatal Vitamin) 27-0.8 MG tablet Take 1 tablet by mouth Daily. 30 tablet 11       General ROS: Pertinent items are noted in HPI, all other systems reviewed and negative    Objective       Vital Signs Range for the last 24 hours  Temperature: Temp:  [98.9 °F (37.2 °C)] 98.9 °F (37.2 °C)   Temp Source: Temp src: Oral   BP: BP: (150-154)/(104-122) 153/104   Pulse: Heart Rate:  [] 105   Respirations: Resp:  [20] 20   SPO2: SpO2:  [94 %-96 %] 94 %   O2 Amount (l/min):     O2 Devices     Weight: Weight:  [97.5 kg (215 lb)] 97.5 kg (215 lb)     Physical Examination: General appearance - oriented to person, place, and time, anxious, and in mild to moderate distress  Mental status - alert, oriented to person, place, and time, anxious, agitated  Eyes - pupils equal and reactive, extraocular eye movements intact, sclera anicteric  Neck - supple, no significant adenopathy, no JVD noted.  Chest - clear to auscultation, no wheezes, rales or rhonchi, symmetric air entry  Heart - normal rate, regular rhythm, normal S1, S2, no murmurs, rubs, clicks or gallops  Abdomen-soft, nontender, nondistended, no masses or organomegaly  Neurological - alert, oriented, normal speech, no focal findings or movement disorder noted  Extremities - pedal edema 3+    Laboratory Results:   Lab Results   Component Value Date    ALKPHOS 128 (H) 06/01/2023    ALT 34 (H) 06/01/2023    AST 28 06/01/2023    CREATININE 0.53 (L) 06/01/2023    BILITOT 0.7 06/01/2023      06/01/2023    URICACID 5.7 06/01/2023       WBC   Date Value Ref Range Status   06/03/2023 13.25 (H) 3.40 - 10.80 10*3/mm3 Final     RBC   Date Value Ref Range Status   06/03/2023 2.44 (L) 3.77 - 5.28 10*6/mm3 Final     Hemoglobin   Date Value Ref Range Status   06/03/2023 8.0 (L) 12.0 - 15.9 g/dL Final     Hematocrit   Date Value Ref Range Status   06/03/2023 23.7 (L) 34.0 - 46.6 % Final     MCV   Date Value Ref Range Status   06/03/2023 97.1 (H) 79.0 - 97.0 fL Final     MCH   Date Value Ref Range Status   06/03/2023 32.8 26.6 - 33.0 pg Final     MCHC   Date Value Ref Range Status   06/03/2023 33.8 31.5 - 35.7 g/dL Final     RDW   Date Value Ref Range Status   06/03/2023 13.0 12.3 - 15.4 % Final     RDW-SD   Date Value Ref Range Status   06/03/2023 45.1 37.0 - 54.0 fl Final     MPV   Date Value Ref Range Status   06/03/2023 11.2 6.0 - 12.0 fL Final     Platelets   Date Value Ref Range Status   06/03/2023 167 140 - 450 10*3/mm3 Final     Neutrophil %   Date Value Ref Range Status   06/03/2023 79.2 (H) 42.7 - 76.0 % Final     Lymphocyte %   Date Value Ref Range Status   06/03/2023 13.9 (L) 19.6 - 45.3 % Final     Monocyte %   Date Value Ref Range Status   06/03/2023 5.3 5.0 - 12.0 % Final     Eosinophil %   Date Value Ref Range Status   06/03/2023 0.1 (L) 0.3 - 6.2 % Final     Basophil %   Date Value Ref Range Status   06/03/2023 0.2 0.0 - 1.5 % Final     Immature Grans %   Date Value Ref Range Status   06/03/2023 1.3 (H) 0.0 - 0.5 % Final     Neutrophils, Absolute   Date Value Ref Range Status   06/03/2023 10.51 (H) 1.70 - 7.00 10*3/mm3 Final     Lymphocytes, Absolute   Date Value Ref Range Status   06/03/2023 1.84 0.70 - 3.10 10*3/mm3 Final     Monocytes, Absolute   Date Value Ref Range Status   06/03/2023 0.70 0.10 - 0.90 10*3/mm3 Final     Eosinophils, Absolute   Date Value Ref Range Status   06/03/2023 0.01 0.00 - 0.40 10*3/mm3 Final     Basophils, Absolute   Date Value Ref Range Status   06/03/2023 0.02  "0.00 - 0.20 10*3/mm3 Final     Immature Grans, Absolute   Date Value Ref Range Status   06/03/2023 0.17 (H) 0.00 - 0.05 10*3/mm3 Final     nRBC   Date Value Ref Range Status   06/03/2023 0.2 0.0 - 0.2 /100 WBC Final       Results from last 7 days   Lab Units 06/01/23  1648   ABO TYPING  O   RH TYPING  Positive   ANTIBODY SCREEN  Negative       Brief Urine Lab Results  (Last result in the past 365 days)        Color   Clarity   Blood   Leuk Est   Nitrite   Protein   CREAT   Urine HCG        05/30/23 1630           Negative                     Radiology Review: CTA confirms pleural effusions bilaterally with mild pulmonary edema.  No evidence of pulmonary embolism.  Mild cardiomegaly noted.  Other Studies: Significantly abnormal ABG with pH = 7.491, PCO2 = 32.5, and PO2 = 66.4.  H/H = 8.8/26.8%.  WBC = 14.54.  Platelet = 272,000  AST = 51, ALT = 52  proBNP elevated = 1003.0    Assessment & Plan       * No active hospital problems. *        Assessment:  Postpartum pulmonary edema with significantly elevated BNP.  Hypoxemia based on ABG.  Postpartum hypertension.  Mild elevation of liver transaminases but not 2 times normal.  Platelet count is normal.  Given pulmonary edema with hypertension, must consider preeclampsia.      Plan:  Admit.  Echocardiogram and cardiology consult.  Diuresis with Lasix.  Magnesium sulfate for seizure prophylaxis.     Total time spent today with Karen  was 40-54 minutes (level 5).  Of this time, > 50% was spent face-to-face time coordinating care, answering her questions and counseling regarding pathophysiology of her presenting problem along with plans for any diagnostic work-up and treatment.        Skyler Childers \"Lala\" FLORENTINO Sapp MD  6/6/2023  03:27 EDT    "

## 2023-06-07 LAB
ALBUMIN SERPL-MCNC: 3.6 G/DL (ref 3.5–5.2)
ALBUMIN/GLOB SERPL: 1.1 G/DL
ALP SERPL-CCNC: 125 U/L (ref 39–117)
ALP SERPL-CCNC: 128 U/L (ref 39–117)
ALT SERPL W P-5'-P-CCNC: 46 U/L (ref 1–33)
ALT SERPL W P-5'-P-CCNC: 50 U/L (ref 1–33)
ANION GAP SERPL CALCULATED.3IONS-SCNC: 12 MMOL/L (ref 5–15)
AST SERPL-CCNC: 25 U/L (ref 1–32)
AST SERPL-CCNC: 33 U/L (ref 1–32)
BASOPHILS # BLD AUTO: 0.09 10*3/MM3 (ref 0–0.2)
BASOPHILS NFR BLD AUTO: 0.7 % (ref 0–1.5)
BILIRUB SERPL-MCNC: 0.3 MG/DL (ref 0–1.2)
BILIRUB SERPL-MCNC: 0.4 MG/DL (ref 0–1.2)
BUN SERPL-MCNC: 16 MG/DL (ref 6–20)
BUN/CREAT SERPL: 34.8 (ref 7–25)
CALCIUM SPEC-SCNC: 8.9 MG/DL (ref 8.6–10.5)
CHLORIDE SERPL-SCNC: 101 MMOL/L (ref 98–107)
CO2 SERPL-SCNC: 23 MMOL/L (ref 22–29)
CREAT SERPL-MCNC: 0.46 MG/DL (ref 0.57–1)
CREAT SERPL-MCNC: 0.6 MG/DL (ref 0.57–1)
DEPRECATED RDW RBC AUTO: 51.8 FL (ref 37–54)
EGFRCR SERPLBLD CKD-EPI 2021: 126.6 ML/MIN/1.73
EOSINOPHIL # BLD AUTO: 0.18 10*3/MM3 (ref 0–0.4)
EOSINOPHIL NFR BLD AUTO: 1.3 % (ref 0.3–6.2)
ERYTHROCYTE [DISTWIDTH] IN BLOOD BY AUTOMATED COUNT: 14 % (ref 12.3–15.4)
GLOBULIN UR ELPH-MCNC: 3.2 GM/DL
GLUCOSE SERPL-MCNC: 85 MG/DL (ref 65–99)
HCT VFR BLD AUTO: 34 % (ref 34–46.6)
HGB BLD-MCNC: 10.5 G/DL (ref 12–15.9)
IMM GRANULOCYTES # BLD AUTO: 0.56 10*3/MM3 (ref 0–0.05)
IMM GRANULOCYTES NFR BLD AUTO: 4.1 % (ref 0–0.5)
LDH SERPL-CCNC: 261 U/L (ref 135–214)
LYMPHOCYTES # BLD AUTO: 2.65 10*3/MM3 (ref 0.7–3.1)
LYMPHOCYTES NFR BLD AUTO: 19.4 % (ref 19.6–45.3)
MCH RBC QN AUTO: 32 PG (ref 26.6–33)
MCHC RBC AUTO-ENTMCNC: 30.9 G/DL (ref 31.5–35.7)
MCV RBC AUTO: 103.7 FL (ref 79–97)
MONOCYTES # BLD AUTO: 0.75 10*3/MM3 (ref 0.1–0.9)
MONOCYTES NFR BLD AUTO: 5.5 % (ref 5–12)
NEUTROPHILS NFR BLD AUTO: 69 % (ref 42.7–76)
NEUTROPHILS NFR BLD AUTO: 9.42 10*3/MM3 (ref 1.7–7)
NRBC BLD AUTO-RTO: 0.1 /100 WBC (ref 0–0.2)
PLATELET # BLD AUTO: 318 10*3/MM3 (ref 140–450)
PMV BLD AUTO: 9.9 FL (ref 6–12)
POTASSIUM SERPL-SCNC: 4.9 MMOL/L (ref 3.5–5.2)
PROT SERPL-MCNC: 6.8 G/DL (ref 6–8.5)
RBC # BLD AUTO: 3.28 10*6/MM3 (ref 3.77–5.28)
SODIUM SERPL-SCNC: 136 MMOL/L (ref 136–145)
URATE SERPL-MCNC: 4.6 MG/DL (ref 2.4–5.7)
WBC NRBC COR # BLD: 13.65 10*3/MM3 (ref 3.4–10.8)

## 2023-06-07 PROCEDURE — 25010000002 GENTAMICIN PER 80 MG: Performed by: OBSTETRICS & GYNECOLOGY

## 2023-06-07 PROCEDURE — 80053 COMPREHEN METABOLIC PANEL: CPT | Performed by: OBSTETRICS & GYNECOLOGY

## 2023-06-07 PROCEDURE — 99232 SBSQ HOSP IP/OBS MODERATE 35: CPT

## 2023-06-07 PROCEDURE — 84550 ASSAY OF BLOOD/URIC ACID: CPT | Performed by: OBSTETRICS & GYNECOLOGY

## 2023-06-07 PROCEDURE — 82247 BILIRUBIN TOTAL: CPT | Performed by: OBSTETRICS & GYNECOLOGY

## 2023-06-07 PROCEDURE — 84075 ASSAY ALKALINE PHOSPHATASE: CPT | Performed by: OBSTETRICS & GYNECOLOGY

## 2023-06-07 PROCEDURE — 0503F POSTPARTUM CARE VISIT: CPT | Performed by: OBSTETRICS & GYNECOLOGY

## 2023-06-07 PROCEDURE — 25010000002 FUROSEMIDE PER 20 MG

## 2023-06-07 PROCEDURE — 25010000002 AMPICILLIN PER 500 MG: Performed by: OBSTETRICS & GYNECOLOGY

## 2023-06-07 PROCEDURE — 83615 LACTATE (LD) (LDH) ENZYME: CPT | Performed by: OBSTETRICS & GYNECOLOGY

## 2023-06-07 PROCEDURE — 82565 ASSAY OF CREATININE: CPT | Performed by: OBSTETRICS & GYNECOLOGY

## 2023-06-07 PROCEDURE — 85025 COMPLETE CBC W/AUTO DIFF WBC: CPT | Performed by: OBSTETRICS & GYNECOLOGY

## 2023-06-07 RX ORDER — FUROSEMIDE 10 MG/ML
20 INJECTION INTRAMUSCULAR; INTRAVENOUS ONCE
Status: COMPLETED | OUTPATIENT
Start: 2023-06-07 | End: 2023-06-07

## 2023-06-07 RX ORDER — METOPROLOL SUCCINATE 25 MG/1
25 TABLET, EXTENDED RELEASE ORAL
Status: DISCONTINUED | OUTPATIENT
Start: 2023-06-08 | End: 2023-06-08 | Stop reason: HOSPADM

## 2023-06-07 RX ORDER — METOPROLOL SUCCINATE 25 MG/1
25 TABLET, EXTENDED RELEASE ORAL EVERY OTHER DAY
Status: DISCONTINUED | OUTPATIENT
Start: 2023-06-28 | End: 2023-06-08 | Stop reason: HOSPADM

## 2023-06-07 RX ADMIN — METOPROLOL SUCCINATE 25 MG: 25 TABLET, EXTENDED RELEASE ORAL at 08:05

## 2023-06-07 RX ADMIN — AMPICILLIN SODIUM 2 G: 2 INJECTION, POWDER, FOR SOLUTION INTRAVENOUS at 14:57

## 2023-06-07 RX ADMIN — NIFEDIPINE 30 MG: 30 TABLET, EXTENDED RELEASE ORAL at 21:15

## 2023-06-07 RX ADMIN — IBUPROFEN 600 MG: 600 TABLET ORAL at 08:05

## 2023-06-07 RX ADMIN — NIFEDIPINE 30 MG: 30 TABLET, EXTENDED RELEASE ORAL at 08:05

## 2023-06-07 RX ADMIN — GENTAMICIN SULFATE 350 MG: 40 INJECTION, SOLUTION INTRAMUSCULAR; INTRAVENOUS at 09:23

## 2023-06-07 RX ADMIN — AMPICILLIN SODIUM 2 G: 2 INJECTION, POWDER, FOR SOLUTION INTRAVENOUS at 02:58

## 2023-06-07 RX ADMIN — AMPICILLIN SODIUM 2 G: 2 INJECTION, POWDER, FOR SOLUTION INTRAVENOUS at 08:42

## 2023-06-07 RX ADMIN — Medication 10 ML: at 08:06

## 2023-06-07 RX ADMIN — FUROSEMIDE 20 MG: 10 INJECTION, SOLUTION INTRAMUSCULAR; INTRAVENOUS at 11:15

## 2023-06-07 RX ADMIN — Medication 10 ML: at 21:16

## 2023-06-07 NOTE — PROGRESS NOTES
"  [facility]  PROGRESS NOTE    Date of Admission: 6/6/2023  Date of Service: 06/07/23    Primary Care Physician: Provider, No Known    Chief Complaint: Acute Postpartum Diastolic Heart Failure      Subjective      HPI: Reports improvement to her shortness of breath, blood pressure under better control. No pain or other acute changes overnight      Objective   Vitals: /83 (BP Location: Right arm, Patient Position: Sitting)   Pulse 97   Temp 98.6 °F (37 °C) (Oral)   Resp 18   Ht 162.6 cm (64\")   Wt 92 kg (202 lb 12.8 oz)   LMP 09/05/2022   SpO2 95%   BMI 34.81 kg/m²     Physical Exam:   GENERAL: Alert, cooperative, in no acute distress.   HEART: Regular rate and rhythm; 1/6 hsm at apex  LUNGS: Clear to auscultation bilaterally. No wheezing, rales or rhonchi. Nonlabored breathing  NEUROLOGIC: No gross focal abnormalities  EXTREMITIES: No obvious deformities. 1+ pedal/pretibial edema noted  PSYCH: normal mood, behavior    Results:  Results from last 7 days   Lab Units 06/07/23  0557 06/06/23  0306 06/03/23  0725   WBC 10*3/mm3 13.65* 14.54* 13.25*   HEMOGLOBIN g/dL 10.5* 8.8* 8.0*   HEMATOCRIT % 34.0 26.8* 23.7*   PLATELETS 10*3/mm3 318 272 167     Results from last 7 days   Lab Units 06/07/23  0557 06/06/23  0300 06/01/23  1648   SODIUM mmol/L  --  139  --    POTASSIUM mmol/L  --  4.2  --    CHLORIDE mmol/L  --  100  --    CO2 mmol/L  --  24.0  --    BUN mg/dL  --  14  --    CREATININE mg/dL 0.60 0.63 0.53*   GLUCOSE mg/dL  --  110*  --       Lab Results   Component Value Date    AST 33 (H) 06/07/2023    ALT 50 (H) 06/07/2023                             Results from last 7 days   Lab Units 06/06/23  0300   PROBNP pg/mL 1,003.0*         Intake/Output Summary (Last 24 hours) at 6/7/2023 0754  Last data filed at 6/7/2023 0737  Gross per 24 hour   Intake 4018.75 ml   Output 36683 ml   Net -9981.25 ml       I personally reviewed the patient's EKG/Telemetry " data    Radiology Data:   CTA Chest 6/6/23:  IMPRESSION:     1. No evidence of pulmonary embolism.  2. No evidence of thoracic aortic aneurysm or dissection.  3. Mild cardiomegaly with mild pulmonary edema and small to moderate bilateral pleural effusions.     Echo 6/6/23:    Left ventricular systolic function is normal. Calculated left ventricular EF = 50.3% Left ventricular ejection fraction appears to be 51 - 55%.    Left ventricular wall thickness is consistent with hypertrophy.    Left ventricular diastolic function was normal.    Mild to moderate mitral valve regurgitation is present    Moderate tricuspid valve regurgitation is present.    Estimated right ventricular systolic pressure from tricuspid regurgitation is markedly elevated (>55 mmHg). Calculated right ventricular systolic pressure from tricuspid regurgitation is 61 mmHg.    Current Medications:  ampicillin, 2 g, Intravenous, Q6H  gentamicin, 5 mg/kg (Adjusted), Intravenous, Q24H  metoprolol succinate XL, 25 mg, Oral, Q24H  NIFEdipine XL, 30 mg, Oral, Q12H  sodium chloride, 10 mL, Intravenous, Q8H  sodium chloride, 10 mL, Intravenous, Q12H      dextrose 5 % and sodium chloride 0.45 %, 75 mL/hr, Last Rate: 75 mL/hr (06/06/23 1958)  magnesium sulfate, 1 g/hr, Last Rate: 1 g/hr (06/06/23 1934)        Assessment  Preeclampsia with Acute HFpEF  CT Chest with pulmonary edema and bilateral pleural effusions  Echo 6/6/23: EF 50%, LVH noted, mild-mod MR, mod TR with pulmonary hypertension  Postpartum Anemia  Hgb 8.8        Plan  The patient has pulmonary edema with bilateral pleural effusions due to mild acute heart failure with preserved EF 4 days PP in the setting of preeclampsia and anemia.  Recommend supportive care with blood pressure control and diuretics with renal and electrolyte monitoring.  Continue metoprolol succinate for heart rate and added blood pressure control. Take for 3 weeks then wean off with a dose every other day x1 week then can  discontinue.  Echocardiogram with LV EF 51-55%, mild to moderate MR and signs of pulmonary hypertension. Results in setting of anemia. Schedule follow up echo in 3 months.  20 mg IV Lasix this morning and further diuretic management per primary team. Would like to avoid further if possible due to lactation suppression.    Cardiology will follow up on a PRN basis. Patient can follow up in Dr. Mishra's office 3 months after discharge with same day echocardiogram ahead of the appointment. Call for further recommendations/questions.    Electronically signed by Filipe May PA-C, 06/07/23, 10:41 AM EDT.

## 2023-06-07 NOTE — PROGRESS NOTES
Postpartum Progress Note    Patient name: Karen Garnica  YOB: 1985   MRN: [id number]  Referring Provider: Karlos Dias MD  Admission Date: 2023  Date of Service: 2023    ID: [id number] y.o.     Diagnosis:   S/p vaginal delivery   Preeclampsia    Pulmonary edema    Endometritis       Subjective:      No complaints this morning and she is breathing better. No longer on O2  BP well controlled off of magnesium sulfate.   No fever, chills, nausea or vomiting.    Moderate lochia.  Ambulating, voiding, tolerating diet.  Pain well controlled.  The patient is currently breastfeeding.   Discussed cardiologist recommendations as well as Abx plan.     Objective:      Vital signs:  Vital Signs Range for the last 24 hours  Temperature: Temp:  [97.9 °F (36.6 °C)-98.6 °F (37 °C)] 98.2 °F (36.8 °C)   Temp Source: Temp src: Oral   BP: BP: (118-141)/(62-87) 128/80   Pulse: Heart Rate:  [] 88   Respirations: Resp:  [16-18] 16   Weight: 92 kg (202 lb 12.8 oz)     General: Alert & oriented x4, in no apparent distress  Abdomen: soft, nontender  Uterus: firm, nontender  Extremities: nontender; no edema      Labs:  Lab Results   Component Value Date    WBC 13.65 (H) 2023    HGB 10.5 (L) 2023    HCT 34.0 2023    .7 (H) 2023     2023     Results from last 7 days   Lab Units 23  0305   ABO TYPING  O   RH TYPING  Positive     External Prenatal Results       Pregnancy Outside Results - Transcribed From Office Records - See Scanned Records For Details       Test Value Date Time    ABO  O  23 0305    Rh  Positive  23 0305    Antibody Screen  Negative  23 0305       Negative  23 1648    Varicella IgG       Rubella       Hgb  10.5 g/dL 23 0557       8.8 g/dL 23 0306       8.0 g/dL 23 0725       9.5 g/dL 23 1648       9.3 g/dL 23 1909       10.5 g/dL 05/10/23 1147      ^ 10.7 g/dL 23 1442      ^ 11.2  g/dL 03/20/23 0848      ^ 12.2 g/dL 11/14/22 1713    Hct  34.0 % 06/07/23 0557       26.8 % 06/06/23 0306       23.7 % 06/03/23 0725       28.0 % 06/01/23 1648       27.7 % 05/30/23 1909       30.7 % 05/10/23 1147      ^ 31.8 % 04/17/23 1442      ^ 34.0 % 03/20/23 0848      ^ 36.0 % 11/14/22 1713    Glucose Fasting GTT       Glucose Tolerance Test 1 hour       Glucose Tolerance Test 3 hour       Gonorrhea (discrete)  Negative  12/01/15 1034    Chlamydia (discrete)       RPR       VDRL       Syphilis Antibody       HBsAg ^ Negative  11/14/22 1713    Herpes Simplex Virus PCR       Herpes Simplex VIrus Culture       HIV ^ Nonreactive  11/14/22 1713    Hep C RNA Quant PCR       Hep C Antibody       AFP       Group B Strep  No Group B Streptococcus isolated  05/18/23 1816    GBS Susceptibility to Clindamycin       GBS Susceptibility to Erythromycin       Fetal Fibronectin       Genetic Testing, Maternal Blood                 Drug Screening       Test Value Date Time    Urine Drug Screen       Amphetamine Screen  Negative ng/mL 05/22/23 1100    Barbiturate Screen  Negative ng/mL 05/22/23 1100    Benzodiazepine Screen  Negative ng/mL 05/22/23 1100    Methadone Screen  Negative ng/mL 05/22/23 1100    Phencyclidine Screen  Negative ng/mL 05/22/23 1100    Opiates Screen       THC Screen       Cocaine Screen       Propoxyphene Screen  Negative ng/mL 05/22/23 1100    Buprenorphine Screen  Negative ng/mL 12/01/15 1034    Methamphetamine Screen       Oxycodone Screen  Negative ng/mL 12/01/15 1034    Tricyclic Antidepressants Screen                 Legend    ^: Historical                            Assessment/Plan:      PPD#5 s/p vaginal delivery.  1. S/p vaginal delivery: Doing well.Continue routine postpartum care.    2. Infant feeding: Supportive care.  The patient is currently breastfeeding.  3.  Endometritis: Will continue IV antibiotics  4.  Preeclampsia: Doing well on PO medications and now off of magnesium sulfate with  good diuresis. Reviewed labs today.   5.  Pulmonary edema: Will continue to follow cardiology recs.     Karlos Dias MD  06/07/23

## 2023-06-07 NOTE — PLAN OF CARE
Problem: Adult Inpatient Plan of Care  Goal: Plan of Care Review  Outcome: Ongoing, Progressing  Goal: Patient-Specific Goal (Individualized)  Outcome: Ongoing, Progressing  Goal: Absence of Hospital-Acquired Illness or Injury  Outcome: Ongoing, Progressing  Intervention: Identify and Manage Fall Risk  Intervention: Prevent Skin Injury  Intervention: Prevent and Manage VTE (Venous Thromboembolism) Risk  Goal: Optimal Comfort and Wellbeing  Outcome: Ongoing, Progressing  Intervention: Provide Person-Centered Care  Goal: Readiness for Transition of Care  Outcome: Ongoing, Progressing   Goal Outcome Evaluation:

## 2023-06-08 VITALS
BODY MASS INDEX: 34.62 KG/M2 | TEMPERATURE: 98.1 F | WEIGHT: 202.8 LBS | HEIGHT: 64 IN | HEART RATE: 86 BPM | OXYGEN SATURATION: 96 % | RESPIRATION RATE: 16 BRPM | DIASTOLIC BLOOD PRESSURE: 75 MMHG | SYSTOLIC BLOOD PRESSURE: 133 MMHG

## 2023-06-08 PROCEDURE — 0503F POSTPARTUM CARE VISIT: CPT | Performed by: OBSTETRICS & GYNECOLOGY

## 2023-06-08 RX ORDER — NIFEDIPINE 30 MG/1
30 TABLET, FILM COATED, EXTENDED RELEASE ORAL EVERY 12 HOURS SCHEDULED
Qty: 60 TABLET | Refills: 0 | Status: SHIPPED | OUTPATIENT
Start: 2023-06-08 | End: 2023-07-08

## 2023-06-08 RX ORDER — METOPROLOL SUCCINATE 25 MG/1
25 TABLET, EXTENDED RELEASE ORAL
Qty: 20 TABLET | Refills: 0 | Status: SHIPPED | OUTPATIENT
Start: 2023-06-08 | End: 2023-06-28

## 2023-06-08 NOTE — PAYOR COMM NOTE
"Kvng Luna (38 y.o. Female) clinical info & notice of discharge  [id number]      Date of Birth   1985    Social Security Number       Address   [address] [facility] [facility] [address]    Home Phone   [phone]    MRN   [id number]       Jehovah's witness   None    Marital Status                               Admission Date   23    Admission Type   Elective    Admitting Provider   Skyler Sapp III, MD    Attending Provider       Department, Room/Bed   [facility] ANTEPARTUM, N323/       Discharge Date   2023    Discharge Disposition   Home or Self Care    Discharge Destination   Home                              Attending Provider: (none)   Allergies: No Known Allergies    Isolation: None   Infection: None   Code Status: Prior    Ht: 162.6 cm (64\")   Wt: 92 kg (202 lb 12.8 oz)    Admission Cmt: None   Principal Problem: Pulmonary edema [J81.1]                   Active Insurance as of 2023       Primary Coverage       Payor Plan Insurance Group Employer/Plan Group    ANTHEM BLUE CROSS ANTHEM BLUE CROSS BLUE SHIELD PPO [id number]       Payor Plan Address Payor Plan Phone Number Payor Plan Fax Number Effective Dates    [address] [phone]  2021 - None Entered    [facility] [address]         Subscriber Name Subscriber Birth Date Member ID       KVNG LUNA 1985 [id number]               Secondary Coverage       Payor Plan Insurance Group Employer/Plan Group    [place]        Payor Plan Address Payor Plan Phone Number Payor Plan Fax Number Effective Dates    [address]   10/1/2019 - None Entered    [facility] [address]         Subscriber Name Subscriber Birth Date Member ID       KVNG LUNA 1985 [id number]                     Emergency Contacts        (Rel.) Home Phone Work Phone Mobile Phone    Dahlia LUNA (Spouse) -- -- [phone]              Insurance Information          "         ANTH BLUE CROSS/ANTH BLUE CROSS BLUE SHIELD PPO Phone: [phone]    Subscriber: Karen Garnica Subscriber#: [id number]    Group#: [id number] Precert#: --        [place] Phone: --    Subscriber: Karen Garnica Subscriber#: [id number]    Group#: -- Precert#: --             History & Physical        Karlos Dias MD at 23 0832          [facility]  Obstetric History and Physical    Referring Provider: Karlos Dias MD      Chief Complaint   Patient presents with    Elevated Blood Pressure   Fever  Shortness of air    Subjective    Patient is a 38 y.o. female  4 days postpartum presented to L&D overnight with elevated blood pressure, subjective fever, and shortness of air.  She was found to have abnormal PEP consistent with preeclampsia along with severe range BP.  She was found to have pulmonary edema as well.     Her prenatal care is complicated by  hypertension  gestational hypertension.  She delivered vaginally and peripartum course complicated by chorioamnionitis. She was treated with IV abx x 24 hours without fever prior to DC. Her previous obstetric/gynecological history is noted for is remarkable for severe preeclampsia with prior delivery.     The following portions of the patients history were reviewed and updated as appropriate: current medications, allergies, past medical history, past surgical history, past family history, past social history, and problem list .       Prenatal Information:   Maternal Prenatal Labs  Blood Type ABO Type   Date Value Ref Range Status   2023 O  Final      Rh Status RH type   Date Value Ref Range Status   2023 Positive  Final      Antibody Screen Antibody Screen   Date Value Ref Range Status   2023 Negative  Final      Gonnorhea No results found for: GCCX   Chlamydia No results found for: CLAMYDCU   RPR No results found for: RPR   Syphilis Antibody No results found for:  SYPHILIS   Rubella No results found for: RUBELLAIGGIN   Hepatitis B Surface Antigen No results found for: HEPBSAG   HIV-1 Antibody No results found for: LABHIV1   Hepatitis C Antibody No results found for: HEPCAB   Rapid Urin Drug Screen No results found for: AMPMETHU, BARBITSCNUR, LABBENZSCN, LABMETHSCN, LABOPIASCN, THCURSCR, COCAINEUR, AMPHETSCREEN, PROPOXSCN, BUPRENORSCNU, METAMPSCNUR, OXYCODONESCN, TRICYCLICSCN   Group B Strep Culture No results found for: GBSANTIGEN           External Prenatal Results       Pregnancy Outside Results - Transcribed From Office Records - See Scanned Records For Details       Test Value Date Time    ABO  O  06/06/23 0305    Rh  Positive  06/06/23 0305    Antibody Screen  Negative  06/06/23 0305       Negative  06/01/23 1648    Varicella IgG       Rubella       Hgb  8.8 g/dL 06/06/23 0306       8.0 g/dL 06/03/23 0725       9.5 g/dL 06/01/23 1648       9.3 g/dL 05/30/23 1909       10.5 g/dL 05/10/23 1147      ^ 10.7 g/dL 04/17/23 1442      ^ 11.2 g/dL 03/20/23 0848      ^ 12.2 g/dL 11/14/22 1713    Hct  26.8 % 06/06/23 0306       23.7 % 06/03/23 0725       28.0 % 06/01/23 1648       27.7 % 05/30/23 1909       30.7 % 05/10/23 1147      ^ 31.8 % 04/17/23 1442      ^ 34.0 % 03/20/23 0848      ^ 36.0 % 11/14/22 1713    Glucose Fasting GTT       Glucose Tolerance Test 1 hour       Glucose Tolerance Test 3 hour       Gonorrhea (discrete)  Negative  12/01/15 1034    Chlamydia (discrete)       RPR       VDRL       Syphilis Antibody       HBsAg ^ Negative  11/14/22 1713    Herpes Simplex Virus PCR       Herpes Simplex VIrus Culture       HIV ^ Nonreactive  11/14/22 1713    Hep C RNA Quant PCR       Hep C Antibody       AFP       Group B Strep  No Group B Streptococcus isolated  05/18/23 1816    GBS Susceptibility to Clindamycin       GBS Susceptibility to Erythromycin       Fetal Fibronectin       Genetic Testing, Maternal Blood                 Drug Screening       Test Value Date Time     Urine Drug Screen       Amphetamine Screen  Negative ng/mL 23 1100    Barbiturate Screen  Negative ng/mL 23 1100    Benzodiazepine Screen  Negative ng/mL 23 1100    Methadone Screen  Negative ng/mL 23 1100    Phencyclidine Screen  Negative ng/mL 23 1100    Opiates Screen       THC Screen       Cocaine Screen       Propoxyphene Screen  Negative ng/mL 23 1100    Buprenorphine Screen  Negative ng/mL 12/01/15 1034    Methamphetamine Screen       Oxycodone Screen  Negative ng/mL 12/01/15 1034    Tricyclic Antidepressants Screen                 Legend    ^: Historical                              Past OB History:       OB History    Para Term  AB Living   5 4 4 0 1 4   SAB IAB Ectopic Molar Multiple Live Births   1 0 0 0 0 4      # Outcome Date GA Lbr Noah/2nd Weight Sex Delivery Anes PTL Lv   5 Term 23 38w4d 15:53 / 00:17 3790 g (8 lb 5.7 oz) M Vag-Spont EPI N JONN      Name: KVNG LUNAALEKS      Apgar1: 8  Apgar5: 9   4 Term 16 37w4d  3260 g (7 lb 3 oz) M Vag-Spont EPI  JONN      Complications: Pregnancy-induced hypertension   3 Term 04/04/15 38w2d  3459 g (7 lb 10 oz) F Vag-Spont EPI  JONN      Complications: Pregnancy-induced hypertension   2 SAB            1 Term 07/08/10 39w0d  2977 g (6 lb 9 oz) M Vag-Spont EPI  JONN      Complications: Pregnancy-induced hypertension       Past Medical History: Past Medical History:   Diagnosis Date    Anemia 2022    Anxiety     Chronic hypertension     Gestational diabetes     Gestational hypertension     History of pre-eclampsia 2015    History of recurrent UTIs     Pulmonary edema 2015      Past Surgical History Past Surgical History:   Procedure Laterality Date    NO PAST SURGERIES        Family History: Family History   Problem Relation Age of Onset    Diabetes type II Father     Hypertension Father     Hypertension Mother     Other Son         bifid uvula and polydactyly    Breast cancer Neg Hx      Colon cancer Neg Hx     Ovarian cancer Neg Hx       Social History:  reports that she has never smoked. She does not have any smokeless tobacco history on file.   reports that she does not currently use alcohol after a past usage of about 2.0 standard drinks per week.   reports no history of drug use.                   General ROS Negative Findings:Headaches, Visual Changes, Epigastric pain, Anorexia, Nausia/Vomiting, ROM, and Vaginal Bleeding    Review of Systems   Constitutional: Positive for chills, fever and malaise/fatigue.   HENT: Negative.     Cardiovascular:  Positive for dyspnea on exertion. Negative for chest pain, irregular heartbeat, near-syncope, palpitations and syncope.   Respiratory:  Positive for shortness of breath. Negative for cough and hemoptysis.    Endocrine: Negative.    Hematologic/Lymphatic: Negative.    Skin: Negative.    Musculoskeletal: Negative.    Gastrointestinal: Negative.    Genitourinary: Negative.    Neurological: Negative.    Psychiatric/Behavioral: Negative.        All other systems have been reviewed and are neg  Objective      Vital Signs Range for the last 24 hours  Temperature: Temp:  [98.9 °F (37.2 °C)-99.8 °F (37.7 °C)] 99.8 °F (37.7 °C)   Temp Source: Temp src: Oral   BP: BP: (144-154)/() 146/83   Pulse: Heart Rate:  [] 101   Respirations: Resp:  [16-20] 18   SPO2: SpO2:  [92 %-96 %] 93 %   O2 Amount (l/min):     O2 Devices Device (Oxygen Therapy): room air   Weight: Weight:  [92 kg (202 lb 12.8 oz)-97.5 kg (215 lb)] 92 kg (202 lb 12.8 oz)     Physical Examination:   General:   alert, appears stated age, cooperative, and moderate distress   Skin:   normal   HEENT:     Lungs:   diminished breath sounds bilaterally   Heart:   regular rate and rhythm, S1, S2 normal, no murmur, click, rub or gallop   Abdomen:  soft, non-tender; bowel sounds normal; no masses,  no organomegaly   Lower Extremities    Pelvis:  Exam deferred.         Laboratory Results:   Lab Results  (last 24 hours)       Procedure Component Value Units Date/Time    Blood Gas, Arterial With Co-Ox [[id number]]  (Abnormal) Collected: 06/06/23 0553    Specimen: Arterial Blood Updated: 06/06/23 0553     Site Right Radial     Sekou's Test N/A     pH, Arterial 7.491 pH units      Comment: 83 Value above reference range        pCO2, Arterial 32.5 mm Hg      Comment: 84 Value below reference range        pO2, Arterial 66.4 mm Hg      Comment: 84 Value below reference range        HCO3, Arterial 24.8 mmol/L      Base Excess, Arterial 1.7 mmol/L      Hemoglobin, Blood Gas 9.1 g/dL      Comment: 84 Value below reference range        Hematocrit, Blood Gas 28.0 %      Oxyhemoglobin 91.7 %      Comment: 84 Value below reference range        Methemoglobin 0.50 %      Carboxyhemoglobin 1.4 %      CO2 Content 25.8 mmol/L      Temperature 37.0 C      Barometric Pressure for Blood Gas --     Comment: N/A        Modality Room Air     FIO2 21 %      Rate 0 Breaths/minute      PIP 0 cmH2O      Comment: Meter: [id number]     :  [id number]        IPAP 0     EPAP 0     Note --     pH, Temp Corrected 7.491 pH Units      pCO2, Temperature Corrected 32.5 mm Hg      pO2, Temperature Corrected 66.4 mm Hg     Comprehensive Metabolic Panel [[id number]]  (Abnormal) Collected: 06/06/23 0300    Specimen: Blood Updated: 06/06/23 0422     Glucose 110 mg/dL      BUN 14 mg/dL      Creatinine 0.63 mg/dL      Sodium 139 mmol/L      Potassium 4.2 mmol/L      Chloride 100 mmol/L      CO2 24.0 mmol/L      Calcium 8.9 mg/dL      Total Protein 6.2 g/dL      Albumin 3.2 g/dL      ALT (SGPT) 52 U/L      AST (SGOT) 51 U/L      Alkaline Phosphatase 115 U/L      Total Bilirubin 0.3 mg/dL      Globulin 3.0 gm/dL      Comment: Calculated Result        A/G Ratio 1.1 g/dL      BUN/Creatinine Ratio 22.2     Anion Gap 15.0 mmol/L      eGFR 117.3 mL/min/1.73     Narrative:      GFR Normal >60  Chronic Kidney Disease <60  Kidney Failure <15      Lactate  Dehydrogenase [[id number]]  (Abnormal) Collected: 06/06/23 0300    Specimen: Blood Updated: 06/06/23 0422      U/L     Uric Acid [[id number]]  (Normal) Collected: 06/06/23 0300    Specimen: Blood Updated: 06/06/23 0422     Uric Acid 5.7 mg/dL      Comment: Falsely depressed results may occur on samples drawn from patients receiving N-Acetylcysteine (NAC) or Metamizole.       BNP [[id number]]  (Abnormal) Collected: 06/06/23 0300    Specimen: Blood Updated: 06/06/23 0414     proBNP 1,003.0 pg/mL     Narrative:      Among patients with dyspnea, NT-proBNP is highly sensitive for the detection of acute congestive heart failure. In addition NT-proBNP of <300 pg/ml effectively rules out acute congestive heart failure with 99% negative predictive value.      CBC (No Diff) [[id number]]  (Abnormal) Collected: 06/06/23 0306    Specimen: Blood Updated: 06/06/23 0334     WBC 14.54 10*3/mm3      RBC 2.66 10*6/mm3      Hemoglobin 8.8 g/dL      Hematocrit 26.8 %      .8 fL      MCH 33.1 pg      MCHC 32.8 g/dL      RDW 13.5 %      RDW-SD 48.1 fl      MPV 10.9 fL      Platelets 272 10*3/mm3           Radiology Review:   Imaging Results (Last 24 Hours)       Procedure Component Value Units Date/Time    CT Angiogram Chest [[id number]] Collected: 06/06/23 0416     Updated: 06/06/23 0619    Narrative:      Exam: CT Angiography of the Chest.    Date: 6/6/2023.     Comparison: None    History: 4 days postpartum with productive cough and shortness of breath.    Technique: CT examination of the chest was performed following the intravenous administration of 80 mL of Isovue-370. Sagittal, coronal and 3-D reformatted images were provided. CT dose lowering techniques were used, to include: automated exposure   control, adjustment for patient size, and/or use of iterative reconstruction.    FINDINGS:    Mediastinum and [place]: There is no axillary, mediastinal or hilar lymphadenopathy.    Pleural and Pericardial spaces: There are  small-to-moderate bilateral pleural effusions.    Upper Abdomen: The visualized upper abdomen is unremarkable.    Cardiovascular: The thoracic aorta is normal in size without evidence of aneurysm or dissection. The heart is mildly enlarged.    Pulmonary Artery: There are no filling defects in the pulmonary arteries.    Lung Parenchyma and Airways: There is scattered septal thickening seen diffusely throughout the lungs bilaterally which is likely related to pulmonary edema.    Bones: No fracture or aggressive osseous lesion.      Impression:        1. No evidence of pulmonary embolism.  2. No evidence of thoracic aortic aneurysm or dissection.  3. Mild cardiomegaly with mild pulmonary edema and small to moderate bilateral pleural effusions.      Electronically signed by:  Louis Cervantes D.O.    2023 4:18 AM Mountain Time          Other Studies:    Assessment & Plan      Pulmonary edema    Endometritis        Assessment:  1.  38 year old multiparous female PPD number 4 s/p   2.  Severe preeclampsia  3. Endometritis  4.  Pulmonary edema    Plan:  1. Patient admitted  2. Severe preeclampsia- Start magnesium sulfate for seizure prophylaxis. Nifedipine XL 30 mg daily for severe range BP. Will monitor labs.  3. Pulmonary edema- ECHO in progress now. Cardiology consulted.  Responding well to dose of lasix this morning.   4. Will start back on ampicillin and gentamicin due to likely endometritis. If fever increases or no response to Abx will proceed with TVUS.     Karlos Dias MD  2023  08:32 EDT    Electronically signed by Karlos Dias MD at 23 0841       Skyler Sapp III, MD at 23 0327          [facility]  Obstetric History and Physical    Chief Complaint   Patient presents with    Elevated Blood Pressure       Subjective     Patient is a 38 y.o. female  currently at 38w4d, who presents with complaints of acute shortness of breath which became noticeably worse  beginning at 0100.  She describes bilateral chest pain below the costal margin.  She denies fever/chills, cough, headache, scotomata or epigastric pain.  She reports she had a history of prior pulmonary edema following prior vaginal deliveries.  She is now postpartum day #4 status post vaginal delivery    Her prenatal care was reportedly benign. Her previous obstetric/gynecological history is notable for a history of 4 prior vaginal deliveries and 1 spontaneous .  She reports prior pulmonary edema treated with Lasix following a prior delivery.    The following portions of the patients history were reviewed and updated as appropriate: current medications, allergies, past medical history, past surgical history, past family history, past social history, and problem list .        Prenatal Information:   Maternal Prenatal Labs  Blood Type No results found for: ABO   Rh Status No results found for: RH   Antibody Screen No results found for: ABSCRN   Gonnorhea No results found for: GCCX   Chlamydia No results found for: CLAMYDCU   RPR No results found for: RPR   Syphilis Antibody No results found for: SYPHILIS   Rubella No results found for: RUBELLAIGGIN   Hepatitis B Surface Antigen No results found for: HEPBSAG   HIV-1 Antibody No results found for: LABHIV1   Hepatitis C Antibody No results found for: HEPCAB   Rapid Urin Drug Screen No results found for: AMPMETHU, BARBITSCNUR, LABBENZSCN, LABMETHSCN, LABOPIASCN, THCURSCR, COCAINEUR, AMPHETSCREEN, PROPOXSCN, BUPRENORSCNU, METAMPSCNUR, OXYCODONESCN, TRICYCLICSCN   Group B Strep Culture No results found for: GBSANTIGEN           External Prenatal Results       Pregnancy Outside Results - Transcribed From Office Records - See Scanned Records For Details       Test Value Date Time    ABO  O  23 1648    Rh  Positive  23 1648    Antibody Screen  Negative  23 1648    Varicella IgG       Rubella       Hgb  8.0 g/dL 23 0725       9.5 g/dL 23  1648       9.3 g/dL 23 1909       10.5 g/dL 05/10/23 1147      ^ 10.7 g/dL 23 1442      ^ 11.2 g/dL 23 0848      ^ 12.2 g/dL 22 1713    Hct  23.7 % 23 0725       28.0 % 23 1648       27.7 % 23 1909       30.7 % 05/10/23 1147      ^ 31.8 % 23 1442      ^ 34.0 % 23 0848      ^ 36.0 % 22 1713    Glucose Fasting GTT       Glucose Tolerance Test 1 hour       Glucose Tolerance Test 3 hour       Gonorrhea (discrete)  Negative  12/01/15 1034    Chlamydia (discrete)       RPR       VDRL       Syphilis Antibody       HBsAg ^ Negative  22 1713    Herpes Simplex Virus PCR       Herpes Simplex VIrus Culture       HIV ^ Nonreactive  22 1713    Hep C RNA Quant PCR       Hep C Antibody       AFP       Group B Strep  No Group B Streptococcus isolated  23 1816    GBS Susceptibility to Clindamycin       GBS Susceptibility to Erythromycin       Fetal Fibronectin       Genetic Testing, Maternal Blood                 Drug Screening       Test Value Date Time    Urine Drug Screen       Amphetamine Screen  Negative ng/mL 23 1100    Barbiturate Screen  Negative ng/mL 23 1100    Benzodiazepine Screen  Negative ng/mL 23 1100    Methadone Screen  Negative ng/mL 23 1100    Phencyclidine Screen  Negative ng/mL 23 1100    Opiates Screen       THC Screen       Cocaine Screen       Propoxyphene Screen  Negative ng/mL 23 1100    Buprenorphine Screen  Negative ng/mL 12/01/15 1034    Methamphetamine Screen       Oxycodone Screen  Negative ng/mL 12/01/15 1034    Tricyclic Antidepressants Screen                 Legend    ^: Historical                              Past OB History:       OB History    Para Term  AB Living   5 4 4 0 1 4   SAB IAB Ectopic Molar Multiple Live Births   1 0 0 0 0 4      # Outcome Date GA Lbr Noah/2nd Weight Sex Delivery Anes PTL Lv   5 Term 23 38w4d 15:53 / 00:17 3790 g (8 lb 5.7 oz) M Vag-Spont  EPI N JONN      Name: MAAME LUNA      Apgar1: 8  Apgar5: 9   4 Term 06/14/16 37w4d  3260 g (7 lb 3 oz) M Vag-Spont EPI  JONN      Complications: Pregnancy-induced hypertension   3 Term 04/04/15 38w2d  3459 g (7 lb 10 oz) F Vag-Spont EPI  JONN      Complications: Pregnancy-induced hypertension   2 SAB 2013           1 Term 07/08/10 39w0d  2977 g (6 lb 9 oz) M Vag-Spont EPI  JONN      Complications: Pregnancy-induced hypertension       Past Medical History:  Past Medical History:   Diagnosis Date    Anemia 09/01/2022    Anxiety     Chronic hypertension     Gestational diabetes     Gestational hypertension     History of pre-eclampsia 02/01/2015    History of recurrent UTIs     Pulmonary edema 02/2015      Past Surgical History Past Surgical History:   Procedure Laterality Date    NO PAST SURGERIES        Family History: Family History   Problem Relation Age of Onset    Diabetes type II Father     Hypertension Father     Hypertension Mother     Other Son         bifid uvula and polydactyly    Breast cancer Neg Hx     Colon cancer Neg Hx     Ovarian cancer Neg Hx       Social History:  reports that she has never smoked. She does not have any smokeless tobacco history on file.   reports that she does not currently use alcohol after a past usage of about 2.0 standard drinks per week.   reports no history of drug use.   Allergies: Patient has no known allergies.  Current Medications:          No current facility-administered medications on file prior to encounter.     Current Outpatient Medications on File Prior to Encounter   Medication Sig Dispense Refill    Blood Glucose Monitoring Suppl (OneTouch Verio Flex System) w/Device kit       docusate sodium (COLACE) 100 MG capsule Take 1 capsule by mouth 2 (Two) Times a Day. 60 capsule 1    ferrous sulfate 325 (65 FE) MG tablet Take 1 tablet by mouth Daily With Breakfast. 60 tablet 1    fish oil-omega-3 fatty acids 1000 MG capsule Take  by mouth.      ibuprofen  (ADVIL,MOTRIN) 600 MG tablet Take 1 tablet by mouth Every 6 (Six) Hours As Needed for Mild Pain (First Line: Mild pain.). 60 tablet 1    Lancets (OneTouch Delica Plus [id number]) misc       Prenatal Vit-Fe Fumarate-FA (Prenatal Vitamin) 27-0.8 MG tablet Take 1 tablet by mouth Daily. 30 tablet 11       General ROS: Pertinent items are noted in HPI, all other systems reviewed and negative    Objective       Vital Signs Range for the last 24 hours  Temperature: Temp:  [98.9 °F (37.2 °C)] 98.9 °F (37.2 °C)   Temp Source: Temp src: Oral   BP: BP: (150-154)/(104-122) 153/104   Pulse: Heart Rate:  [] 105   Respirations: Resp:  [20] 20   SPO2: SpO2:  [94 %-96 %] 94 %   O2 Amount (l/min):     O2 Devices     Weight: Weight:  [97.5 kg (215 lb)] 97.5 kg (215 lb)     Physical Examination: General appearance - oriented to person, place, and time, anxious, and in mild to moderate distress  Mental status - alert, oriented to person, place, and time, anxious, agitated  Eyes - pupils equal and reactive, extraocular eye movements intact, sclera anicteric  Neck - supple, no significant adenopathy, no JVD noted.  Chest - clear to auscultation, no wheezes, rales or rhonchi, symmetric air entry  Heart - normal rate, regular rhythm, normal S1, S2, no murmurs, rubs, clicks or gallops  Abdomen-soft, nontender, nondistended, no masses or organomegaly  Neurological - alert, oriented, normal speech, no focal findings or movement disorder noted  Extremities - pedal edema 3+    Laboratory Results:   Lab Results   Component Value Date    ALKPHOS 128 (H) 06/01/2023    ALT 34 (H) 06/01/2023    AST 28 06/01/2023    CREATININE 0.53 (L) 06/01/2023    BILITOT 0.7 06/01/2023     06/01/2023    URICACID 5.7 06/01/2023       WBC   Date Value Ref Range Status   06/03/2023 13.25 (H) 3.40 - 10.80 10*3/mm3 Final     RBC   Date Value Ref Range Status   06/03/2023 2.44 (L) 3.77 - 5.28 10*6/mm3 Final     Hemoglobin   Date Value Ref Range Status    06/03/2023 8.0 (L) 12.0 - 15.9 g/dL Final     Hematocrit   Date Value Ref Range Status   06/03/2023 23.7 (L) 34.0 - 46.6 % Final     MCV   Date Value Ref Range Status   06/03/2023 97.1 (H) 79.0 - 97.0 fL Final     MCH   Date Value Ref Range Status   06/03/2023 32.8 26.6 - 33.0 pg Final     MCHC   Date Value Ref Range Status   06/03/2023 33.8 31.5 - 35.7 g/dL Final     RDW   Date Value Ref Range Status   06/03/2023 13.0 12.3 - 15.4 % Final     RDW-SD   Date Value Ref Range Status   06/03/2023 45.1 37.0 - 54.0 fl Final     MPV   Date Value Ref Range Status   06/03/2023 11.2 6.0 - 12.0 fL Final     Platelets   Date Value Ref Range Status   06/03/2023 167 140 - 450 10*3/mm3 Final     Neutrophil %   Date Value Ref Range Status   06/03/2023 79.2 (H) 42.7 - 76.0 % Final     Lymphocyte %   Date Value Ref Range Status   06/03/2023 13.9 (L) 19.6 - 45.3 % Final     Monocyte %   Date Value Ref Range Status   06/03/2023 5.3 5.0 - 12.0 % Final     Eosinophil %   Date Value Ref Range Status   06/03/2023 0.1 (L) 0.3 - 6.2 % Final     Basophil %   Date Value Ref Range Status   06/03/2023 0.2 0.0 - 1.5 % Final     Immature Grans %   Date Value Ref Range Status   06/03/2023 1.3 (H) 0.0 - 0.5 % Final     Neutrophils, Absolute   Date Value Ref Range Status   06/03/2023 10.51 (H) 1.70 - 7.00 10*3/mm3 Final     Lymphocytes, Absolute   Date Value Ref Range Status   06/03/2023 1.84 0.70 - 3.10 10*3/mm3 Final     Monocytes, Absolute   Date Value Ref Range Status   06/03/2023 0.70 0.10 - 0.90 10*3/mm3 Final     Eosinophils, Absolute   Date Value Ref Range Status   06/03/2023 0.01 0.00 - 0.40 10*3/mm3 Final     Basophils, Absolute   Date Value Ref Range Status   06/03/2023 0.02 0.00 - 0.20 10*3/mm3 Final     Immature Grans, Absolute   Date Value Ref Range Status   06/03/2023 0.17 (H) 0.00 - 0.05 10*3/mm3 Final     nRBC   Date Value Ref Range Status   06/03/2023 0.2 0.0 - 0.2 /100 WBC Final       Results from last 7 days   Lab Units  "06/01/23  1648   ABO TYPING  O   RH TYPING  Positive   ANTIBODY SCREEN  Negative       Brief Urine Lab Results  (Last result in the past 365 days)        Color   Clarity   Blood   Leuk Est   Nitrite   Protein   CREAT   Urine HCG        05/30/23 1630           Negative                     Radiology Review: CTA confirms pleural effusions bilaterally with mild pulmonary edema.  No evidence of pulmonary embolism.  Mild cardiomegaly noted.  Other Studies: Significantly abnormal ABG with pH = 7.491, PCO2 = 32.5, and PO2 = 66.4.  H/H = 8.8/26.8%.  WBC = 14.54.  Platelet = 272,000  AST = 51, ALT = 52  proBNP elevated = 1003.0    Assessment & Plan       * No active hospital problems. *        Assessment:  Postpartum pulmonary edema with significantly elevated BNP.  Hypoxemia based on ABG.  Postpartum hypertension.  Mild elevation of liver transaminases but not 2 times normal.  Platelet count is normal.  Given pulmonary edema with hypertension, must consider preeclampsia.      Plan:  Admit.  Echocardiogram and cardiology consult.  Diuresis with Lasix.  Magnesium sulfate for seizure prophylaxis.     Total time spent today with Karen  was 40-54 minutes (level 5).  Of this time, > 50% was spent face-to-face time coordinating care, answering her questions and counseling regarding pathophysiology of her presenting problem along with plans for any diagnostic work-up and treatment.        Skyler Childers \"[place]\" FLORENTINO Sapp MD  6/6/2023  03:27 EDT      Electronically signed by Skyler Sapp III, MD at 06/06/23 0718       Operative/Procedure Notes (last 48 hours)  Notes from 06/06/23 1150 through 06/08/23 1150   No notes of this type exist for this encounter.          Physician Progress Notes (last 48 hours)        Karlos Dias MD at 06/07/23 1311          Postpartum Progress Note    Patient name: Karen Garnica  YOB: 1985   MRN: [id number]  Referring Provider: Karlos Dias MD  Admission Date: " 2023  Date of Service: 2023    ID: 38 y.o.     Diagnosis:   S/p vaginal delivery   Preeclampsia    Pulmonary edema    Endometritis       Subjective:      No complaints this morning and she is breathing better. No longer on O2  BP well controlled off of magnesium sulfate.   No fever, chills, nausea or vomiting.    Moderate lochia.  Ambulating, voiding, tolerating diet.  Pain well controlled.  The patient is currently breastfeeding.   Discussed cardiologist recommendations as well as Abx plan.     Objective:      Vital signs:  Vital Signs Range for the last 24 hours  Temperature: Temp:  [97.9 °F (36.6 °C)-98.6 °F (37 °C)] 98.2 °F (36.8 °C)   Temp Source: Temp src: Oral   BP: BP: (118-141)/(62-87) 128/80   Pulse: Heart Rate:  [] 88   Respirations: Resp:  [16-18] 16   Weight: 92 kg (202 lb 12.8 oz)     General: Alert & oriented x4, in no apparent distress  Abdomen: soft, nontender  Uterus: firm, nontender  Extremities: nontender; no edema      Labs:  Lab Results   Component Value Date    WBC 13.65 (H) 2023    HGB 10.5 (L) 2023    HCT 34.0 2023    .7 (H) 2023     2023     Results from last 7 days   Lab Units 23  0305   ABO TYPING  O   RH TYPING  Positive     External Prenatal Results       Pregnancy Outside Results - Transcribed From Office Records - See Scanned Records For Details       Test Value Date Time    ABO  O  23 0305    Rh  Positive  23 0305    Antibody Screen  Negative  23 0305       Negative  23 1648    Varicella IgG       Rubella       Hgb  10.5 g/dL 23 0557       8.8 g/dL 23 0306       8.0 g/dL 23 0725       9.5 g/dL 23 1648       9.3 g/dL 23 1909       10.5 g/dL 05/10/23 1147      ^ 10.7 g/dL 23 1442      ^ 11.2 g/dL 23 0848      ^ 12.2 g/dL 22 1713    Hct  34.0 % 23 0557       26.8 % 23 0306       23.7 % 23 0725       28.0 % 23 1648        27.7 % 05/30/23 1909       30.7 % 05/10/23 1147      ^ 31.8 % 04/17/23 1442      ^ 34.0 % 03/20/23 0848      ^ 36.0 % 11/14/22 1713    Glucose Fasting GTT       Glucose Tolerance Test 1 hour       Glucose Tolerance Test 3 hour       Gonorrhea (discrete)  Negative  12/01/15 1034    Chlamydia (discrete)       RPR       VDRL       Syphilis Antibody       HBsAg ^ Negative  11/14/22 1713    Herpes Simplex Virus PCR       Herpes Simplex VIrus Culture       HIV ^ Nonreactive  11/14/22 1713    Hep C RNA Quant PCR       Hep C Antibody       AFP       Group B Strep  No Group B Streptococcus isolated  05/18/23 1816    GBS Susceptibility to Clindamycin       GBS Susceptibility to Erythromycin       Fetal Fibronectin       Genetic Testing, Maternal Blood                 Drug Screening       Test Value Date Time    Urine Drug Screen       Amphetamine Screen  Negative ng/mL 05/22/23 1100    Barbiturate Screen  Negative ng/mL 05/22/23 1100    Benzodiazepine Screen  Negative ng/mL 05/22/23 1100    Methadone Screen  Negative ng/mL 05/22/23 1100    Phencyclidine Screen  Negative ng/mL 05/22/23 1100    Opiates Screen       THC Screen       Cocaine Screen       Propoxyphene Screen  Negative ng/mL 05/22/23 1100    Buprenorphine Screen  Negative ng/mL 12/01/15 1034    Methamphetamine Screen       Oxycodone Screen  Negative ng/mL 12/01/15 1034    Tricyclic Antidepressants Screen                 Legend    ^: Historical                            Assessment/Plan:      PPD#5 s/p vaginal delivery.  1. S/p vaginal delivery: Doing well.Continue routine postpartum care.    2. Infant feeding: Supportive care.  The patient is currently breastfeeding.  3.  Endometritis: Will continue IV antibiotics  4.  Preeclampsia: Doing well on PO medications and now off of magnesium sulfate with good diuresis. Reviewed labs today.   5.  Pulmonary edema: Will continue to follow cardiology recs.     Karlos Dias MD  06/07/23        Electronically signed  "by Karlos Dias MD at 06/07/23 1315       Filipe May PA-C at 06/07/23 0758       Attestation signed by Panfilo Mishra MD at 06/07/23 1528    I have reviewed this documentation and agree.                    [facility]  PROGRESS NOTE    Date of Admission: 6/6/2023  Date of Service: 06/07/23    Primary Care Physician: Provider, No Known    Chief Complaint: Acute Postpartum Diastolic Heart Failure      Subjective      HPI: Reports improvement to her shortness of breath, blood pressure under better control. No pain or other acute changes overnight      Objective   Vitals: /83 (BP Location: Right arm, Patient Position: Sitting)   Pulse 97   Temp 98.6 °F (37 °C) (Oral)   Resp 18   Ht 162.6 cm (64\")   Wt 92 kg (202 lb 12.8 oz)   LMP 09/05/2022   SpO2 95%   BMI 34.81 kg/m²     Physical Exam:   GENERAL: Alert, cooperative, in no acute distress.   HEART: Regular rate and rhythm; 1/6 hsm at apex  LUNGS: Clear to auscultation bilaterally. No wheezing, rales or rhonchi. Nonlabored breathing  NEUROLOGIC: No gross focal abnormalities  EXTREMITIES: No obvious deformities. 1+ pedal/pretibial edema noted  PSYCH: normal mood, behavior    Results:  Results from last 7 days   Lab Units 06/07/23  0557 06/06/23  0306 06/03/23  0725   WBC 10*3/mm3 13.65* 14.54* 13.25*   HEMOGLOBIN g/dL 10.5* 8.8* 8.0*   HEMATOCRIT % 34.0 26.8* 23.7*   PLATELETS 10*3/mm3 318 272 167     Results from last 7 days   Lab Units 06/07/23  0557 06/06/23  0300 06/01/23  1648   SODIUM mmol/L  --  139  --    POTASSIUM mmol/L  --  4.2  --    CHLORIDE mmol/L  --  100  --    CO2 mmol/L  --  24.0  --    BUN mg/dL  --  14  --    CREATININE mg/dL 0.60 0.63 0.53*   GLUCOSE mg/dL  --  110*  --       Lab Results   Component Value Date    AST 33 (H) 06/07/2023    ALT 50 (H) 06/07/2023                             Results from last 7 days   Lab Units 06/06/23  0300   PROBNP pg/mL 1,003.0*         Intake/Output " Summary (Last 24 hours) at 6/7/2023 0754  Last data filed at 6/7/2023 0737  Gross per 24 hour   Intake 4018.75 ml   Output 04877 ml   Net -9981.25 ml       I personally reviewed the patient's EKG/Telemetry data    Radiology Data:   CTA Chest 6/6/23:  IMPRESSION:     1. No evidence of pulmonary embolism.  2. No evidence of thoracic aortic aneurysm or dissection.  3. Mild cardiomegaly with mild pulmonary edema and small to moderate bilateral pleural effusions.     Echo 6/6/23:    Left ventricular systolic function is normal. Calculated left ventricular EF = 50.3% Left ventricular ejection fraction appears to be 51 - 55%.    Left ventricular wall thickness is consistent with hypertrophy.    Left ventricular diastolic function was normal.    Mild to moderate mitral valve regurgitation is present    Moderate tricuspid valve regurgitation is present.    Estimated right ventricular systolic pressure from tricuspid regurgitation is markedly elevated (>55 mmHg). Calculated right ventricular systolic pressure from tricuspid regurgitation is 61 mmHg.    Current Medications:  ampicillin, 2 g, Intravenous, Q6H  gentamicin, 5 mg/kg (Adjusted), Intravenous, Q24H  metoprolol succinate XL, 25 mg, Oral, Q24H  NIFEdipine XL, 30 mg, Oral, Q12H  sodium chloride, 10 mL, Intravenous, Q8H  sodium chloride, 10 mL, Intravenous, Q12H      dextrose 5 % and sodium chloride 0.45 %, 75 mL/hr, Last Rate: 75 mL/hr (06/06/23 1958)  magnesium sulfate, 1 g/hr, Last Rate: 1 g/hr (06/06/23 1934)        Assessment  Preeclampsia with Acute HFpEF  CT Chest with pulmonary edema and bilateral pleural effusions  Echo 6/6/23: EF 50%, LVH noted, mild-mod MR, mod TR with pulmonary hypertension  Postpartum Anemia  Hgb 8.8        Plan  The patient has pulmonary edema with bilateral pleural effusions due to mild acute heart failure with preserved EF 4 days PP in the setting of preeclampsia and anemia.  Recommend supportive care with blood pressure control and  diuretics with renal and electrolyte monitoring.  Continue metoprolol succinate for heart rate and added blood pressure control. Take for 3 weeks then wean off with a dose every other day x1 week then can discontinue.  Echocardiogram with LV EF 51-55%, mild to moderate MR and signs of pulmonary hypertension. Results in setting of anemia. Schedule follow up echo in 3 months.  20 mg IV Lasix this morning and further diuretic management per primary team. Would like to avoid further if possible due to lactation suppression.    Cardiology will follow up on a PRN basis. Patient can follow up in Dr. Mishra's office 3 months after discharge with same day echocardiogram ahead of the appointment. Call for further recommendations/questions.    Electronically signed by Filipe May PA-C, 06/07/23, 10:41 AM EDT.      Electronically signed by Panfilo Mishra MD at 06/07/23 1528          Discharge Summary        Nestor Malin MD at 06/08/23 0030          Karen Garnica  [id number]  1985      Referring physician: Karlos Dias MD     Chief complaint: SOB    Date of admission: 6/6/2023    Date of discharge: 6/8/2023    Procedures: echocardiagram, CXR, IV magnesium, IV antibiotics    Consultations: cardiology    Hospital Course: Pt was admitted and started on IV magnesium and IV antibiotics.  Those were discontinued on 6/7.  Pt upset with care tonight and demands d/c home.  Pt has been asked to keep f/u appt with cardiology in 3mo.  D/c meds will be eprescribed to  pharmacy.  Pt has been asked to call Dr. Dias's office this week to sched appt.    P/1) home    I spent over 30 minutes on discharge activity which included: face-to-face encounter counseling with the patient, reviewing the data in the system, coordination of the care with the nursing staff as well as consultants, documentation, and entering orders.      Nestor Malin MD  06/08/23  00:30 EDT     Electronically signed by Nestor Malin MD at  06/08/23 0034

## 2023-06-08 NOTE — NURSING NOTE
"This RN found pt co-sleeping with  when coming to assess midnight vitals. This RN advised pt it is not the safest option to co-sleep. Pt became agitated and stated \"you almost let me die and now you won't help me with my baby. Give me an AMA form.\" This RN left pt room notified laborist and obtained AMA form. This RN returned to the room with laborist to advise pt not to leave. Pt still adamant on leaving AMA. 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

## 2023-06-08 NOTE — DISCHARGE SUMMARY
Karen Garnica  [id number]  1985      Referring physician: Karlos Dias MD     Chief complaint: SOB    Date of admission: 6/6/2023    Date of discharge: 6/8/2023    Procedures: echocardiagram, CXR, IV magnesium, IV antibiotics    Consultations: cardiology    Hospital Course: Pt was admitted and started on IV magnesium and IV antibiotics.  Those were discontinued on 6/7.  Pt upset with care tonight and demands d/c home.  Pt has been asked to keep f/u appt with cardiology in 3mo.  D/c meds will be eprescribed to  pharmacy.  Pt has been asked to call Dr. Dias's office this week to sched appt.    P/1) home    I spent over 30 minutes on discharge activity which included: face-to-face encounter counseling with the patient, reviewing the data in the system, coordination of the care with the nursing staff as well as consultants, documentation, and entering orders.      Nestor Malin MD  06/08/23  00:30 EDT

## 2023-06-09 ENCOUNTER — TELEPHONE (OUTPATIENT)
Dept: OBSTETRICS AND GYNECOLOGY | Facility: CLINIC | Age: 38
End: 2023-06-09

## 2023-06-09 NOTE — TELEPHONE ENCOUNTER
DELETE AFTER REVIEWING: Telephone encounter to be sent to the clinical pool   Hub staff attempted to follow warm transfer process and was unsuccessful     Caller: kimmie     Relationship to patient self    Best call back number: [phone]    Patient is needing: PT WAS JUST DISCHARGED FROM THE HOSPITAL.  FOLLOW UP WITH DR MCCRAY IS NEEDED NO TIME FRAME LISTED AND NO AVAILABILITY

## 2023-07-31 ENCOUNTER — HOSPITAL ENCOUNTER (OUTPATIENT)
Dept: CARDIOLOGY | Facility: HOSPITAL | Age: 38
Setting detail: HOSPITAL OUTPATIENT SURGERY
Discharge: HOME | End: 2023-07-31
Payer: COMMERCIAL

## 2023-07-31 VITALS
SYSTOLIC BLOOD PRESSURE: 130 MMHG | BODY MASS INDEX: 29.88 KG/M2 | HEIGHT: 64 IN | WEIGHT: 175 LBS | DIASTOLIC BLOOD PRESSURE: 80 MMHG

## 2023-07-31 DIAGNOSIS — J81.0 ACUTE PULMONARY EDEMA: ICD-10-CM

## 2023-07-31 DIAGNOSIS — I27.20 PULMONARY HYPERTENSION: ICD-10-CM

## 2023-07-31 DIAGNOSIS — I50.31 ACUTE DIASTOLIC CHF (CONGESTIVE HEART FAILURE): ICD-10-CM

## 2023-07-31 LAB
BH CV ECHO MEAS - AO MAX PG: 9 MMHG
BH CV ECHO MEAS - AO MEAN PG: 5 MMHG
BH CV ECHO MEAS - AO ROOT DIAM: 2.5 CM
BH CV ECHO MEAS - AO V2 MAX: 150 CM/SEC
BH CV ECHO MEAS - AO V2 VTI: 36.7 CM
BH CV ECHO MEAS - AVA(I,D): 2.23 CM2
BH CV ECHO MEAS - EDV(CUBED): 91.1 ML
BH CV ECHO MEAS - EDV(MOD-SP2): 61.1 ML
BH CV ECHO MEAS - EDV(MOD-SP4): 95.7 ML
BH CV ECHO MEAS - EF(MOD-BP): 60.9 %
BH CV ECHO MEAS - EF(MOD-SP2): 60.7 %
BH CV ECHO MEAS - EF(MOD-SP4): 57.9 %
BH CV ECHO MEAS - EF_3D-VOL: 59 %
BH CV ECHO MEAS - ESV(CUBED): 29.8 ML
BH CV ECHO MEAS - ESV(MOD-SP2): 24 ML
BH CV ECHO MEAS - ESV(MOD-SP4): 40.3 ML
BH CV ECHO MEAS - FS: 31.1 %
BH CV ECHO MEAS - IVS/LVPW: 1 CM
BH CV ECHO MEAS - IVSD: 1 CM
BH CV ECHO MEAS - LA DIMENSION: 3.9 CM
BH CV ECHO MEAS - LAT PEAK E' VEL: 10.2 CM/SEC
BH CV ECHO MEAS - LV DIASTOLIC VOL/BSA (35-75): 50.9 CM2
BH CV ECHO MEAS - LV MASS(C)D: 153.3 GRAMS
BH CV ECHO MEAS - LV MAX PG: 3.6 MMHG
BH CV ECHO MEAS - LV MEAN PG: 2 MMHG
BH CV ECHO MEAS - LV SYSTOLIC VOL/BSA (12-30): 21.4 CM2
BH CV ECHO MEAS - LV V1 MAX: 95 CM/SEC
BH CV ECHO MEAS - LV V1 VTI: 26 CM
BH CV ECHO MEAS - LVIDD: 4.5 CM
BH CV ECHO MEAS - LVIDS: 3.1 CM
BH CV ECHO MEAS - LVOT AREA: 3.1 CM2
BH CV ECHO MEAS - LVOT DIAM: 2 CM
BH CV ECHO MEAS - LVPWD: 1 CM
BH CV ECHO MEAS - MED PEAK E' VEL: 5.4 CM/SEC
BH CV ECHO MEAS - MV A MAX VEL: 88 CM/SEC
BH CV ECHO MEAS - MV DEC SLOPE: 379 CM/SEC2
BH CV ECHO MEAS - MV DEC TIME: 0.22 MSEC
BH CV ECHO MEAS - MV E MAX VEL: 83.8 CM/SEC
BH CV ECHO MEAS - MV E/A: 0.95
BH CV ECHO MEAS - MV MAX PG: 3.4 MMHG
BH CV ECHO MEAS - MV MEAN PG: 1 MMHG
BH CV ECHO MEAS - MV V2 VTI: 32.7 CM
BH CV ECHO MEAS - MVA(VTI): 2.5 CM2
BH CV ECHO MEAS - PA ACC TIME: 0.14 SEC
BH CV ECHO MEAS - PA V2 MAX: 110.5 CM/SEC
BH CV ECHO MEAS - RAP SYSTOLE: 3 MMHG
BH CV ECHO MEAS - RVSP: 28 MMHG
BH CV ECHO MEAS - SI(MOD-SP2): 19.7 ML/M2
BH CV ECHO MEAS - SI(MOD-SP4): 29.5 ML/M2
BH CV ECHO MEAS - SV(LVOT): 81.7 ML
BH CV ECHO MEAS - SV(MOD-SP2): 37.1 ML
BH CV ECHO MEAS - SV(MOD-SP4): 55.4 ML
BH CV ECHO MEAS - TAPSE (>1.6): 2.4 CM
BH CV ECHO MEAS - TR MAX PG: 25.1 MMHG
BH CV ECHO MEAS - TR MAX VEL: 250.2 CM/SEC
BH CV ECHO MEASUREMENTS AVERAGE E/E' RATIO: 10.74
BH CV VAS BP RIGHT ARM: NORMAL MMHG
BH CV XLRA - RV BASE: 3.1 CM
BH CV XLRA - RV LENGTH: 6.3 CM
BH CV XLRA - RV MID: 2.7 CM
BH CV XLRA - TDI S': 12.4 CM/SEC
LEFT ATRIUM VOLUME INDEX: 26.4 ML/M2

## 2023-07-31 PROCEDURE — 93306 TTE W/DOPPLER COMPLETE: CPT

## 2023-07-31 PROCEDURE — 93306 TTE W/DOPPLER COMPLETE: CPT | Performed by: INTERNAL MEDICINE

## 2023-08-01 ENCOUNTER — TELEPHONE (OUTPATIENT)
Dept: OBSTETRICS AND GYNECOLOGY | Facility: CLINIC | Age: 38
End: 2023-08-01
Payer: COMMERCIAL

## 2023-08-30 ENCOUNTER — OFFICE VISIT (OUTPATIENT)
Dept: OBSTETRICS AND GYNECOLOGY | Facility: CLINIC | Age: 38
End: 2023-08-30
Payer: COMMERCIAL

## 2023-08-30 VITALS
HEIGHT: 64 IN | DIASTOLIC BLOOD PRESSURE: 80 MMHG | SYSTOLIC BLOOD PRESSURE: 122 MMHG | WEIGHT: 179.2 LBS | BODY MASS INDEX: 30.59 KG/M2

## 2023-08-30 DIAGNOSIS — R87.612 LGSIL ON PAP SMEAR OF CERVIX: Primary | ICD-10-CM

## 2023-08-30 NOTE — PROGRESS NOTES
Colposcopy Procedure Note        Procedures    Indications: Karen Garnica is a 38 y.o. female, , whose Patient's last menstrual period was 2023..  She presents for follow up for evaluation of an abnormal PAP smear that showed low-grade squamous intraepithelial neoplasia (LGSIL - encompassing HPV,mild dysplasia,FELICIA I). Prior cervical treatment includes: no treatment. She understands the need for the procedure and is aware of the complications, including post-colposcopic vaginal bleeding, vaginal leukorrhea or cervicitis.  She is aware she may experience discomfort.  After being presented with the risk, benefits, and alternatives the patient wished to proceed.      Urine pregnancy test done in the office today was Negative.      The patient has had Gardasil.  She is not a smoker.      Procedure Details   The risks and benefits of the procedure and Verbal informed consent obtained.    She was positioned in the dorsal lithotomy position and a speculum was inserted into the vagina and excellent visualization of cervix achieved, cervix swabbed x 3 with acetic acid solution. The transformation zone was completely visualized.  A cervical biopsy was obtained at 12 o'clock.  An endocervical curettage was performed.  This colposcopy was satisfactory.     Findings:  The procedure was notable for:  Physical Exam  Vitals reviewed. Exam conducted with a chaperone present.   Constitutional:       Appearance: Normal appearance.   Genitourinary:     General: Normal vulva.      Vagina: Normal.      Cervix: Normal.            Comments: AW changes at 12 oclock  Skin:     General: Skin is warm and dry.   Neurological:      Mental Status: She is alert and oriented to person, place, and time.   Psychiatric:         Mood and Affect: Mood normal.         Behavior: Behavior normal.       Specimens: cervical biopsy  Specimens labelled and sent to Pathology.    Complications: none.    The patient tolerated the procedure  very well and with mild discomfort and bleeding.    Assessment and Plan    Problem List Items Addressed This Visit    None  Visit Diagnoses       LGSIL on Pap smear of cervix    -  Primary    Relevant Orders    TISSUE EXAM, [facility] ([facility])            Will base further treatment on Pathology findings.  Treatment options discussed with patient.  Post biopsy instructions given to patient.  Repeat PAP in 6 months.     Karlos Dias MD  08/30/2023

## 2023-08-31 LAB — REF LAB TEST METHOD: NORMAL

## 2023-09-05 ENCOUNTER — TELEPHONE (OUTPATIENT)
Dept: OBSTETRICS AND GYNECOLOGY | Facility: CLINIC | Age: 38
End: 2023-09-05
Payer: COMMERCIAL

## 2023-09-05 NOTE — TELEPHONE ENCOUNTER
Reviewed osiris Boles needs LEEP. She said she is off tomorrow and would like to be scheduled. Reviewed having driving just in case she feels dizzy.

## 2023-09-05 NOTE — TELEPHONE ENCOUNTER
Patient wants to go over labs, Patient seen on [facility] came back showing cancer on her cervix.

## 2023-09-06 ENCOUNTER — OFFICE VISIT (OUTPATIENT)
Dept: OBSTETRICS AND GYNECOLOGY | Facility: CLINIC | Age: 38
End: 2023-09-06
Payer: COMMERCIAL

## 2023-09-06 ENCOUNTER — TELEPHONE (OUTPATIENT)
Dept: OBSTETRICS AND GYNECOLOGY | Facility: CLINIC | Age: 38
End: 2023-09-06

## 2023-09-06 VITALS
BODY MASS INDEX: 29.91 KG/M2 | DIASTOLIC BLOOD PRESSURE: 84 MMHG | SYSTOLIC BLOOD PRESSURE: 122 MMHG | HEIGHT: 64 IN | WEIGHT: 175.2 LBS

## 2023-09-06 DIAGNOSIS — R87.613 HIGH GRADE SQUAMOUS INTRAEPITHELIAL LESION (HGSIL) ON CYTOLOGIC SMEAR OF CERVIX: Primary | ICD-10-CM

## 2023-09-06 DIAGNOSIS — Z98.890 STATUS POST LEEP (LOOP ELECTROSURGICAL EXCISION PROCEDURE) OF CERVIX: ICD-10-CM

## 2023-09-06 DIAGNOSIS — Z76.89 ENCOUNTER FOR BIOPSY: ICD-10-CM

## 2023-09-06 RX ORDER — HYDROCODONE BITARTRATE AND ACETAMINOPHEN 5; 325 MG/1; MG/1
1 TABLET ORAL EVERY 6 HOURS PRN
Qty: 12 TABLET | Refills: 0 | Status: SHIPPED | OUTPATIENT
Start: 2023-09-06

## 2023-09-06 NOTE — PROGRESS NOTES
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[place], [place]     PROBLEM LIST:   Preeclampsia  Postpartum pulmonary edema  proBNP 1,000  Echo 6/6/23: EF 50%, LVH noted, mild-mod MR, RVSP >55 mmHg  Echo 7/31/23: EF 56-60%, grade I LV IR, mild MR, RVSP 28  G5, P4  Postpartum Anemia           CC:   Chief Complaint   Patient presents with    Congestive Heart Failure       Allergies  No Known Allergies    Current Medications    Current Outpatient Medications:     ferrous sulfate 325 (65 FE) MG tablet, Take 1 tablet by mouth Daily With Breakfast., Disp: 60 tablet, Rfl: 1    fish oil-omega-3 fatty acids 1000 MG capsule, Take  by mouth., Disp: , Rfl:     HYDROcodone-acetaminophen (Norco) 5-325 MG per tablet, Take 1 tablet by mouth Every 6 (Six) Hours As Needed for Moderate Pain., Disp: 12 tablet, Rfl: 0    nitrofurantoin, macrocrystal-monohydrate, (MACROBID) 100 MG capsule, Take 1 capsule by mouth 2 (Two) Times a Day., Disp: , Rfl:     Prenatal Vit-Fe Fumarate-FA (Prenatal Vitamin) 27-0.8 MG tablet, Take 1 tablet by mouth Daily., Disp: 30 tablet, Rfl: 11      History of Present Illness   Karen Garnica is a 38 y.o. year old female here for hospital follow-up.  We saw the patient due to pulmonary edema with bilateral pleural effusions 4 days postpartum in the setting of preeclampsia and anemia while admitted to [facility].  We started her on metoprolol succinate with plans for weaning off.  Echo showed mild to moderate MR with signs of pulmonary hypertension all in the setting of mild anemia.  She was given some IV Lasix and scheduled for follow-up echo supposed to be done 3 months following admission.  It was done just under 2 months after admission but showed  "significant improvement especially in estimated pulmonary pressures.    Pt denies any chest pain, dyspnea, dyspnea on exertion, orthopnea, PND, palpitations, lower extremity edema, or claudication.      OBJECTIVE:  Vitals:    09/08/23 1440   BP: 136/86   BP Location: Right arm   Patient Position: Sitting   Pulse: 72   SpO2: 98%   Weight: 80.3 kg (177 lb)   Height: 162.6 cm (64\")     Body mass index is 30.38 kg/m².    Constitutional:       Appearance: Healthy appearance. Not in distress.   Pulmonary:      Effort: Pulmonary effort is normal.      Breath sounds: Normal breath sounds. No wheezing. No rhonchi. No rales.   Chest:      Chest wall: Not tender to palpatation.   Cardiovascular:      PMI at left midclavicular line. Normal rate. Regular rhythm. Normal S1. Normal S2.       Murmurs: There is no murmur.      No gallop.  No click. No rub.   Pulses:     Intact distal pulses.   Edema:     Peripheral edema absent.   Skin:     General: Skin is warm and dry.   Neurological:      General: No focal deficit present.      Mental Status: Alert and oriented to person, place and time.       Diagnostic Data:  Procedures    Karen Garnica  reports that she has never smoked. She has never been exposed to tobacco smoke. She has never used smokeless tobacco.     Advance Care Planning   ACP discussion was declined by the patient. Patient does not have an advance directive, declines further assistance.         ASSESSMENT:   Diagnosis Plan   1. Acute pulmonary edema        2. Essential hypertension            PLAN:  Postpartum pulmonary edema- Resolved. Echocardiogram during admission showed mild to moderate MR with elevated estimated pulmonary pressures. Repeat echo 6 weeks later with significant improvement. If patient wants to pursue another pregnancy, she needs to discuss with her OB with close monitoring. She has about a 50% chance of recurrence of gestational hypertension and acute heart failure.     Hypertension- Her " readings have improved but she still has some readings with systolic BPS >140 sporadically. BP acceptable in office today at 136/86. Patient needs to take blood pressure and log for 2 weeks and follow up with PCP for management.     No further indication for cardiology follow ups. Patient can follow up as needed if problems arise.      Electronically signed by Filipe May PA-C, 09/08/23, 2:58 PM EDT.

## 2023-09-06 NOTE — PROGRESS NOTES
LEEP Procedure Note        LEEP Procedure Note  Procedures    Indications: HSIL CIN2 showed on colposcopy performed 23    Karen Garnica is a 38 y.o. female, , who presentstoday for LEEP.  She recently underwent a colposcopy on 23 due to LSIL and HPV pool + on pap smear 23. Her pathology revealed CERVIX, BIOPSY, 12:00:  High-grade squamous intraepithelial lesion (HSIL, FELICIA-2, moderate squamous dysplasia) with HPV effect  Transformation zone is present.  After being presented with the risks, benefits, and alternatives the patient wished to proceed.  Verbal informed consent obtained.    Patient would like to discuss a hysterectomy.     Procedure Details   The risks and benefits of the procedure and Verbal informed consent obtained.    A coated speculum was placed in the vagina and excellent visualization of cervix achieved.  Cervix swabbed with Lugol's Solution.  Once transformation zone identified in entirety, 10 cc of 1% lidocaine with epi used to infiltrate the cervical bed.  Appropriate sized loop was selected and using the cutting setting, I removed the diseased area in 2 pass(es) of the device.    Hemostasis was achieved using the cautery roller ball and Monsel's solution with excellent results.     Tolerated well  No apparent complication     Findings:   Physical Exam  Vitals reviewed. Exam conducted with a chaperone present.   Constitutional:       Appearance: Normal appearance. She is normal weight.   HENT:      Head: Normocephalic and atraumatic.   Abdominal:      General: Abdomen is flat. Bowel sounds are normal.      Palpations: Abdomen is soft.   Genitourinary:     General: Normal vulva.      Vagina: Normal.            Comments: LEEP performed  Skin:     General: Skin is warm and dry.   Neurological:      Mental Status: She is alert and oriented to person, place, and time.   Psychiatric:         Mood and Affect: Mood normal.         Behavior: Behavior normal.        Specimens: LEEP biopsy of cervix    Complications: none.    Assessment and Plan    Problem List Items Addressed This Visit    None  Visit Diagnoses       High grade squamous intraepithelial lesion (HGSIL) on cytologic smear of cervix    -  Primary    Relevant Orders    TISSUE EXAM, [facility] (CLYDE,COR,[facility]AD)    Encounter for biopsy        Relevant Orders    TISSUE EXAM, [facility] (CLYDE,COR,[facility]AD)    Status post LEEP (loop electrosurgical excision procedure) of cervix        Relevant Medications    HYDROcodone-acetaminophen (Norco) 5-325 MG per tablet            1) Send specimen for pathology.  2) Follow up will be based on ASCCP guidelines, but at minimum expect pap at 12 & 24 months.   3) Post procedure expectations and warning signs reviewed.  4) Anticipate results by the next week, call if have not heard by then.     Karlos Dias MD  09/06/2023

## 2023-09-06 NOTE — TELEPHONE ENCOUNTER
Caller: Karen Garnica    Relationship: Self    Best call back number: [phone] CALL ANYTIME,IT IS OKAY TO [facility].     What form or medical record are you requesting: RETURN TO WORK NOTE    Who is requesting this form or medical record from you: PATIENT/EMPLOYER    How would you like to receive the form or medical records (pick-up, mail, fax): [employer]    Timeframe paperwork needed: AS SOON AS POSSIBLE. EMPLOYER IS REQUESTING DOCUMENT TURNED IN BY THE END OF TODAY.    Additional notes: PATIENT HAD LEEP PERFORMED BY DR MCCRAY TODAY, 09/06/23. PATIENT IS REQUESTING TO WORK REMOTELY FROM HOME FOR THE REST OF THIS WEEK AND RETURN TO OFFICE MONDAY 09/11/23. EMPLOYER NEEDS MEDICAL STATEMENT REQUESTING THAT PATIENT WORKS REMOTELY UNTIL 09/11/23.

## 2023-09-07 LAB — REF LAB TEST METHOD: NORMAL

## 2023-09-08 ENCOUNTER — OFFICE VISIT (OUTPATIENT)
Dept: CARDIOLOGY | Facility: CLINIC | Age: 38
End: 2023-09-08
Payer: COMMERCIAL

## 2023-09-08 VITALS
HEIGHT: 64 IN | BODY MASS INDEX: 30.22 KG/M2 | OXYGEN SATURATION: 98 % | WEIGHT: 177 LBS | SYSTOLIC BLOOD PRESSURE: 136 MMHG | HEART RATE: 72 BPM | DIASTOLIC BLOOD PRESSURE: 86 MMHG

## 2023-09-08 DIAGNOSIS — I10 ESSENTIAL HYPERTENSION: ICD-10-CM

## 2023-09-08 DIAGNOSIS — J81.0 ACUTE PULMONARY EDEMA: Primary | ICD-10-CM

## 2023-09-08 RX ORDER — NITROFURANTOIN 25; 75 MG/1; MG/1
100 CAPSULE ORAL 2 TIMES DAILY
COMMUNITY
Start: 2023-09-05 | End: 2023-09-10

## 2023-09-13 DIAGNOSIS — N89.8 VAGINAL ODOR: Primary | ICD-10-CM

## 2023-09-13 RX ORDER — METRONIDAZOLE 500 MG/1
500 TABLET ORAL 2 TIMES DAILY
Qty: 14 TABLET | Refills: 0 | Status: SHIPPED | OUTPATIENT
Start: 2023-09-13 | End: 2023-09-20

## 2023-09-20 ENCOUNTER — OFFICE VISIT (OUTPATIENT)
Dept: OBSTETRICS AND GYNECOLOGY | Facility: CLINIC | Age: 38
End: 2023-09-20
Payer: COMMERCIAL

## 2023-09-20 VITALS
BODY MASS INDEX: 31 KG/M2 | DIASTOLIC BLOOD PRESSURE: 76 MMHG | WEIGHT: 181.6 LBS | SYSTOLIC BLOOD PRESSURE: 122 MMHG | HEIGHT: 64 IN

## 2023-09-20 DIAGNOSIS — R87.613 HSIL (HIGH GRADE SQUAMOUS INTRAEPITHELIAL LESION) ON PAP SMEAR OF CERVIX: ICD-10-CM

## 2023-09-20 DIAGNOSIS — Z48.89 POSTOPERATIVE VISIT: Primary | ICD-10-CM

## 2023-09-20 PROCEDURE — 99024 POSTOP FOLLOW-UP VISIT: CPT | Performed by: OBSTETRICS & GYNECOLOGY

## 2023-09-20 NOTE — PROGRESS NOTES
"     OBGYN Postoperative Exam Note          Subjective   Chief Complaint   Patient presents with    Post-op     LilyTori Garnica is a 38 y.o. year old  presenting to be seen for her post-operative visit. She is S/P LEEP on 2023 at our office.for  HGSIL/FELICIA 2 . Currently she reports no problems with eating, bowel movements, voiding, or wound drainage and pain is well controlled.    The results have previously been discussed with Karen.    OTHER THINGS SHE WANTS TO DISCUSS TODAY:  Nothing else      Current Outpatient Medications:     ferrous sulfate 325 (65 FE) MG tablet, Take 1 tablet by mouth Daily With Breakfast., Disp: 60 tablet, Rfl: 1    fish oil-omega-3 fatty acids 1000 MG capsule, Take  by mouth., Disp: , Rfl:     HYDROcodone-acetaminophen (Norco) 5-325 MG per tablet, Take 1 tablet by mouth Every 6 (Six) Hours As Needed for Moderate Pain., Disp: 12 tablet, Rfl: 0    Prenatal Vit-Fe Fumarate-FA (Prenatal Vitamin) 27-0.8 MG tablet, Take 1 tablet by mouth Daily., Disp: 30 tablet, Rfl: 11     Past Medical History:   Diagnosis Date    Abnormal Pap smear of cervix 2023    LSIL, HPV +, followed by colposcopy    Anemia 2022    Anxiety     Chronic hypertension     Gestational diabetes     Gestational hypertension     History of pre-eclampsia 2015    History of recurrent UTIs     Pulmonary edema 2015        Past Surgical History:   Procedure Laterality Date    LEEP      NO PAST SURGERIES         The following portions of the patient's history were reviewed and updated as appropriate:current medications and allergies    Review of Systems   Constitutional: Negative.    Respiratory: Negative.     Cardiovascular: Negative.    Gastrointestinal: Negative.    Genitourinary: Negative.    Musculoskeletal: Negative.    Neurological: Negative.    Psychiatric/Behavioral: Negative.          Objective   /76   Ht 162.6 cm (64\")   Wt 82.4 kg (181 lb 9.6 oz)   LMP 2023   Breastfeeding Yes "   BMI 31.17 kg/m²     Physical Exam  Vitals reviewed. Exam conducted with a chaperone present.   Constitutional:       Appearance: Normal appearance. She is normal weight.   HENT:      Head: Normocephalic and atraumatic.   Genitourinary:     General: Normal vulva.      Vagina: Normal.      Cervix: Normal.      Uterus: Normal.       Adnexa: Right adnexa normal and left adnexa normal.            Comments: LEEP site healing well  Skin:     General: Skin is warm and dry.   Neurological:      Mental Status: She is alert and oriented to person, place, and time.   Psychiatric:         Mood and Affect: Mood normal.         Behavior: Behavior normal.            Assessment   S/P LEEP     Plan   May return to full activity with no restrictions  The importance of keeping all planned follow-up and taking all medications as prescribed was emphasized.  Return in about 6 months (around 3/20/2024) for Repeat pap smear.              Karlos Dias MD  09/20/2023

## 2023-09-21 PROBLEM — R87.613 HSIL (HIGH GRADE SQUAMOUS INTRAEPITHELIAL LESION) ON PAP SMEAR OF CERVIX: Status: ACTIVE | Noted: 2023-09-21

## 2023-10-17 ENCOUNTER — TELEPHONE (OUTPATIENT)
Dept: OBSTETRICS AND GYNECOLOGY | Facility: CLINIC | Age: 38
End: 2023-10-17
Payer: COMMERCIAL

## 2023-10-17 NOTE — TELEPHONE ENCOUNTER
----- Message from Keesha Hutson sent at 10/17/2023  9:31 AM EDT -----  Regarding: FW: Appointment Request  Contact: [phone]    ----- Message -----  From: Karen Garnica  Sent: 10/16/2023   4:09 PM EDT  To: [address]  Pool  Subject: Appointment Request                              I’m 38 years old. I’d know if this was a period. It only happens after intercourse. 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 I need to see the doctor something is not right.

## 2023-12-08 ENCOUNTER — TRANSCRIBE ORDERS (OUTPATIENT)
Dept: LAB | Facility: HOSPITAL | Age: 38
End: 2023-12-08
Payer: COMMERCIAL

## 2023-12-08 ENCOUNTER — LAB (OUTPATIENT)
Dept: LAB | Facility: HOSPITAL | Age: 38
End: 2023-12-08
Payer: COMMERCIAL

## 2023-12-08 DIAGNOSIS — Z32.01 PREGNANCY EXAMINATION OR TEST, POSITIVE RESULT: Primary | ICD-10-CM

## 2023-12-08 DIAGNOSIS — Z32.01 PREGNANCY EXAMINATION OR TEST, POSITIVE RESULT: ICD-10-CM

## 2023-12-08 LAB
ABO GROUP BLD: NORMAL
ALT SERPL W P-5'-P-CCNC: 34 U/L (ref 1–33)
AMPHET+METHAMPHET UR QL: NEGATIVE
AMPHETAMINES UR QL: NEGATIVE
AST SERPL-CCNC: 26 U/L (ref 1–32)
BACTERIA UR QL AUTO: ABNORMAL /HPF
BARBITURATES UR QL SCN: NEGATIVE
BASOPHILS # BLD AUTO: 0.03 10*3/MM3 (ref 0–0.2)
BASOPHILS NFR BLD AUTO: 0.4 % (ref 0–1.5)
BENZODIAZ UR QL SCN: NEGATIVE
BILIRUB UR QL STRIP: NEGATIVE
BLD GP AB SCN SERPL QL: NEGATIVE
BUN SERPL-MCNC: 15 MG/DL (ref 6–20)
BUPRENORPHINE SERPL-MCNC: NEGATIVE NG/ML
CANNABINOIDS SERPL QL: NEGATIVE
CLARITY UR: CLEAR
COCAINE UR QL: NEGATIVE
COLOR UR: YELLOW
CREAT SERPL-MCNC: 0.76 MG/DL (ref 0.57–1)
DEPRECATED RDW RBC AUTO: 39.9 FL (ref 37–54)
EGFRCR SERPLBLD CKD-EPI 2021: 103.6 ML/MIN/1.73
EOSINOPHIL # BLD AUTO: 0.05 10*3/MM3 (ref 0–0.4)
EOSINOPHIL NFR BLD AUTO: 0.7 % (ref 0.3–6.2)
ERYTHROCYTE [DISTWIDTH] IN BLOOD BY AUTOMATED COUNT: 12.2 % (ref 12.3–15.4)
FENTANYL UR-MCNC: NEGATIVE NG/ML
GLUCOSE UR STRIP-MCNC: NEGATIVE MG/DL
HBV SURFACE AG SERPL QL IA: NORMAL
HCT VFR BLD AUTO: 35.5 % (ref 34–46.6)
HCV AB SER DONR QL: NORMAL
HGB BLD-MCNC: 12.3 G/DL (ref 12–15.9)
HGB UR QL STRIP.AUTO: ABNORMAL
HIV 1+2 AB+HIV1 P24 AG SERPL QL IA: NORMAL
HYALINE CASTS UR QL AUTO: ABNORMAL /LPF
IMM GRANULOCYTES # BLD AUTO: 0.02 10*3/MM3 (ref 0–0.05)
IMM GRANULOCYTES NFR BLD AUTO: 0.3 % (ref 0–0.5)
KETONES UR QL STRIP: ABNORMAL
LDH SERPL-CCNC: 168 U/L (ref 135–214)
LEUKOCYTE ESTERASE UR QL STRIP.AUTO: NEGATIVE
LYMPHOCYTES # BLD AUTO: 2.66 10*3/MM3 (ref 0.7–3.1)
LYMPHOCYTES NFR BLD AUTO: 39.3 % (ref 19.6–45.3)
MCH RBC QN AUTO: 31.4 PG (ref 26.6–33)
MCHC RBC AUTO-ENTMCNC: 34.6 G/DL (ref 31.5–35.7)
MCV RBC AUTO: 90.6 FL (ref 79–97)
METHADONE UR QL SCN: NEGATIVE
MONOCYTES # BLD AUTO: 0.39 10*3/MM3 (ref 0.1–0.9)
MONOCYTES NFR BLD AUTO: 5.8 % (ref 5–12)
NEUTROPHILS NFR BLD AUTO: 3.62 10*3/MM3 (ref 1.7–7)
NEUTROPHILS NFR BLD AUTO: 53.5 % (ref 42.7–76)
NITRITE UR QL STRIP: POSITIVE
NRBC BLD AUTO-RTO: 0 /100 WBC (ref 0–0.2)
OPIATES UR QL: NEGATIVE
OXYCODONE UR QL SCN: NEGATIVE
PCP UR QL SCN: NEGATIVE
PH UR STRIP.AUTO: 5.5 [PH] (ref 5–8)
PLATELET # BLD AUTO: 258 10*3/MM3 (ref 140–450)
PMV BLD AUTO: 11.6 FL (ref 6–12)
PROT UR QL STRIP: NEGATIVE
RBC # BLD AUTO: 3.92 10*6/MM3 (ref 3.77–5.28)
RBC # UR STRIP: ABNORMAL /HPF
REF LAB TEST METHOD: ABNORMAL
RH BLD: POSITIVE
SP GR UR STRIP: 1.03 (ref 1–1.03)
SQUAMOUS #/AREA URNS HPF: ABNORMAL /HPF
TRICYCLICS UR QL SCN: NEGATIVE
URATE SERPL-MCNC: 4.2 MG/DL (ref 2.4–5.7)
UROBILINOGEN UR QL STRIP: ABNORMAL
WBC # UR STRIP: ABNORMAL /HPF
WBC NRBC COR # BLD AUTO: 6.77 10*3/MM3 (ref 3.4–10.8)

## 2023-12-08 PROCEDURE — 87340 HEPATITIS B SURFACE AG IA: CPT

## 2023-12-08 PROCEDURE — 84460 ALANINE AMINO (ALT) (SGPT): CPT

## 2023-12-08 PROCEDURE — 80307 DRUG TEST PRSMV CHEM ANLYZR: CPT

## 2023-12-08 PROCEDURE — 81001 URINALYSIS AUTO W/SCOPE: CPT

## 2023-12-08 PROCEDURE — 84450 TRANSFERASE (AST) (SGOT): CPT

## 2023-12-08 PROCEDURE — G0432 EIA HIV-1/HIV-2 SCREEN: HCPCS

## 2023-12-08 PROCEDURE — 87591 N.GONORRHOEAE DNA AMP PROB: CPT

## 2023-12-08 PROCEDURE — 87186 SC STD MICRODIL/AGAR DIL: CPT

## 2023-12-08 PROCEDURE — 87491 CHLMYD TRACH DNA AMP PROBE: CPT

## 2023-12-08 PROCEDURE — 36415 COLL VENOUS BLD VENIPUNCTURE: CPT

## 2023-12-08 PROCEDURE — 85025 COMPLETE CBC W/AUTO DIFF WBC: CPT

## 2023-12-08 PROCEDURE — 86762 RUBELLA ANTIBODY: CPT

## 2023-12-08 PROCEDURE — 87086 URINE CULTURE/COLONY COUNT: CPT

## 2023-12-08 PROCEDURE — 86803 HEPATITIS C AB TEST: CPT

## 2023-12-08 PROCEDURE — 84156 ASSAY OF PROTEIN URINE: CPT

## 2023-12-08 PROCEDURE — 83615 LACTATE (LD) (LDH) ENZYME: CPT

## 2023-12-08 PROCEDURE — 86850 RBC ANTIBODY SCREEN: CPT

## 2023-12-08 PROCEDURE — 86780 TREPONEMA PALLIDUM: CPT

## 2023-12-08 PROCEDURE — 87088 URINE BACTERIA CULTURE: CPT

## 2023-12-08 PROCEDURE — 82570 ASSAY OF URINE CREATININE: CPT

## 2023-12-08 PROCEDURE — 84520 ASSAY OF UREA NITROGEN: CPT

## 2023-12-08 PROCEDURE — 84550 ASSAY OF BLOOD/URIC ACID: CPT

## 2023-12-08 PROCEDURE — 86900 BLOOD TYPING SEROLOGIC ABO: CPT

## 2023-12-08 PROCEDURE — 82565 ASSAY OF CREATININE: CPT

## 2023-12-08 PROCEDURE — 86901 BLOOD TYPING SEROLOGIC RH(D): CPT

## 2023-12-09 LAB
CREAT UR-MCNC: 115.3 MG/DL
PROT ?TM UR-MCNC: 9.3 MG/DL

## 2023-12-10 LAB — RUBV IGG SERPL IA-ACNC: 5.82 INDEX

## 2023-12-11 ENCOUNTER — TRANSCRIBE ORDERS (OUTPATIENT)
Dept: OBSTETRICS AND GYNECOLOGY | Facility: HOSPITAL | Age: 38
End: 2023-12-11
Payer: COMMERCIAL

## 2023-12-11 ENCOUNTER — TELEPHONE (OUTPATIENT)
Dept: CARDIOLOGY | Facility: CLINIC | Age: 38
End: 2023-12-11
Payer: COMMERCIAL

## 2023-12-11 DIAGNOSIS — Z86.79 HISTORY OF CARDIOMYOPATHY: ICD-10-CM

## 2023-12-11 DIAGNOSIS — O09.299 HX OF PREECLAMPSIA, PRIOR PREGNANCY, CURRENTLY PREGNANT: ICD-10-CM

## 2023-12-11 DIAGNOSIS — O30.041 DICHORIONIC DIAMNIOTIC TWIN PREGNANCY IN FIRST TRIMESTER: Primary | ICD-10-CM

## 2023-12-11 DIAGNOSIS — O09.521 MULTIGRAVIDA OF ADVANCED MATERNAL AGE IN FIRST TRIMESTER: ICD-10-CM

## 2023-12-11 DIAGNOSIS — Z34.90 PREGNANCY, UNSPECIFIED GESTATIONAL AGE: ICD-10-CM

## 2023-12-11 LAB
BACTERIA SPEC AEROBE CULT: ABNORMAL
TREPONEMA PALLIDUM IGG+IGM AB [PRESENCE] IN SERUM OR PLASMA BY IMMUNOASSAY: NON REACTIVE

## 2023-12-11 NOTE — TELEPHONE ENCOUNTER
Caller: Karen Garnica    Relationship to patient: Self    Best call back number: [phone]     Type of visit: FOLLOW UP     Requested date: ASAP     Additional notes:PATIENT CALLED IN TO SEE ABOUT GETTING A FOLLOW UP APPOINTMENT WITH DR. HEADLEY. PATIENT IS CURRENTLY PREGNANT WITH TWINS AND WAS ADVISED TO FOLLOW UP TO GET A PLAN OF CARE AS TO HOW OFTEN SHE NEEDS TO HAVE AN ECHO DONE BY DR. KHANNA AT [facility].     PATIENT IS NOT CURRENTLY HAVING ANY CARDIAC ISSUES

## 2023-12-13 LAB
C TRACH RRNA SPEC QL NAA+PROBE: NEGATIVE
N GONORRHOEA RRNA SPEC QL NAA+PROBE: NEGATIVE

## 2023-12-21 ENCOUNTER — OFFICE VISIT (OUTPATIENT)
Dept: CARDIOLOGY | Facility: CLINIC | Age: 38
End: 2023-12-21
Payer: COMMERCIAL

## 2023-12-21 VITALS
SYSTOLIC BLOOD PRESSURE: 124 MMHG | HEIGHT: 64 IN | HEART RATE: 98 BPM | OXYGEN SATURATION: 97 % | WEIGHT: 168 LBS | BODY MASS INDEX: 28.68 KG/M2 | DIASTOLIC BLOOD PRESSURE: 74 MMHG

## 2023-12-21 DIAGNOSIS — I34.0 NONRHEUMATIC MITRAL VALVE REGURGITATION: Primary | ICD-10-CM

## 2023-12-21 PROCEDURE — 99213 OFFICE O/P EST LOW 20 MIN: CPT | Performed by: INTERNAL MEDICINE

## 2023-12-21 PROCEDURE — 93000 ELECTROCARDIOGRAM COMPLETE: CPT | Performed by: INTERNAL MEDICINE

## 2023-12-21 RX ORDER — ONDANSETRON 4 MG/1
4 TABLET, ORALLY DISINTEGRATING ORAL AS NEEDED
COMMUNITY
Start: 2023-10-05

## 2023-12-21 RX ORDER — BUSPIRONE HYDROCHLORIDE 10 MG/1
TABLET ORAL
COMMUNITY
End: 2023-12-21

## 2023-12-21 RX ORDER — OSELTAMIVIR PHOSPHATE 75 MG/1
CAPSULE ORAL
COMMUNITY
Start: 2023-10-05 | End: 2023-12-21

## 2023-12-21 NOTE — PROGRESS NOTES
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 Osorio       Allergies  No Known Allergies    Current Medications    Current Outpatient Medications:     ondansetron ODT (ZOFRAN-ODT) 4 MG disintegrating tablet, Take 1 tablet by mouth As Needed., Disp: , Rfl:     Prenatal Vit-Fe Fumarate-FA (Prenatal Vitamin) 27-0.8 MG tablet, Take 1 tablet by mouth Daily., Disp: 30 tablet, Rfl: 11      History of Present Illness   Karen Garnica is a 38 y.o. year old female here for  follow-up.  We saw the patient due to pulmonary edema with bilateral pleural effusions 4 days postpartum in the setting of preeclampsia and anemia while admitted to [facility] in June 2023.  She had followed up with us 3 months of hospitalization with echocardiogram with resolution of her mitral regurgitation.   Feels well but wants to be proactive given her historical significant valvular issues with prior pregnancy    OBJECTIVE:  Vitals:    12/21/23 1343   BP: 124/74   BP Location: Right arm   Patient Position: Sitting   Pulse: 98   SpO2: 97%   Weight: 76.2 kg (168 lb)   Height: 162.6 cm (64\")       Body mass index is 28.84 kg/m².    Constitutional:       Appearance: Healthy appearance. Not in distress.   Pulmonary:      Effort: Pulmonary effort is normal.      " Breath sounds: Normal breath sounds. No wheezing. No rhonchi. No rales.   Chest:      Chest wall: Not tender to palpatation.   Cardiovascular:      PMI at left midclavicular line. Normal rate. Regular rhythm. Normal S1. Normal S2.       Murmurs: There is no murmur.      No gallop.  No click. No rub.   Pulses:     Intact distal pulses.   Edema:     Peripheral edema absent.   Skin:     General: Skin is warm and dry.   Neurological:      General: No focal deficit present.      Mental Status: Alert and oriented to person, place and time.         Diagnostic Data:    ECG 12 Lead    Date/Time: 12/21/2023 1:57 PM  Performed by: Panfilo Mishra MD    Authorized by: Panfilo Mishra MD  Previous ECG: no previous ECG available  Rhythm: sinus rhythm  BPM: 108    Clinical impression: non-specific ECG          Karen Garnica  reports that she has never smoked. She has never been exposed to tobacco smoke. She has never used smokeless tobacco.     Advance Care Planning   ACP discussion was declined by the patient. Patient does not have an advance directive, declines further assistance.         ASSESSMENT:  No diagnosis found.      PLAN:  Postpartum pulmonary edema/mitral regurgitation- Resolved.  Now pregnant for seventh time would document echocardiogram at the end of second trimester

## 2023-12-29 ENCOUNTER — TRANSCRIBE ORDERS (OUTPATIENT)
Dept: LAB | Facility: HOSPITAL | Age: 38
End: 2023-12-29
Payer: COMMERCIAL

## 2023-12-29 ENCOUNTER — LAB (OUTPATIENT)
Dept: LAB | Facility: HOSPITAL | Age: 38
End: 2023-12-29
Payer: COMMERCIAL

## 2023-12-29 DIAGNOSIS — O09.511 SUPERVISION OF ELDERLY PRIMIGRAVIDA IN FIRST TRIMESTER: Primary | ICD-10-CM

## 2023-12-29 DIAGNOSIS — O09.511 SUPERVISION OF ELDERLY PRIMIGRAVIDA IN FIRST TRIMESTER: ICD-10-CM

## 2023-12-29 PROCEDURE — 36415 COLL VENOUS BLD VENIPUNCTURE: CPT

## 2024-01-09 ENCOUNTER — OFFICE VISIT (OUTPATIENT)
Dept: OBSTETRICS AND GYNECOLOGY | Facility: HOSPITAL | Age: 39
End: 2024-01-09
Payer: COMMERCIAL

## 2024-01-09 ENCOUNTER — HOSPITAL ENCOUNTER (OUTPATIENT)
Dept: WOMENS IMAGING | Facility: HOSPITAL | Age: 39
Discharge: HOME | End: 2024-01-09
Admitting: OBSTETRICS & GYNECOLOGY
Payer: COMMERCIAL

## 2024-01-09 VITALS — WEIGHT: 168 LBS | BODY MASS INDEX: 28.84 KG/M2 | SYSTOLIC BLOOD PRESSURE: 134 MMHG | DIASTOLIC BLOOD PRESSURE: 87 MMHG

## 2024-01-09 DIAGNOSIS — O34.40 HISTORY OF LOOP ELECTROSURGICAL EXCISION PROCEDURE (LEEP) OF CERVIX AFFECTING PREGNANCY, ANTEPARTUM: ICD-10-CM

## 2024-01-09 DIAGNOSIS — Z86.32 HISTORY OF GESTATIONAL DIABETES MELLITUS (GDM): ICD-10-CM

## 2024-01-09 DIAGNOSIS — O09.299 HISTORY OF PRE-ECLAMPSIA IN PRIOR PREGNANCY, CURRENTLY PREGNANT: ICD-10-CM

## 2024-01-09 DIAGNOSIS — Z3A.12 12 WEEKS GESTATION OF PREGNANCY: ICD-10-CM

## 2024-01-09 DIAGNOSIS — O30.041 DICHORIONIC DIAMNIOTIC TWIN PREGNANCY IN FIRST TRIMESTER: ICD-10-CM

## 2024-01-09 DIAGNOSIS — I10 ESSENTIAL HYPERTENSION: ICD-10-CM

## 2024-01-09 DIAGNOSIS — O09.899 HISTORY OF MATERNAL CARDIOMYOPATHY, CURRENTLY PREGNANT: ICD-10-CM

## 2024-01-09 DIAGNOSIS — Z34.90 PREGNANCY, UNSPECIFIED GESTATIONAL AGE: ICD-10-CM

## 2024-01-09 DIAGNOSIS — O30.049 DICHORIONIC DIAMNIOTIC TWIN PREGNANCY, ANTEPARTUM: ICD-10-CM

## 2024-01-09 DIAGNOSIS — O09.529 ANTEPARTUM MULTIGRAVIDA OF ADVANCED MATERNAL AGE: Primary | ICD-10-CM

## 2024-01-09 DIAGNOSIS — O09.521 MULTIGRAVIDA OF ADVANCED MATERNAL AGE IN FIRST TRIMESTER: ICD-10-CM

## 2024-01-09 DIAGNOSIS — O09.299 HX OF PREECLAMPSIA, PRIOR PREGNANCY, CURRENTLY PREGNANT: ICD-10-CM

## 2024-01-09 DIAGNOSIS — Z98.890 HISTORY OF LOOP ELECTROSURGICAL EXCISION PROCEDURE (LEEP) OF CERVIX AFFECTING PREGNANCY, ANTEPARTUM: ICD-10-CM

## 2024-01-09 DIAGNOSIS — Z86.79 HISTORY OF CARDIOMYOPATHY: ICD-10-CM

## 2024-01-09 PROBLEM — Z87.59 HISTORY OF GESTATIONAL HYPERTENSION: Status: ACTIVE | Noted: 2022-12-06

## 2024-01-09 PROCEDURE — 76802 OB US < 14 WKS ADDL FETUS: CPT

## 2024-01-09 PROCEDURE — 76814 OB US NUCHAL MEAS ADD-ON: CPT

## 2024-01-09 PROCEDURE — 76801 OB US < 14 WKS SINGLE FETUS: CPT

## 2024-01-09 PROCEDURE — 76813 OB US NUCHAL MEAS 1 GEST: CPT

## 2024-01-09 NOTE — PROGRESS NOTES
Pt has appointment with Dr. Osorio today. NIPT low risk. Pt denies vaginal bleeding, cramping, or loss of fluid.

## 2024-01-09 NOTE — LETTER
"2024     Karen Osorio MD  [address] [facility] [address] [place] [id number]    Patient: Karen Garnica   YOB: 1985   Date of Visit: 2024       Dear Karen Osorio MD    Karen Garnica was in my office today. Below is a copy of my note.    If you have questions, please do not hesitate to call me. I look forward to following Karen along with you.         Sincerely,        Jadyn Matias MD        CC: No Recipients                    Maternal/Fetal Medicine Consult Note     Name: Karen Garnica    : 1985     MRN: [id number]     Referring Provider: Karen Osorio MD    Chief Complaint  Di-Di twins, AMA multip, Hx cardiomyopathy, hx preeclampsia    Subjective     History of Present Illness:  Karen Garnica is a 38 y.o.  12w0d who presents today for dating/viability ultrasound, definitive determination of chorionicity, and discussion of her co-morbidities.     Pt denies VB/cramping.     SUMMER: Estimated Date of Delivery: 24     ROS:   As noted in HPI.     Past Medical History:   Diagnosis Date   • Abnormal Pap smear of cervix 2023    LSIL, HPV +, followed by colposcopy   • Anemia 2022   • Anxiety    • Chronic hypertension    • Gestational diabetes    • Gestational hypertension    • History of pre-eclampsia 2015   • History of pulmonary edema 2015   • History of recurrent UTIs    • Pulmonary edema       Past Surgical History:   Procedure Laterality Date   • LEEP     • NO PAST SURGERIES        OB History          7    Para   4    Term   4       0    AB   2    Living   4         SAB   2    IAB   0    Ectopic   0    Molar   0    Multiple   0    Live Births   4                Objective     Vital Signs  /87   Wt 76.2 kg (168 lb)   LMP 10/17/2023   Estimated body mass index is 28.84 kg/m² as calculated from the following:    Height as of 23: 162.6 cm (64\").    Weight as of this encounter: 76.2 kg (168 " lb).    Physical Exam  Constitutional:       Appearance: Normal appearance. She is normal weight.   HENT:      Head: Normocephalic and atraumatic.   Pulmonary:      Effort: Pulmonary effort is normal.   Musculoskeletal:         General: Normal range of motion.   Neurological:      General: No focal deficit present.      Mental Status: She is alert and oriented to person, place, and time.   Psychiatric:         Mood and Affect: Mood normal.         Behavior: Behavior normal.         Thought Content: Thought content normal.         Judgment: Judgment normal.     Ultrasound Impression:   Viable di/di twin pregnancy with normal NT's x 2 and appropriate appearing early anatomy x 2. Long CL. Low-lying posterior placenta.    Assessment and Plan     Diagnoses and all orders for this visit:    1. Antepartum multigravida of advanced maternal age (Primary)  Assessment & Plan:  S/p low risk NIPT.   Will do Level II anatomy survey and plan on serial growth ultrasounds and  testing in third trimester.       2. Dichorionic diamniotic twin pregnancy, antepartum  Assessment & Plan:  A dichorionic/diamniotic (DCDA) pregnancy is a type of twin gestation where each twin has its own chorionic and amniotic sacs.  DCDA pregnancies account for the majority (~76%) of all twin pregnancies. They account for all dizygotic pregnancies and ~20% of monozygotic pregnancies.  With a dizygotic pregnancy, two ova are independently fertilized by two sperm leading to two zygotes.  With a monozygotic twin pregnancy, a DCDA pregnancy results from the separation of the zygotes at ~1-4 days post fertilization (morula) stage.      Ultrasonographic features supporting a DCDA pregnancy:    First Trimester    Presence of two gestational sacs with a thick echogenic chorion surrounding each embryo  A thick inter-twin membrane  Twin-peak sign  Two yolk sacs may be seen (this, however, does not differentiate a DCDA pregnancy from a  monochorionic/diamniotic (MCDA) pregnancy)    Second Trimester    When there is no placental fusion, two separate placental sites may be seen  A finding of two different genders for each twin is a definitive feature of a dizygotic pregnancy which in turn will invariably mean a DCDA pregnancy.   If chorionicity cannot be determined, the recommendation is to manage as a monochorionic pregnancy until proven otherwise.      While the complication rate is still much higher with twins than a lai pregnancy, a DCDA pregnancy carries the lowest rate of complications amongst twin pregnancies.  Recognized complications:     Increased risk of intrauterine growth restriction (IUGR)  Placenta-related problems  Increased risk of velamentous cord insertion  Increased risk of marginal cord insertion  Increased incidence of placenta previa spectrum  Increased risk of gestational diabetes and preeclampsia    The current recommendation for an uncomplicated DCDA pregnancy is delivery at 38 weeks gestation.        3. Essential hypertension    4. History of gestational diabetes mellitus (GDM)  Assessment & Plan:  Reviewed with patient that has increased risk of GDM in this pregnancy given AMA and twin gestation.    - Recommend testing for GDM in first trimester and if passes then early in the usual window for testing      5. History of loop electrosurgical excision procedure (LEEP) of cervix affecting pregnancy, antepartum  Assessment & Plan:  Patient reports that was done in the time between her last pregnancy and this one.    - Will do serial cervical lengths starting at 16wks      6. History of maternal cardiomyopathy, currently pregnant  Assessment & Plan:  Patient gives a history of atypical preeclampsia with two of her pregnancies where she would get elevated BP's and profound fluid overload/pulmonary edema. She has never had protein in her urine with these episodes, however.     After her last delivery in 6/2023, she had a  "maternal echo that showed an EF of 56-60% but \"Left ventricular diastolic function...consistent with (grade I) impaired relaxation.\" She is followed by Cardiology and has a follow-up appointment scheduled in 4/2024.     I reviewed with the patient that twin pregnancy increases her risk of preeclampsia and of strain on her heart. I recommended she be followed closely by Cardiology, as they appear to be doing.         7. History of pre-eclampsia in prior pregnancy, currently pregnant  Assessment & Plan:  Patient with h/o atypical preeclampsia x 2. See above for si/sx's.      - Recommend patient start a daily ASA 81mgs   - Recommend baseline preeclampsia labs and 24hr urine be collected      8. 12 weeks gestation of pregnancy         Follow Up  Return in about 4 weeks (around 2/6/2024).    I spent 45 minutes caring for the patient on the day of service. This included: obtaining or reviewing a separately obtained medical history, reviewing patient records, performing a medically appropriate exam and/or evaluation, counseling or educating the patient/family/caregiver, ordering medications, labs, and/or procedures and documenting such in the medical record. This does not include time spent on review and interpretation of other tests such as fetal ultrasound or the performance of other procedures such as amniocentesis or CVS.      Jadyn Matias MD  01/09/2024  "

## 2024-01-16 PROBLEM — O09.299 HISTORY OF PRE-ECLAMPSIA IN PRIOR PREGNANCY, CURRENTLY PREGNANT: Status: ACTIVE | Noted: 2024-01-16

## 2024-01-16 PROBLEM — O09.899: Status: ACTIVE | Noted: 2024-01-16

## 2024-01-16 PROBLEM — O30.049 DICHORIONIC DIAMNIOTIC TWIN PREGNANCY, ANTEPARTUM: Status: ACTIVE | Noted: 2024-01-16

## 2024-01-16 PROBLEM — Z98.890 HISTORY OF LOOP ELECTROSURGICAL EXCISION PROCEDURE (LEEP) OF CERVIX AFFECTING PREGNANCY, ANTEPARTUM: Status: ACTIVE | Noted: 2024-01-16

## 2024-01-16 PROBLEM — Z86.32 HISTORY OF GESTATIONAL DIABETES MELLITUS (GDM): Status: ACTIVE | Noted: 2024-01-16

## 2024-01-16 PROBLEM — J81.1 PULMONARY EDEMA: Status: RESOLVED | Noted: 2023-06-06 | Resolved: 2024-01-16

## 2024-01-16 PROBLEM — O09.529 ANTEPARTUM MULTIGRAVIDA OF ADVANCED MATERNAL AGE: Status: ACTIVE | Noted: 2024-01-16

## 2024-01-16 PROBLEM — O34.40 HISTORY OF LOOP ELECTROSURGICAL EXCISION PROCEDURE (LEEP) OF CERVIX AFFECTING PREGNANCY, ANTEPARTUM: Status: ACTIVE | Noted: 2024-01-16

## 2024-01-16 NOTE — ASSESSMENT & PLAN NOTE
Patient reports that was done in the time between her last pregnancy and this one.    - Will do serial cervical lengths starting at 16wks

## 2024-01-16 NOTE — ASSESSMENT & PLAN NOTE
S/p low risk NIPT.   Will do Level II anatomy survey and plan on serial growth ultrasounds and  testing in third trimester.

## 2024-01-16 NOTE — ASSESSMENT & PLAN NOTE
Patient with h/o atypical preeclampsia x 2. See above for si/sx's.      - Recommend patient start a daily ASA 81mgs   - Recommend baseline preeclampsia labs and 24hr urine be collected

## 2024-01-16 NOTE — PROGRESS NOTES
"    Maternal/Fetal Medicine Consult Note     Name: Karen Garnica    : 1985     MRN: [id number]     Referring Provider: Karen Osorio MD    Chief Complaint  Di-Di twins, AMA multip, Hx cardiomyopathy, hx preeclampsia    Subjective     History of Present Illness:  Karen Garnica is a 38 y.o.  12w0d who presents today for dating/viability ultrasound, definitive determination of chorionicity, and discussion of her co-morbidities.     Pt denies VB/cramping.     SUMMER: Estimated Date of Delivery: 24     ROS:   As noted in HPI.     Past Medical History:   Diagnosis Date    Abnormal Pap smear of cervix 2023    LSIL, HPV +, followed by colposcopy    Anemia 2022    Anxiety     Chronic hypertension     Gestational diabetes     Gestational hypertension     History of pre-eclampsia 2015    History of pulmonary edema 2015    History of recurrent UTIs     Pulmonary edema       Past Surgical History:   Procedure Laterality Date    LEEP      NO PAST SURGERIES        OB History          7    Para   4    Term   4       0    AB   2    Living   4         SAB   2    IAB   0    Ectopic   0    Molar   0    Multiple   0    Live Births   4                Objective     Vital Signs  /87   Wt 76.2 kg (168 lb)   LMP 10/17/2023   Estimated body mass index is 28.84 kg/m² as calculated from the following:    Height as of 23: 162.6 cm (64\").    Weight as of this encounter: 76.2 kg (168 lb).    Physical Exam  Constitutional:       Appearance: Normal appearance. She is normal weight.   HENT:      Head: Normocephalic and atraumatic.   Pulmonary:      Effort: Pulmonary effort is normal.   Musculoskeletal:         General: Normal range of motion.   Neurological:      General: No focal deficit present.      Mental Status: She is alert and oriented to person, place, and time.   Psychiatric:         Mood and Affect: Mood normal.         Behavior: Behavior normal.         Thought " Content: Thought content normal.         Judgment: Judgment normal.     Ultrasound Impression:   Viable di/di twin pregnancy with normal NT's x 2 and appropriate appearing early anatomy x 2. Long CL. Low-lying posterior placenta.    Assessment and Plan     Diagnoses and all orders for this visit:    1. Antepartum multigravida of advanced maternal age (Primary)  Assessment & Plan:  S/p low risk NIPT.   Will do Level II anatomy survey and plan on serial growth ultrasounds and  testing in third trimester.       2. Dichorionic diamniotic twin pregnancy, antepartum  Assessment & Plan:  A dichorionic/diamniotic (DCDA) pregnancy is a type of twin gestation where each twin has its own chorionic and amniotic sacs.  DCDA pregnancies account for the majority (~76%) of all twin pregnancies. They account for all dizygotic pregnancies and ~20% of monozygotic pregnancies.  With a dizygotic pregnancy, two ova are independently fertilized by two sperm leading to two zygotes.  With a monozygotic twin pregnancy, a DCDA pregnancy results from the separation of the zygotes at ~1-4 days post fertilization (morula) stage.      Ultrasonographic features supporting a DCDA pregnancy:    First Trimester    Presence of two gestational sacs with a thick echogenic chorion surrounding each embryo  A thick inter-twin membrane  Twin-peak sign  Two yolk sacs may be seen (this, however, does not differentiate a DCDA pregnancy from a monochorionic/diamniotic (MCDA) pregnancy)    Second Trimester    When there is no placental fusion, two separate placental sites may be seen  A finding of two different genders for each twin is a definitive feature of a dizygotic pregnancy which in turn will invariably mean a DCDA pregnancy.   If chorionicity cannot be determined, the recommendation is to manage as a monochorionic pregnancy until proven otherwise.      While the complication rate is still much higher with twins than a lai pregnancy, a DCDA  "pregnancy carries the lowest rate of complications amongst twin pregnancies.  Recognized complications:     Increased risk of intrauterine growth restriction (IUGR)  Placenta-related problems  Increased risk of velamentous cord insertion  Increased risk of marginal cord insertion  Increased incidence of placenta previa spectrum  Increased risk of gestational diabetes and preeclampsia    The current recommendation for an uncomplicated DCDA pregnancy is delivery at 38 weeks gestation.        3. Essential hypertension    4. History of gestational diabetes mellitus (GDM)  Assessment & Plan:  Reviewed with patient that has increased risk of GDM in this pregnancy given AMA and twin gestation.    - Recommend testing for GDM in first trimester and if passes then early in the usual window for testing      5. History of loop electrosurgical excision procedure (LEEP) of cervix affecting pregnancy, antepartum  Assessment & Plan:  Patient reports that was done in the time between her last pregnancy and this one.    - Will do serial cervical lengths starting at 16wks      6. History of maternal cardiomyopathy, currently pregnant  Assessment & Plan:  Patient gives a history of atypical preeclampsia with two of her pregnancies where she would get elevated BP's and profound fluid overload/pulmonary edema. She has never had protein in her urine with these episodes, however.     After her last delivery in 6/2023, she had a maternal echo that showed an EF of 56-60% but \"Left ventricular diastolic function...consistent with (grade I) impaired relaxation.\" She is followed by Cardiology and has a follow-up appointment scheduled in 4/2024.     I reviewed with the patient that twin pregnancy increases her risk of preeclampsia and of strain on her heart. I recommended she be followed closely by Cardiology, as they appear to be doing.         7. History of pre-eclampsia in prior pregnancy, currently pregnant  Assessment & Plan:  Patient with " h/o atypical preeclampsia x 2. See above for si/sx's.      - Recommend patient start a daily ASA 81mgs   - Recommend baseline preeclampsia labs and 24hr urine be collected      8. 12 weeks gestation of pregnancy         Follow Up  Return in about 4 weeks (around 2/6/2024).    I spent 45 minutes caring for the patient on the day of service. This included: obtaining or reviewing a separately obtained medical history, reviewing patient records, performing a medically appropriate exam and/or evaluation, counseling or educating the patient/family/caregiver, ordering medications, labs, and/or procedures and documenting such in the medical record. This does not include time spent on review and interpretation of other tests such as fetal ultrasound or the performance of other procedures such as amniocentesis or CVS.      Jadyn Matias MD  01/09/2024

## 2024-01-16 NOTE — ASSESSMENT & PLAN NOTE
"Patient gives a history of atypical preeclampsia with two of her pregnancies where she would get elevated BP's and profound fluid overload/pulmonary edema. She has never had protein in her urine with these episodes, however.     After her last delivery in 6/2023, she had a maternal echo that showed an EF of 56-60% but \"Left ventricular diastolic function...consistent with (grade I) impaired relaxation.\" She is followed by Cardiology and has a follow-up appointment scheduled in 4/2024.     I reviewed with the patient that twin pregnancy increases her risk of preeclampsia and of strain on her heart. I recommended she be followed closely by Cardiology, as they appear to be doing.     "

## 2024-01-16 NOTE — ASSESSMENT & PLAN NOTE
Reviewed with patient that has increased risk of GDM in this pregnancy given AMA and twin gestation.    - Recommend testing for GDM in first trimester and if passes then early in the usual window for testing

## 2024-01-16 NOTE — ASSESSMENT & PLAN NOTE
A dichorionic/diamniotic (DCDA) pregnancy is a type of twin gestation where each twin has its own chorionic and amniotic sacs.  DCDA pregnancies account for the majority (~76%) of all twin pregnancies. They account for all dizygotic pregnancies and ~20% of monozygotic pregnancies.  With a dizygotic pregnancy, two ova are independently fertilized by two sperm leading to two zygotes.  With a monozygotic twin pregnancy, a DCDA pregnancy results from the separation of the zygotes at ~1-4 days post fertilization (morula) stage.      Ultrasonographic features supporting a DCDA pregnancy:    First Trimester    Presence of two gestational sacs with a thick echogenic chorion surrounding each embryo  A thick inter-twin membrane  Twin-peak sign  Two yolk sacs may be seen (this, however, does not differentiate a DCDA pregnancy from a monochorionic/diamniotic (MCDA) pregnancy)    Second Trimester    When there is no placental fusion, two separate placental sites may be seen  A finding of two different genders for each twin is a definitive feature of a dizygotic pregnancy which in turn will invariably mean a DCDA pregnancy.   If chorionicity cannot be determined, the recommendation is to manage as a monochorionic pregnancy until proven otherwise.      While the complication rate is still much higher with twins than a lai pregnancy, a DCDA pregnancy carries the lowest rate of complications amongst twin pregnancies.  Recognized complications:     Increased risk of intrauterine growth restriction (IUGR)  Placenta-related problems  Increased risk of velamentous cord insertion  Increased risk of marginal cord insertion  Increased incidence of placenta previa spectrum  Increased risk of gestational diabetes and preeclampsia    The current recommendation for an uncomplicated DCDA pregnancy is delivery at 38 weeks gestation.

## 2024-01-23 DIAGNOSIS — Z86.32 HISTORY OF GESTATIONAL DIABETES MELLITUS (GDM): ICD-10-CM

## 2024-01-23 DIAGNOSIS — I10 ESSENTIAL HYPERTENSION: ICD-10-CM

## 2024-01-23 DIAGNOSIS — O09.899 HISTORY OF MATERNAL CARDIOMYOPATHY, CURRENTLY PREGNANT: ICD-10-CM

## 2024-01-23 DIAGNOSIS — O09.299 HISTORY OF PRE-ECLAMPSIA IN PRIOR PREGNANCY, CURRENTLY PREGNANT: ICD-10-CM

## 2024-01-23 DIAGNOSIS — O30.049 DICHORIONIC DIAMNIOTIC TWIN PREGNANCY, ANTEPARTUM: ICD-10-CM

## 2024-01-23 DIAGNOSIS — O34.40 HISTORY OF LOOP ELECTROSURGICAL EXCISION PROCEDURE (LEEP) OF CERVIX AFFECTING PREGNANCY, ANTEPARTUM: ICD-10-CM

## 2024-01-23 DIAGNOSIS — O09.529 ANTEPARTUM MULTIGRAVIDA OF ADVANCED MATERNAL AGE: Primary | ICD-10-CM

## 2024-01-23 DIAGNOSIS — Z98.890 HISTORY OF LOOP ELECTROSURGICAL EXCISION PROCEDURE (LEEP) OF CERVIX AFFECTING PREGNANCY, ANTEPARTUM: ICD-10-CM

## 2024-01-30 ENCOUNTER — APPOINTMENT (OUTPATIENT)
Dept: GENERAL RADIOLOGY | Facility: HOSPITAL | Age: 39
End: 2024-01-30
Payer: COMMERCIAL

## 2024-01-30 ENCOUNTER — HOSPITAL ENCOUNTER (EMERGENCY)
Facility: HOSPITAL | Age: 39
Discharge: HOME | End: 2024-01-30
Attending: EMERGENCY MEDICINE
Payer: COMMERCIAL

## 2024-01-30 VITALS
SYSTOLIC BLOOD PRESSURE: 127 MMHG | BODY MASS INDEX: 29.02 KG/M2 | RESPIRATION RATE: 20 BRPM | TEMPERATURE: 97.9 F | WEIGHT: 170 LBS | OXYGEN SATURATION: 97 % | HEART RATE: 82 BPM | DIASTOLIC BLOOD PRESSURE: 77 MMHG | HEIGHT: 64 IN

## 2024-01-30 DIAGNOSIS — R07.9 NONSPECIFIC CHEST PAIN: Primary | ICD-10-CM

## 2024-01-30 DIAGNOSIS — F41.9 ANXIETY: ICD-10-CM

## 2024-01-30 DIAGNOSIS — F43.9 STRESS: ICD-10-CM

## 2024-01-30 LAB
ALBUMIN SERPL-MCNC: 3.8 G/DL (ref 3.5–5.2)
ALBUMIN/GLOB SERPL: 1.2 G/DL
ALP SERPL-CCNC: 50 U/L (ref 39–117)
ALT SERPL W P-5'-P-CCNC: 34 U/L (ref 1–33)
ANION GAP SERPL CALCULATED.3IONS-SCNC: 13 MMOL/L (ref 5–15)
AST SERPL-CCNC: 23 U/L (ref 1–32)
B PARAPERT DNA SPEC QL NAA+PROBE: NOT DETECTED
B PERT DNA SPEC QL NAA+PROBE: NOT DETECTED
BASOPHILS # BLD AUTO: 0.02 10*3/MM3 (ref 0–0.2)
BASOPHILS NFR BLD AUTO: 0.3 % (ref 0–1.5)
BILIRUB SERPL-MCNC: 0.2 MG/DL (ref 0–1.2)
BILIRUB UR QL STRIP: NEGATIVE
BUN SERPL-MCNC: 9 MG/DL (ref 6–20)
BUN/CREAT SERPL: 20.9 (ref 7–25)
C PNEUM DNA NPH QL NAA+NON-PROBE: NOT DETECTED
CALCIUM SPEC-SCNC: 8.5 MG/DL (ref 8.6–10.5)
CHLORIDE SERPL-SCNC: 104 MMOL/L (ref 98–107)
CLARITY UR: CLEAR
CO2 SERPL-SCNC: 19 MMOL/L (ref 22–29)
COLOR UR: YELLOW
CREAT SERPL-MCNC: 0.43 MG/DL (ref 0.57–1)
DEPRECATED RDW RBC AUTO: 43.1 FL (ref 37–54)
EGFRCR SERPLBLD CKD-EPI 2021: 128.7 ML/MIN/1.73
EOSINOPHIL # BLD AUTO: 0.07 10*3/MM3 (ref 0–0.4)
EOSINOPHIL NFR BLD AUTO: 0.9 % (ref 0.3–6.2)
ERYTHROCYTE [DISTWIDTH] IN BLOOD BY AUTOMATED COUNT: 12.9 % (ref 12.3–15.4)
FLUAV SUBTYP SPEC NAA+PROBE: NOT DETECTED
FLUBV RNA ISLT QL NAA+PROBE: NOT DETECTED
GEN 5 2HR TROPONIN T REFLEX: <6 NG/L
GLOBULIN UR ELPH-MCNC: 3.1 GM/DL
GLUCOSE SERPL-MCNC: 102 MG/DL (ref 65–99)
GLUCOSE UR STRIP-MCNC: NEGATIVE MG/DL
HADV DNA SPEC NAA+PROBE: NOT DETECTED
HCOV 229E RNA SPEC QL NAA+PROBE: NOT DETECTED
HCOV HKU1 RNA SPEC QL NAA+PROBE: NOT DETECTED
HCOV NL63 RNA SPEC QL NAA+PROBE: NOT DETECTED
HCOV OC43 RNA SPEC QL NAA+PROBE: NOT DETECTED
HCT VFR BLD AUTO: 34.9 % (ref 34–46.6)
HGB BLD-MCNC: 12.2 G/DL (ref 12–15.9)
HGB UR QL STRIP.AUTO: NEGATIVE
HMPV RNA NPH QL NAA+NON-PROBE: NOT DETECTED
HOLD SPECIMEN: NORMAL
HPIV1 RNA ISLT QL NAA+PROBE: NOT DETECTED
HPIV2 RNA SPEC QL NAA+PROBE: NOT DETECTED
HPIV3 RNA NPH QL NAA+PROBE: NOT DETECTED
HPIV4 P GENE NPH QL NAA+PROBE: NOT DETECTED
IMM GRANULOCYTES # BLD AUTO: 0.06 10*3/MM3 (ref 0–0.05)
IMM GRANULOCYTES NFR BLD AUTO: 0.8 % (ref 0–0.5)
KETONES UR QL STRIP: ABNORMAL
LEUKOCYTE ESTERASE UR QL STRIP.AUTO: NEGATIVE
LIPASE SERPL-CCNC: 22 U/L (ref 13–60)
LYMPHOCYTES # BLD AUTO: 2.49 10*3/MM3 (ref 0.7–3.1)
LYMPHOCYTES NFR BLD AUTO: 31.8 % (ref 19.6–45.3)
M PNEUMO IGG SER IA-ACNC: NOT DETECTED
MAGNESIUM SERPL-MCNC: 1.8 MG/DL (ref 1.6–2.6)
MCH RBC QN AUTO: 32.1 PG (ref 26.6–33)
MCHC RBC AUTO-ENTMCNC: 35 G/DL (ref 31.5–35.7)
MCV RBC AUTO: 91.8 FL (ref 79–97)
MONOCYTES # BLD AUTO: 0.43 10*3/MM3 (ref 0.1–0.9)
MONOCYTES NFR BLD AUTO: 5.5 % (ref 5–12)
NEUTROPHILS NFR BLD AUTO: 4.75 10*3/MM3 (ref 1.7–7)
NEUTROPHILS NFR BLD AUTO: 60.7 % (ref 42.7–76)
NITRITE UR QL STRIP: NEGATIVE
NRBC BLD AUTO-RTO: 0 /100 WBC (ref 0–0.2)
NT-PROBNP SERPL-MCNC: <36 PG/ML (ref 0–450)
PH UR STRIP.AUTO: 5.5 [PH] (ref 5–8)
PLATELET # BLD AUTO: 223 10*3/MM3 (ref 140–450)
PMV BLD AUTO: 10.5 FL (ref 6–12)
POTASSIUM SERPL-SCNC: 3.6 MMOL/L (ref 3.5–5.2)
PROT SERPL-MCNC: 6.9 G/DL (ref 6–8.5)
PROT UR QL STRIP: NEGATIVE
QT INTERVAL: 342 MS
QTC INTERVAL: 436 MS
RBC # BLD AUTO: 3.8 10*6/MM3 (ref 3.77–5.28)
RHINOVIRUS RNA SPEC NAA+PROBE: NOT DETECTED
RSV RNA NPH QL NAA+NON-PROBE: NOT DETECTED
SARS-COV-2 RNA NPH QL NAA+NON-PROBE: NOT DETECTED
SODIUM SERPL-SCNC: 136 MMOL/L (ref 136–145)
SP GR UR STRIP: 1.02 (ref 1–1.03)
TROPONIN T DELTA: NORMAL
TROPONIN T SERPL HS-MCNC: <6 NG/L
UROBILINOGEN UR QL STRIP: ABNORMAL
WBC NRBC COR # BLD AUTO: 7.82 10*3/MM3 (ref 3.4–10.8)
WHOLE BLOOD HOLD COAG: NORMAL
WHOLE BLOOD HOLD SPECIMEN: NORMAL

## 2024-01-30 PROCEDURE — 99284 EMERGENCY DEPT VISIT MOD MDM: CPT

## 2024-01-30 PROCEDURE — 84484 ASSAY OF TROPONIN QUANT: CPT | Performed by: EMERGENCY MEDICINE

## 2024-01-30 PROCEDURE — 25810000003 SODIUM CHLORIDE 0.9 % SOLUTION: Performed by: PHYSICIAN ASSISTANT

## 2024-01-30 PROCEDURE — 83690 ASSAY OF LIPASE: CPT | Performed by: EMERGENCY MEDICINE

## 2024-01-30 PROCEDURE — 83880 ASSAY OF NATRIURETIC PEPTIDE: CPT | Performed by: EMERGENCY MEDICINE

## 2024-01-30 PROCEDURE — 71045 X-RAY EXAM CHEST 1 VIEW: CPT

## 2024-01-30 PROCEDURE — 83735 ASSAY OF MAGNESIUM: CPT | Performed by: PHYSICIAN ASSISTANT

## 2024-01-30 PROCEDURE — 85025 COMPLETE CBC W/AUTO DIFF WBC: CPT | Performed by: EMERGENCY MEDICINE

## 2024-01-30 PROCEDURE — 93005 ELECTROCARDIOGRAM TRACING: CPT | Performed by: EMERGENCY MEDICINE

## 2024-01-30 PROCEDURE — 81003 URINALYSIS AUTO W/O SCOPE: CPT | Performed by: PHYSICIAN ASSISTANT

## 2024-01-30 PROCEDURE — 0202U NFCT DS 22 TRGT SARS-COV-2: CPT | Performed by: PHYSICIAN ASSISTANT

## 2024-01-30 PROCEDURE — 80053 COMPREHEN METABOLIC PANEL: CPT | Performed by: EMERGENCY MEDICINE

## 2024-01-30 PROCEDURE — 36415 COLL VENOUS BLD VENIPUNCTURE: CPT

## 2024-01-30 RX ORDER — SODIUM CHLORIDE 0.9 % (FLUSH) 0.9 %
10 SYRINGE (ML) INJECTION AS NEEDED
Status: DISCONTINUED | OUTPATIENT
Start: 2024-01-30 | End: 2024-01-31 | Stop reason: HOSPADM

## 2024-01-30 RX ADMIN — SODIUM CHLORIDE 1000 ML: 9 INJECTION, SOLUTION INTRAVENOUS at 22:37

## 2024-01-31 NOTE — ED PROVIDER NOTES
EMERGENCY DEPARTMENT ENCOUNTER    Pt Name: Karen Garnica  MRN: [id number]  Pt :   1985  Room Number:    Date of encounter:  2024  PCP: Provider, No Known  ED Provider: DARY Nieves    Historian: Patient    HPI:  Chief Complaint: Chest pain and shortness of breath    Context: Karen Garnica is a 38 y.o. female who presents to the ED c/o chest pain and shortness of breath.  Patient reports that she started to experience shortness of breath and pain in her chest with breathing this afternoon.  Patient is concerned because she has history of cardiomyopathy.  Patient is currently 15 weeks pregnant with twins.  She denies increased life stressors and that she has been anxious and crying all day.  She denies any fever.  She reports no nausea or vomiting.  She denies any urinary complaints.  She shares that she has experienced no vaginal bleeding or discharge.  HPI     REVIEW OF SYSTEMS  A chief complaint appropriate review of systems was completed and is negative except as noted in the HPI.     PAST MEDICAL HISTORY  Past Medical History:   Diagnosis Date    Abnormal Pap smear of cervix 2023    LSIL, HPV +, followed by colposcopy    Anemia 2022    Anxiety     Chronic hypertension     Gestational diabetes     Gestational hypertension     History of pre-eclampsia 2015    History of pulmonary edema 2015    History of recurrent UTIs     Pulmonary edema        PAST SURGICAL HISTORY  Past Surgical History:   Procedure Laterality Date    LEEP      NO PAST SURGERIES         FAMILY HISTORY  Family History   Problem Relation Age of Onset    Hypertension Mother     Diabetes type II Father     Hypertension Father     Other Son         bifid uvula and polydactyly    Breast cancer Neg Hx     Colon cancer Neg Hx     Ovarian cancer Neg Hx        SOCIAL HISTORY  Social History     Socioeconomic History    Marital status:    Tobacco Use    Smoking status: Never     Passive exposure:  Never    Smokeless tobacco: Never   Vaping Use    Vaping Use: Never used   Substance and Sexual Activity    Alcohol use: Not Currently     Alcohol/week: 2.0 standard drinks of alcohol     Types: 2 Cans of beer per week     Comment: socially when not pregnant    Drug use: Never    Sexual activity: Defer     Partners: Male     Birth control/protection: None       ALLERGIES  Patient has no known allergies.    PHYSICAL EXAM  Physical Exam  GENERAL:   Appears in no acute distress.   HENT: Nares patent.  EYES: No scleral icterus.  CV: Regular rhythm, regular rate.  RESPIRATORY: Normal effort.  No audible wheezes, rales or rhonchi.  ABDOMEN: Soft, nontender  MUSCULOSKELETAL: No deformities.   NEURO: Alert, moves all extremities, follows commands.  SKIN: Warm, dry, no rash visualized.  PSYCH: Anxious  I have reviewed the triage vital signs and nursing notes.     LAB RESULTS  Results for orders placed or performed during the hospital encounter of 01/30/24   Respiratory Panel PCR w/COVID-19(SARS-CoV-2) FINN/BALJIT/KIANA/PAD/COR/CLYDE In-House, NP Swab in UTM/VTM, 2 HR TAT - Swab, Nasopharynx    Specimen: Nasopharynx; Swab   Result Value Ref Range    ADENOVIRUS, PCR Not Detected Not Detected    Coronavirus 229E Not Detected Not Detected    Coronavirus HKU1 Not Detected Not Detected    Coronavirus NL63 Not Detected Not Detected    Coronavirus OC43 Not Detected Not Detected    COVID19 Not Detected Not Detected - Ref. Range    Human Metapneumovirus Not Detected Not Detected    Human Rhinovirus/Enterovirus Not Detected Not Detected    Influenza A PCR Not Detected Not Detected    Influenza B PCR Not Detected Not Detected    Parainfluenza Virus 1 Not Detected Not Detected    Parainfluenza Virus 2 Not Detected Not Detected    Parainfluenza Virus 3 Not Detected Not Detected    Parainfluenza Virus 4 Not Detected Not Detected    RSV, PCR Not Detected Not Detected    Bordetella pertussis pcr Not Detected Not Detected    Bordetella parapertussis  PCR Not Detected Not Detected    Chlamydophila pneumoniae PCR Not Detected Not Detected    Mycoplasma pneumo by PCR Not Detected Not Detected   High Sensitivity Troponin T    Specimen: Blood   Result Value Ref Range    HS Troponin T <6 <14 ng/L   Comprehensive Metabolic Panel    Specimen: Blood   Result Value Ref Range    Glucose 102 (H) 65 - 99 mg/dL    BUN 9 6 - 20 mg/dL    Creatinine 0.43 (L) 0.57 - 1.00 mg/dL    Sodium 136 136 - 145 mmol/L    Potassium 3.6 3.5 - 5.2 mmol/L    Chloride 104 98 - 107 mmol/L    CO2 19.0 (L) 22.0 - 29.0 mmol/L    Calcium 8.5 (L) 8.6 - 10.5 mg/dL    Total Protein 6.9 6.0 - 8.5 g/dL    Albumin 3.8 3.5 - 5.2 g/dL    ALT (SGPT) 34 (H) 1 - 33 U/L    AST (SGOT) 23 1 - 32 U/L    Alkaline Phosphatase 50 39 - 117 U/L    Total Bilirubin 0.2 0.0 - 1.2 mg/dL    Globulin 3.1 gm/dL    A/G Ratio 1.2 g/dL    BUN/Creatinine Ratio 20.9 7.0 - 25.0    Anion Gap 13.0 5.0 - 15.0 mmol/L    eGFR 128.7 >60.0 mL/min/1.73   Lipase    Specimen: Blood   Result Value Ref Range    Lipase 22 13 - 60 U/L   BNP    Specimen: Blood   Result Value Ref Range    proBNP <36.0 0.0 - 450.0 pg/mL   CBC Auto Differential    Specimen: Blood   Result Value Ref Range    WBC 7.82 3.40 - 10.80 10*3/mm3    RBC 3.80 3.77 - 5.28 10*6/mm3    Hemoglobin 12.2 12.0 - 15.9 g/dL    Hematocrit 34.9 34.0 - 46.6 %    MCV 91.8 79.0 - 97.0 fL    MCH 32.1 26.6 - 33.0 pg    MCHC 35.0 31.5 - 35.7 g/dL    RDW 12.9 12.3 - 15.4 %    RDW-SD 43.1 37.0 - 54.0 fl    MPV 10.5 6.0 - 12.0 fL    Platelets 223 140 - 450 10*3/mm3    Neutrophil % 60.7 42.7 - 76.0 %    Lymphocyte % 31.8 19.6 - 45.3 %    Monocyte % 5.5 5.0 - 12.0 %    Eosinophil % 0.9 0.3 - 6.2 %    Basophil % 0.3 0.0 - 1.5 %    Immature Grans % 0.8 (H) 0.0 - 0.5 %    Neutrophils, Absolute 4.75 1.70 - 7.00 10*3/mm3    Lymphocytes, Absolute 2.49 0.70 - 3.10 10*3/mm3    Monocytes, Absolute 0.43 0.10 - 0.90 10*3/mm3    Eosinophils, Absolute 0.07 0.00 - 0.40 10*3/mm3    Basophils, Absolute 0.02 0.00 -  0.20 10*3/mm3    Immature Grans, Absolute 0.06 (H) 0.00 - 0.05 10*3/mm3    nRBC 0.0 0.0 - 0.2 /100 WBC   Magnesium    Specimen: Blood   Result Value Ref Range    Magnesium 1.8 1.6 - 2.6 mg/dL   Urinalysis With Microscopic If Indicated (No Culture) - Urine, Clean Catch    Specimen: Urine, Clean Catch   Result Value Ref Range    Color, UA Yellow Yellow, Straw    Appearance, UA Clear Clear    pH, UA 5.5 5.0 - 8.0    Specific Gravity, UA 1.019 1.001 - 1.030    Glucose, UA Negative Negative    Ketones, UA 15 mg/dL (1+) (A) Negative    Bilirubin, UA Negative Negative    Blood, UA Negative Negative    Protein, UA Negative Negative    Leuk Esterase, UA Negative Negative    Nitrite, UA Negative Negative    Urobilinogen, UA 0.2 E.U./dL 0.2 - 1.0 E.U./dL   High Sensitivity Troponin T 2Hr    Specimen: Blood   Result Value Ref Range    HS Troponin T <6 <14 ng/L    Troponin T Delta     ECG 12 Lead ED Triage Standing Order; Chest Pain   Result Value Ref Range    QT Interval 342 ms    QTC Interval 436 ms   Green Top (Gel)   Result Value Ref Range    Extra Tube Hold for add-ons.    Lavender Top   Result Value Ref Range    Extra Tube hold for add-on    Gold Top - SST   Result Value Ref Range    Extra Tube Hold for add-ons.    Gray Top   Result Value Ref Range    Extra Tube Hold for add-ons.    Light Blue Top   Result Value Ref Range    Extra Tube Hold for add-ons.        If labs were ordered, I independently reviewed the results and considered them in treating the patient.    RADIOLOGY  XR Chest 1 View   Final Result   1.No evidence for acute cardiopulmonary process.      Electronically Signed: Timoteo Coleman MD     1/30/2024 8:20 PM EST     Workstation ID: [id number]        [x] Radiologist's Report Reviewed:  I ordered and independently interpreted the above noted radiographic studies.  See radiologist's dictation for official interpretation.      PROCEDURES    Procedures    ECG 12 Lead ED Triage Standing Order; Chest Pain   Final  Result   Test Reason : ED Triage Standing Order~   Blood Pressure :   */*   mmHG   Vent. Rate :  98 BPM     Atrial Rate :  98 BPM      P-R Int : 114 ms          QRS Dur :  86 ms       QT Int : 342 ms       P-R-T Axes :  58  61  27 degrees      QTc Int : 436 ms      Normal sinus rhythm with sinus arrhythmia   Nonspecific ST abnormality   Abnormal ECG   No previous ECGs available   Confirmed by FRANCIS STERN MD ([id number]) on 1/30/2024 7:35:53 PM      Referred By: EDMD           Confirmed By: FRANCIS STERN MD          MEDICATIONS GIVEN IN ER    Medications   sodium chloride 0.9 % bolus 1,000 mL (0 mL Intravenous Stopped 1/30/24 3221)       MEDICAL DECISION MAKING, PROGRESS, and CONSULTS   Medical Decision Making  Problems Addressed:  Anxiety: complicated acute illness or injury  Nonspecific chest pain: complicated acute illness or injury  Stress: complicated acute illness or injury    Amount and/or Complexity of Data Reviewed  Labs: ordered.  Radiology: ordered.  ECG/medicine tests: ordered.    Risk  Prescription drug management.        All labs have been independently reviewed by me.  All radiology studies have been interpreted by me and the radiologist dictating the report.  All EKG's have been independently interpreted by me as well as and overseeing attending physician.    [] Discussed with radiology regarding test interpretation:    Discussion below represents my analysis of pertinent findings related to patient's condition, differential diagnosis, treatment plan and final disposition.    Differential diagnosis:  The differential diagnosis associated with the patient's presentation includes: Congestive heart failure (volume overload), acute coronary syndrome (STEMI/NSTEMI), pulmonary embolism, pneumothorax, rib contusions and fractures, intercostal muscle strains, costochondritis, pneumonia, URI, myocarditis and GERD.     Additional sources  Discussed/ obtained information from independent historians:   []  Spouse  [] Parent  [] Family member  [] Friend  [] EMS   [] Other:  External (non-ED) record review:   [] Inpatient record:   [x] Office record: Personally reviewed outpatient visit with cardiology from December 21, 2023 patient was seen and diagnosed with nonrheumatic mitral valve regurgitation   [] Outpatient record:   [] Prior Outpatient labs:   [] Prior Outpatient radiology:   [] Primary Care record:   [] Outside ED record:   [] Other:   Patient's care impacted by:   [] Diabetes  [x] Hypertension  [] Hyperlipidemia  [] Hypothyroidism   [] Coronary Artery Disease   [] COPD   [] Cancer   [] Obesity  [] GERD   [] Tobacco Abuse   [] Substance Abuse    [] Anxiety   [] Depression   [] Other:   Care significantly affected by Social Determinants of Health (housing and economic circumstances, unemployment)    [] Yes     [x] No   If yes, Patient's care significantly limited by  Social Determinants of Health including:   [] Inadequate housing   [] Low income   [] Alcoholism and drug addiction in family   [] Problems related to primary support group   [] Unemployment   [] Problems related to employment   [] Other Social Determinants of Health:     Shared decision making:  I had a discussion with the patient/family regarding diagnosis, diagnostic results, treatment plan, and medications.  The patient/family indicated understanding of these instructions.  I spent adequate time at the bedside preceding discharge necessary to personally discuss the aftercare instructions, giving patient education, providing explanations of the results of our evaluations/findings, and my decision making to assure that the patient/family understand the plan of care.  Time was allotted to answer questions at that time and throughout the ED course.  Emphasis was placed on timely follow-up after discharge.  I also discussed the potential for the development of an acute emergent condition requiring further evaluation, admission, or even surgical  intervention. I discussed that we found nothing during the visit today indicating the need for further workup, admission, or the presence of an unstable medical condition.  I encouraged the patient to return to the emergency department immediately for ANY concerns, worsening, new complaints, or if symptoms persist and unable to seek follow-up in a timely fashion.  The patient/family expressed understanding and agreement with this plan.  The patient will follow-up with OBGYN and Cardiology for reevaluation.      Orders placed during this visit:  Orders Placed This Encounter   Procedures    COVID PRE-OP / PRE-PROCEDURE SCREENING ORDER (NO ISOLATION) - Swab, Nasopharynx    Respiratory Panel PCR w/COVID-19(SARS-CoV-2) FINN/BALJIT/KIANA/PAD/COR/CLYDE In-House, NP Swab in UTM/VTM, 2 HR TAT - Swab, Nasopharynx    XR Chest 1 View    [place] Draw    High Sensitivity Troponin T    Comprehensive Metabolic Panel    Lipase    BNP    CBC Auto Differential    Magnesium    Urinalysis With Microscopic If Indicated (No Culture) - Urine, Clean Catch    High Sensitivity Troponin T 2Hr    Undress & Gown    Continuous Pulse Oximetry    CBC & Differential    Green Top (Gel)    Lavender Top    Gold Top - SST    Gray Top    Light Blue Top     ED Course:    ED Course as of 02/05/24 1243   Tue Jan 30, 2024   2333 This patient presents with chest pain, with symptoms suggestive of noncardiac chest pain. History without high risk features. Minimal CAD risk factors. Exam without evidence of volume overload, BNP normal. EKG personally interpreted by myself in addition to interpretation by attending without evidence of acute ischemic changes. Initial and two hour troponin negative. Chest imaging demonstrates no acte acute cardiopulmonary process. HEART score: 2. Based on clinical presentation and workup in ED including labs and imaging, no clear indication for treatment beyond symptomatic management. At time of discharge disposition patient resting  comfortable, no acute distress, afebrile, nontoxic in appearance, vital signs stable and able to maintain oxygen saturation of 97% on room air.  [JG]      ED Course User Index  [JG] Mickey Mckeon PA            DIAGNOSIS  Final diagnoses:   Nonspecific chest pain   Anxiety   Stress       DISPOSITION    ED Disposition       ED Disposition   Discharge    Condition   Stable    Comment   --               Please note that portions of this document were completed with voice recognition software.        Mickey Mckeon PA  02/05/24 2068

## 2024-01-31 NOTE — DISCHARGE INSTRUCTIONS
Symptomatic care is recommended. Take all medications as prescribed and instructed. Follow up with OB/GYN and cardiology as directed or return to Emergency Department with worsening of symptoms.

## 2024-02-13 ENCOUNTER — TRANSCRIBE ORDERS (OUTPATIENT)
Dept: LAB | Facility: HOSPITAL | Age: 39
End: 2024-02-13
Payer: COMMERCIAL

## 2024-02-13 ENCOUNTER — HOSPITAL ENCOUNTER (OUTPATIENT)
Dept: WOMENS IMAGING | Facility: HOSPITAL | Age: 39
Discharge: HOME | End: 2024-02-13
Payer: COMMERCIAL

## 2024-02-13 ENCOUNTER — LAB (OUTPATIENT)
Dept: LAB | Facility: HOSPITAL | Age: 39
End: 2024-02-13
Payer: COMMERCIAL

## 2024-02-13 ENCOUNTER — OFFICE VISIT (OUTPATIENT)
Dept: OBSTETRICS AND GYNECOLOGY | Facility: HOSPITAL | Age: 39
End: 2024-02-13
Payer: COMMERCIAL

## 2024-02-13 VITALS — BODY MASS INDEX: 29.18 KG/M2 | WEIGHT: 170 LBS | DIASTOLIC BLOOD PRESSURE: 75 MMHG | SYSTOLIC BLOOD PRESSURE: 121 MMHG

## 2024-02-13 DIAGNOSIS — O09.899 HISTORY OF MATERNAL CARDIOMYOPATHY, CURRENTLY PREGNANT: ICD-10-CM

## 2024-02-13 DIAGNOSIS — Z86.32 HISTORY OF GESTATIONAL DIABETES MELLITUS (GDM): ICD-10-CM

## 2024-02-13 DIAGNOSIS — Z98.890 HISTORY OF LOOP ELECTROSURGICAL EXCISION PROCEDURE (LEEP) OF CERVIX AFFECTING PREGNANCY, ANTEPARTUM: ICD-10-CM

## 2024-02-13 DIAGNOSIS — O34.40 HISTORY OF LOOP ELECTROSURGICAL EXCISION PROCEDURE (LEEP) OF CERVIX AFFECTING PREGNANCY, ANTEPARTUM: ICD-10-CM

## 2024-02-13 DIAGNOSIS — I10 ESSENTIAL HYPERTENSION: ICD-10-CM

## 2024-02-13 DIAGNOSIS — O30.049 DICHORIONIC DIAMNIOTIC TWIN PREGNANCY, ANTEPARTUM: ICD-10-CM

## 2024-02-13 DIAGNOSIS — O09.299 HISTORY OF PRE-ECLAMPSIA IN PRIOR PREGNANCY, CURRENTLY PREGNANT: ICD-10-CM

## 2024-02-13 DIAGNOSIS — Z34.90 PREGNANCY, UNSPECIFIED GESTATIONAL AGE: ICD-10-CM

## 2024-02-13 DIAGNOSIS — Z3A.17 17 WEEKS GESTATION OF PREGNANCY: Primary | ICD-10-CM

## 2024-02-13 DIAGNOSIS — Z3A.17 17 WEEKS GESTATION OF PREGNANCY: ICD-10-CM

## 2024-02-13 DIAGNOSIS — O09.529 ANTEPARTUM MULTIGRAVIDA OF ADVANCED MATERNAL AGE: Primary | ICD-10-CM

## 2024-02-13 DIAGNOSIS — O09.529 ANTEPARTUM MULTIGRAVIDA OF ADVANCED MATERNAL AGE: ICD-10-CM

## 2024-02-13 PROCEDURE — 87086 URINE CULTURE/COLONY COUNT: CPT

## 2024-02-13 PROCEDURE — 76815 OB US LIMITED FETUS(S): CPT

## 2024-02-14 LAB — BACTERIA SPEC AEROBE CULT: NO GROWTH

## 2024-02-28 ENCOUNTER — HOSPITAL ENCOUNTER (OUTPATIENT)
Dept: WOMENS IMAGING | Facility: HOSPITAL | Age: 39
Discharge: HOME | End: 2024-02-28
Admitting: OBSTETRICS & GYNECOLOGY
Payer: COMMERCIAL

## 2024-02-28 ENCOUNTER — OFFICE VISIT (OUTPATIENT)
Dept: OBSTETRICS AND GYNECOLOGY | Facility: HOSPITAL | Age: 39
End: 2024-02-28
Payer: COMMERCIAL

## 2024-02-28 VITALS — SYSTOLIC BLOOD PRESSURE: 114 MMHG | BODY MASS INDEX: 29.7 KG/M2 | DIASTOLIC BLOOD PRESSURE: 65 MMHG | WEIGHT: 173 LBS

## 2024-02-28 DIAGNOSIS — O34.40 HISTORY OF LOOP ELECTROSURGICAL EXCISION PROCEDURE (LEEP) OF CERVIX AFFECTING PREGNANCY, ANTEPARTUM: ICD-10-CM

## 2024-02-28 DIAGNOSIS — O30.049 DICHORIONIC DIAMNIOTIC TWIN PREGNANCY, ANTEPARTUM: ICD-10-CM

## 2024-02-28 DIAGNOSIS — O09.529 ANTEPARTUM MULTIGRAVIDA OF ADVANCED MATERNAL AGE: ICD-10-CM

## 2024-02-28 DIAGNOSIS — O09.299 HISTORY OF PRE-ECLAMPSIA IN PRIOR PREGNANCY, CURRENTLY PREGNANT: ICD-10-CM

## 2024-02-28 DIAGNOSIS — O09.899 HISTORY OF MATERNAL CARDIOMYOPATHY, CURRENTLY PREGNANT: Primary | ICD-10-CM

## 2024-02-28 DIAGNOSIS — Z86.32 HISTORY OF GESTATIONAL DIABETES MELLITUS (GDM): ICD-10-CM

## 2024-02-28 DIAGNOSIS — O09.899 HISTORY OF MATERNAL CARDIOMYOPATHY, CURRENTLY PREGNANT: ICD-10-CM

## 2024-02-28 DIAGNOSIS — Z98.890 HISTORY OF LOOP ELECTROSURGICAL EXCISION PROCEDURE (LEEP) OF CERVIX AFFECTING PREGNANCY, ANTEPARTUM: ICD-10-CM

## 2024-02-28 DIAGNOSIS — Z34.90 PREGNANCY, UNSPECIFIED GESTATIONAL AGE: ICD-10-CM

## 2024-02-28 PROBLEM — I10 ESSENTIAL HYPERTENSION: Status: RESOLVED | Noted: 2023-09-08 | Resolved: 2024-02-28

## 2024-02-28 PROCEDURE — 76812 OB US DETAILED ADDL FETUS: CPT

## 2024-02-28 PROCEDURE — 76811 OB US DETAILED SNGL FETUS: CPT

## 2024-02-28 NOTE — ASSESSMENT & PLAN NOTE
Currently stable   Has follow up with Cardiology 4/2024   Plan continued expectant management and monitoring for signs/sx of preeclampsia or fluid overload

## 2024-02-28 NOTE — LETTER
"2024       No Recipients    Patient: Karen Garnica   YOB: 1985   Date of Visit: 2024       Dear Karen Osorio MD,    Thank you for referring Karen Garnica to me for evaluation. Below is a copy of my consult note.    If you have questions, please do not hesitate to call me. I look forward to following Karen along with you.         Sincerely,        Manisha Addison MD        CC:   No Recipients        Maternal/Fetal Medicine Follow Up Note     Name: Karen Garnica    : 1985     MRN: [id number]     Referring Provider: Karen Osorio MD    Chief Complaint  Advanced Maternal Age    Subjective     History of Present Illness:  Karen Garnica is a 38 y.o.  19w1d who presents today for follow up in the setting of AMA, prior preeclampsia with fluid overload/pulmonary edema/mitral valve regurg and some possibility of diastolic failure (see prior notes), Di/Di twins   Overall well with no interval issues   No SOA, edema   NIPS low risk   Has follow up with Cardiology in 2024       SUMMER: Estimated Date of Delivery: 24     ROS:   As noted in HPI.     Objective     Vital Signs  /65   Wt 78.5 kg (173 lb)   LMP 10/17/2023   Estimated body mass index is 29.7 kg/m² as calculated from the following:    Height as of 24: 162.6 cm (64\").    Weight as of this encounter: 78.5 kg (173 lb).    Ultrasound Impression:   See viewpoint      Assessment and Plan     Karen Garnica is a 38 y.o.  19w1d     Diagnoses and all orders for this visit:    1. History of maternal cardiomyopathy, currently pregnant (Primary)  Assessment & Plan:  Currently stable   Has follow up with Cardiology 2024   Plan continued expectant management and monitoring for signs/sx of preeclampsia or fluid overload       2. History of pre-eclampsia in prior pregnancy, currently pregnant  Assessment & Plan:  Recommend low dose ASA if not currently taking   BP is normal here today "       3. Antepartum multigravida of advanced maternal age    4. Dichorionic diamniotic twin pregnancy, antepartum  Assessment & Plan:  Appropriate and concordant interval growth   Continue q 4 week growth scans with MFM            Follow Up  4 weeks     I spent 20 minutes caring for the patient on the day of service. This included: obtaining or reviewing a separately obtained medical history, reviewing patient records, performing a medically appropriate exam and/or evaluation, counseling or educating the patient/family/caregiver, ordering medications, labs, and/or procedures and documenting such in the medical record. This does not include time spent on review and interpretation of other tests such as fetal ultrasound or the performance of other procedures such as amniocentesis or CVS.    Manisha Addison MD FACOG  Maternal Fetal Medicine, [facility]     2024

## 2024-02-28 NOTE — PROGRESS NOTES
"    Maternal/Fetal Medicine Follow Up Note     Name: Karen Garnica    : 1985     MRN: [id number]     Referring Provider: Karen Osorio MD    Chief Complaint  Advanced Maternal Age    Subjective     History of Present Illness:  Karen Garnica is a 38 y.o.  19w1d who presents today for follow up in the setting of AMA, prior preeclampsia with fluid overload/pulmonary edema/mitral valve regurg and some possibility of diastolic failure (see prior notes), Di/Di twins   Overall well with no interval issues   No SOA, edema   NIPS low risk   Has follow up with Cardiology in 2024       SUMMER: Estimated Date of Delivery: 24     ROS:   As noted in HPI.     Objective     Vital Signs  /65   Wt 78.5 kg (173 lb)   LMP 10/17/2023   Estimated body mass index is 29.7 kg/m² as calculated from the following:    Height as of 24: 162.6 cm (64\").    Weight as of this encounter: 78.5 kg (173 lb).    Ultrasound Impression:   See viewpoint      Assessment and Plan     Karen Garnica is a 38 y.o.  19w1d     Diagnoses and all orders for this visit:    1. History of maternal cardiomyopathy, currently pregnant (Primary)  Assessment & Plan:  Currently stable   Has follow up with Cardiology 2024   Plan continued expectant management and monitoring for signs/sx of preeclampsia or fluid overload       2. History of pre-eclampsia in prior pregnancy, currently pregnant  Assessment & Plan:  Recommend low dose ASA if not currently taking   BP is normal here today       3. Antepartum multigravida of advanced maternal age    4. Dichorionic diamniotic twin pregnancy, antepartum  Assessment & Plan:  Appropriate and concordant interval growth   Continue q 4 week growth scans with MFM            Follow Up  4 weeks     I spent 20 minutes caring for the patient on the day of service. This included: obtaining or reviewing a separately obtained medical history, reviewing patient records, performing a " medically appropriate exam and/or evaluation, counseling or educating the patient/family/caregiver, ordering medications, labs, and/or procedures and documenting such in the medical record. This does not include time spent on review and interpretation of other tests such as fetal ultrasound or the performance of other procedures such as amniocentesis or CVS.    Manisha Addison MD FACOG  Maternal Fetal Medicine, [facility]     2024

## 2024-03-28 ENCOUNTER — OFFICE VISIT (OUTPATIENT)
Dept: OBSTETRICS AND GYNECOLOGY | Facility: HOSPITAL | Age: 39
End: 2024-03-28
Payer: COMMERCIAL

## 2024-03-28 ENCOUNTER — HOSPITAL ENCOUNTER (OUTPATIENT)
Dept: WOMENS IMAGING | Facility: HOSPITAL | Age: 39
Discharge: HOME | End: 2024-03-28
Admitting: OBSTETRICS & GYNECOLOGY
Payer: COMMERCIAL

## 2024-03-28 VITALS — WEIGHT: 182 LBS | SYSTOLIC BLOOD PRESSURE: 139 MMHG | BODY MASS INDEX: 31.24 KG/M2 | DIASTOLIC BLOOD PRESSURE: 72 MMHG

## 2024-03-28 DIAGNOSIS — O09.899 HISTORY OF MATERNAL CARDIOMYOPATHY, CURRENTLY PREGNANT: ICD-10-CM

## 2024-03-28 DIAGNOSIS — O09.899 HISTORY OF MATERNAL CARDIOMYOPATHY, CURRENTLY PREGNANT: Primary | ICD-10-CM

## 2024-03-28 DIAGNOSIS — O30.049 DICHORIONIC DIAMNIOTIC TWIN PREGNANCY, ANTEPARTUM: ICD-10-CM

## 2024-03-28 DIAGNOSIS — O09.299 HISTORY OF PRE-ECLAMPSIA IN PRIOR PREGNANCY, CURRENTLY PREGNANT: ICD-10-CM

## 2024-03-28 DIAGNOSIS — O09.529 ANTEPARTUM MULTIGRAVIDA OF ADVANCED MATERNAL AGE: ICD-10-CM

## 2024-03-28 PROCEDURE — 76816 OB US FOLLOW-UP PER FETUS: CPT

## 2024-03-28 NOTE — ASSESSMENT & PLAN NOTE
Currently on aspirin prophylaxis.  Blood pressure today 139/72.  No reason to start blood pressure medication at this time.

## 2024-03-28 NOTE — ASSESSMENT & PLAN NOTE
Potentially secondary to volume overload in the setting of preeclampsia.  Patient with cardiology appointment in May.  Currently asymptomatic

## 2024-03-28 NOTE — LETTER
"2024       No Recipients    Patient: Karen Garnica   YOB: 1985   Date of Visit: 3/28/2024       Dear Karen Osorio MD,    Thank you for referring Karen Garnica to me for evaluation. Below is a copy of my consult note.    If you have questions, please do not hesitate to call me. I look forward to following Karen along with you.         Sincerely,        Wicho Sarabia MD        CC:   No Recipients    Pt states she has not really felt FM yet this morning but did feel them last night.        Maternal/Fetal Medicine Consult Note   Date: 2024  Name: Karen Garnica    : 1985     MRN: [id number]     Referring Provider: Karen Osorio MD    Chief Complaint  di-di twins    Subjective     History of Present Illness:  Karen Garnica is a 38 y.o.  23w2d who presents today for di/di twins    Patient reports slight decrease in fetal movement this morning but did feel last night.     SUMMER: Estimated Date of Delivery: 24     ROS:   Otherwise Noted in HPI      Current Outpatient Medications:   •  ondansetron ODT (ZOFRAN-ODT) 4 MG disintegrating tablet, Take 1 tablet by mouth As Needed., Disp: , Rfl:   •  Prenatal Vit-Fe Fumarate-FA (Prenatal Vitamin) 27-0.8 MG tablet, Take 1 tablet by mouth Daily., Disp: 30 tablet, Rfl: 11    Objective     Vital Signs  /72   Wt 82.6 kg (182 lb)   LMP 10/17/2023   Estimated body mass index is 31.24 kg/m² as calculated from the following:    Height as of 24: 162.6 cm (64\").    Weight as of this encounter: 82.6 kg (182 lb).    Ultrasound Impression:   See Viewpoint     Assessment and Plan     Karen Garnica is a 38 y.o.  23w2d who presents today for di/di twins    Diagnoses and all orders for this visit:    1. History of maternal cardiomyopathy, currently pregnant (Primary)  Assessment & Plan:  Potentially secondary to volume overload in the setting of preeclampsia.  Patient with cardiology appointment in May.  " Currently asymptomatic      2. Dichorionic diamniotic twin pregnancy, antepartum  Assessment & Plan:  Fetal growth is normal x 2.  Plan for follow-up in 4 weeks      3. History of pre-eclampsia in prior pregnancy, currently pregnant  Assessment & Plan:  Currently on aspirin prophylaxis.  Blood pressure today 139/72.  No reason to start blood pressure medication at this time.           Follow Up  Follow-up in 4 weeks    I spent 10 minutes caring for the patient on the day of service. This included: obtaining or reviewing a separately obtained medical history, reviewing patient records, performing a medically appropriate exam and/or evaluation, counseling or educating the patient/family/caregiver, ordering medications, labs, and/or procedures and documenting such in the medical record. This does not include time spent on review and interpretation of other tests such as fetal ultrasound or the performance of other procedures such as amniocentesis or CVS.      Wicho Sarabia MD, FACOG  Maternal Fetal Medicine, [facility] [place]

## 2024-03-28 NOTE — PROGRESS NOTES
"    Maternal/Fetal Medicine Consult Note   Date: 2024  Name: Karen Garnica    : 1985     MRN: [id number]     Referring Provider: Karen Osorio MD    Chief Complaint  di-di twins    Subjective     History of Present Illness:  Karen Garnica is a 38 y.o.  23w2d who presents today for di/di twins    Patient reports slight decrease in fetal movement this morning but did feel last night.     SUMMER: Estimated Date of Delivery: 24     ROS:   Otherwise Noted in HPI      Current Outpatient Medications:     ondansetron ODT (ZOFRAN-ODT) 4 MG disintegrating tablet, Take 1 tablet by mouth As Needed., Disp: , Rfl:     Prenatal Vit-Fe Fumarate-FA (Prenatal Vitamin) 27-0.8 MG tablet, Take 1 tablet by mouth Daily., Disp: 30 tablet, Rfl: 11    Objective     Vital Signs  /72   Wt 82.6 kg (182 lb)   LMP 10/17/2023   Estimated body mass index is 31.24 kg/m² as calculated from the following:    Height as of 24: 162.6 cm (64\").    Weight as of this encounter: 82.6 kg (182 lb).    Ultrasound Impression:   See Viewpoint     Assessment and Plan     Karen Garnica is a 38 y.o.  23w2d who presents today for di/di twins    Diagnoses and all orders for this visit:    1. History of maternal cardiomyopathy, currently pregnant (Primary)  Assessment & Plan:  Potentially secondary to volume overload in the setting of preeclampsia.  Patient with cardiology appointment in May.  Currently asymptomatic      2. Dichorionic diamniotic twin pregnancy, antepartum  Assessment & Plan:  Fetal growth is normal x 2.  Plan for follow-up in 4 weeks      3. History of pre-eclampsia in prior pregnancy, currently pregnant  Assessment & Plan:  Currently on aspirin prophylaxis.  Blood pressure today 139/72.  No reason to start blood pressure medication at this time.           Follow Up  Follow-up in 4 weeks    I spent 10 minutes caring for the patient on the day of service. This included: obtaining or reviewing a " separately obtained medical history, reviewing patient records, performing a medically appropriate exam and/or evaluation, counseling or educating the patient/family/caregiver, ordering medications, labs, and/or procedures and documenting such in the medical record. This does not include time spent on review and interpretation of other tests such as fetal ultrasound or the performance of other procedures such as amniocentesis or CVS.      Wicho Sarabia MD, FACOG  Maternal Fetal Medicine, [facility] [place]

## 2024-04-16 ENCOUNTER — TELEPHONE (OUTPATIENT)
Dept: CARDIOLOGY | Facility: CLINIC | Age: 39
End: 2024-04-16

## 2024-04-16 NOTE — TELEPHONE ENCOUNTER
LVM to [facility] for appt. on 5/16/2024 with [address] in [place], call if any questions [phone]

## 2024-04-25 ENCOUNTER — HOSPITAL ENCOUNTER (OUTPATIENT)
Dept: WOMENS IMAGING | Facility: HOSPITAL | Age: 39
Discharge: HOME | End: 2024-04-25
Admitting: OBSTETRICS & GYNECOLOGY
Payer: COMMERCIAL

## 2024-04-25 ENCOUNTER — OFFICE VISIT (OUTPATIENT)
Dept: OBSTETRICS AND GYNECOLOGY | Facility: HOSPITAL | Age: 39
End: 2024-04-25
Payer: COMMERCIAL

## 2024-04-25 VITALS — WEIGHT: 187 LBS | DIASTOLIC BLOOD PRESSURE: 66 MMHG | SYSTOLIC BLOOD PRESSURE: 139 MMHG | BODY MASS INDEX: 32.1 KG/M2

## 2024-04-25 DIAGNOSIS — O30.049 DICHORIONIC DIAMNIOTIC TWIN PREGNANCY, ANTEPARTUM: Primary | ICD-10-CM

## 2024-04-25 PROCEDURE — 76816 OB US FOLLOW-UP PER FETUS: CPT

## 2024-04-25 NOTE — PROGRESS NOTES
Patient denies bleeding, leaking fluid and some rebeca ruiz  NIPT low risk  Patients next follow up with Dr. Osorio is 5/9/24

## 2024-05-09 ENCOUNTER — HOSPITAL ENCOUNTER (OUTPATIENT)
Dept: WOMENS IMAGING | Facility: HOSPITAL | Age: 39
Discharge: HOME | End: 2024-05-09
Admitting: OBSTETRICS & GYNECOLOGY
Payer: COMMERCIAL

## 2024-05-09 ENCOUNTER — OFFICE VISIT (OUTPATIENT)
Dept: OBSTETRICS AND GYNECOLOGY | Facility: HOSPITAL | Age: 39
End: 2024-05-09
Payer: COMMERCIAL

## 2024-05-09 VITALS — SYSTOLIC BLOOD PRESSURE: 127 MMHG | WEIGHT: 192.6 LBS | DIASTOLIC BLOOD PRESSURE: 65 MMHG | BODY MASS INDEX: 33.06 KG/M2

## 2024-05-09 DIAGNOSIS — O09.899 HISTORY OF MATERNAL CARDIOMYOPATHY, CURRENTLY PREGNANT: ICD-10-CM

## 2024-05-09 DIAGNOSIS — O30.049 DICHORIONIC DIAMNIOTIC TWIN PREGNANCY, ANTEPARTUM: ICD-10-CM

## 2024-05-09 DIAGNOSIS — O09.299 HISTORY OF PRE-ECLAMPSIA IN PRIOR PREGNANCY, CURRENTLY PREGNANT: ICD-10-CM

## 2024-05-09 DIAGNOSIS — O09.899 HISTORY OF MATERNAL CARDIOMYOPATHY, CURRENTLY PREGNANT: Primary | ICD-10-CM

## 2024-05-09 PROCEDURE — 99213 OFFICE O/P EST LOW 20 MIN: CPT | Performed by: OBSTETRICS & GYNECOLOGY

## 2024-05-09 PROCEDURE — 76816 OB US FOLLOW-UP PER FETUS: CPT

## 2024-05-13 NOTE — ASSESSMENT & PLAN NOTE
Patient appears clinically stable currently   Twins with appropriate and concordant growth   She has follow up scheduled with Cardiology this month. Anticipate that they will do an echocardiogram. Would like to have one more prior to delivery   Of note, majority of patient complications (postpartum pulmonary edema, diastolic heart failure, hypertension) occur postpartum (x 2)   Recommend admission for delivery at 37 weeks GA for delivery with aggressive management of fluid balance postpartum (consider scheduled Lasix) and plan for close postpartum follow up (24 to 48 hours after discharge)   Will see Ms Garnica back in 4 weeks   Recommend  testing starting at 32 weeks GA

## 2024-05-13 NOTE — PROGRESS NOTES
"    Maternal/Fetal Medicine Follow Up Note     Name: Karen Garnica    : 1985     MRN: [id number]     Referring Provider: Karen Osorio MD    Chief Complaint  Di/ Di Twins, AMA, Hx of cardiomyopathy and Preeclampsia     Subjective     History of Present Illness:  Karen Garnica is a 38 y.o.  29w6d who presents today for follow up in the setting of known Di/Di twin gestation and history of postpartum preeclampsia x 2.   Preeclampsia was complicated by hypertension, pulmonary edema and transient diastolic heart failure. She was managed with PO lasix after her last delivery though her admission though still presented 1 week postpartum with pulmonary edema   Follows with Cardiology   Has follow up this month       SUMMER: Estimated Date of Delivery: 24     ROS:   As noted in HPI.     Objective     Vital Signs  /65   Wt 87.4 kg (192 lb 9.6 oz)   LMP 10/17/2023   Estimated body mass index is 33.06 kg/m² as calculated from the following:    Height as of 24: 162.6 cm (64\").    Weight as of this encounter: 87.4 kg (192 lb 9.6 oz).    Ultrasound Impression:   See viewpoint      Assessment and Plan     Karen Garnica is a 38 y.o.  29w6d     Diagnoses and all orders for this visit:    1. History of maternal cardiomyopathy, currently pregnant (Primary)  Assessment & Plan:  Patient appears clinically stable currently   Twins with appropriate and concordant growth   She has follow up scheduled with Cardiology this month. Anticipate that they will do an echocardiogram. Would like to have one more prior to delivery   Of note, majority of patient complications (postpartum pulmonary edema, diastolic heart failure, hypertension) occur postpartum (x 2)   Recommend admission for delivery at 37 weeks GA for delivery with aggressive management of fluid balance postpartum (consider scheduled Lasix) and plan for close postpartum follow up (24 to 48 hours after discharge)   Will see Ms Garnica " back in 4 weeks   Recommend  testing starting at 32 weeks GA       2. History of pre-eclampsia in prior pregnancy, currently pregnant    3. Dichorionic diamniotic twin pregnancy, antepartum         Follow Up  4 weeks     I spent 10 minutes caring for the patient on the day of service. This included: obtaining or reviewing a separately obtained medical history, reviewing patient records, performing a medically appropriate exam and/or evaluation, counseling or educating the patient/family/caregiver, ordering medications, labs, and/or procedures and documenting such in the medical record. This does not include time spent on review and interpretation of other tests such as fetal ultrasound or the performance of other procedures such as amniocentesis or CVS.    Manisha Addison MD FACOG  Maternal Fetal Medicine, [facility]     2024

## 2024-05-17 ENCOUNTER — HOSPITAL ENCOUNTER (OUTPATIENT)
Facility: HOSPITAL | Age: 39
Discharge: HOME | End: 2024-05-17
Attending: OBSTETRICS & GYNECOLOGY | Admitting: OBSTETRICS & GYNECOLOGY
Payer: COMMERCIAL

## 2024-05-17 VITALS
BODY MASS INDEX: 34.02 KG/M2 | SYSTOLIC BLOOD PRESSURE: 137 MMHG | RESPIRATION RATE: 18 BRPM | TEMPERATURE: 98.7 F | WEIGHT: 192 LBS | OXYGEN SATURATION: 98 % | DIASTOLIC BLOOD PRESSURE: 88 MMHG | HEART RATE: 103 BPM | HEIGHT: 63 IN

## 2024-05-17 PROBLEM — O30.003 TWIN GESTATION IN THIRD TRIMESTER: Status: ACTIVE | Noted: 2024-05-17

## 2024-05-17 LAB
EXPIRATION DATE: NORMAL
Lab: NORMAL
PROT UR STRIP-MCNC: NEGATIVE MG/DL

## 2024-05-17 PROCEDURE — 81002 URINALYSIS NONAUTO W/O SCOPE: CPT | Performed by: OBSTETRICS & GYNECOLOGY

## 2024-05-17 PROCEDURE — G0463 HOSPITAL OUTPT CLINIC VISIT: HCPCS

## 2024-05-17 PROCEDURE — 59025 FETAL NON-STRESS TEST: CPT

## 2024-05-17 PROCEDURE — G0378 HOSPITAL OBSERVATION PER HR: HCPCS

## 2024-05-17 PROCEDURE — 59025 FETAL NON-STRESS TEST: CPT | Performed by: OBSTETRICS & GYNECOLOGY

## 2024-05-17 PROCEDURE — 99213 OFFICE O/P EST LOW 20 MIN: CPT | Performed by: OBSTETRICS & GYNECOLOGY

## 2024-05-17 RX ORDER — UREA 10 %
500 LOTION (ML) TOPICAL 2 TIMES DAILY
COMMUNITY

## 2024-05-17 NOTE — H&P
"[facility]  Obstetric History and Physical    Referring Provider: No Known Provider      Chief Complaint   Patient presents with    Abdominal Pain       Subjective     Patient is a 38 y.o. female  currently at 30w3d, who presents with complaint of abdominal pain.  Patient reports sharp intermittent left upper quadrant pain increases with movement.  Patient denies nausea, vomiting, recent trauma, leaking fluid, vaginal bleeding, reports normal fetal activity.   course complicated by AMA, di/di twin gestation, history of preeclampsia with previous pregnancy, history of postpartum cardiomyopathy, and obesity BMI 34.01.        The following portions of the patients history were reviewed and updated as appropriate: current medications, allergies, past medical history, past surgical history, past family history, past social history, and problem list .       Prenatal Information:   Maternal Prenatal Labs  Blood Type No results found for: \"ABO\"   Rh Status No results found for: \"RH\"   Antibody Screen No results found for: \"ABSCRN\"   Gonnorhea No results found for: \"GCCX\"   Chlamydia No results found for: \"CLAMYDCU\"   RPR No results found for: \"RPR\"   Syphilis Antibody No results found for: \"SYPHILIS\"   Rubella No results found for: \"RUBELLAIGGIN\"   Hepatitis B Surface Antigen No results found for: \"HEPBSAG\"   HIV-1 Antibody No results found for: \"LABHIV1\"   Hepatitis C Antibody No results found for: \"HEPCAB\"   Rapid Urin Drug Screen No results found for: \"AMPMETHU\", \"BARBITSCNUR\", \"LABBENZSCN\", \"LABMETHSCN\", \"LABOPIASCN\", \"THCURSCR\", \"COCAINEUR\", \"AMPHETSCREEN\", \"PROPOXSCN\", \"BUPRENORSCNU\", \"METAMPSCNUR\", \"OXYCODONESCN\", \"TRICYCLICSCN\"   Group B Strep Culture No results found for: \"GBSANTIGEN\"           External Prenatal Results       Pregnancy Outside Results - Transcribed From Office Records - See Scanned Records For Details       Test Value Date Time    ABO  O  23 1739    Rh  Positive  23 " 1739    Antibody Screen  Negative  23 1739    Varicella IgG       Rubella  5.82 index 23 1739    Hgb  12.2 g/dL 24 1930       12.3 g/dL 23 1739    Hct  34.9 % 24 1930       35.5 % 23 1739    Glucose Fasting GTT       Glucose Tolerance Test 1 hour       Glucose Tolerance Test 3 hour       Gonorrhea (discrete)  Negative  23 1739    Chlamydia (discrete)  Negative  23 1739    RPR       VDRL       Syphilis Antibody       HBsAg  Non-Reactive  23 1739    Herpes Simplex Virus PCR       Herpes Simplex VIrus Culture       HIV  Non-Reactive  23 1739    Hep C RNA Quant PCR       Hep C Antibody  Non-Reactive  23 1739    AFP       Group B Strep       GBS Susceptibility to Clindamycin       GBS Susceptibility to Erythromycin       Fetal Fibronectin       Genetic Testing, Maternal Blood                 Drug Screening       Test Value Date Time    NIPT        Urine Drug Screen       Amphetamine Screen  Negative  23 1739    Barbiturate Screen  Negative  23 1739    Benzodiazepine Screen  Negative  23 1739    Methadone Screen  Negative  23 1739    Phencyclidine Screen  Negative  23 1739    Opiates Screen  Negative  23 1739    THC Screen  Negative  23 1739    Cocaine Screen       Propoxyphene Screen       Buprenorphine Screen  Negative  23 1739    Methamphetamine Screen       Oxycodone Screen  Negative  23 1739              Legend    ^: Historical                              Past OB History:       OB History    Para Term  AB Living   7 4 4 0 2 4   SAB IAB Ectopic Molar Multiple Live Births   2 0 0 0 0 4      # Outcome Date GA Lbr Noah/2nd Weight Sex Type Anes PTL Lv   7 Current            6 Term 23 38w4d 15:53 / 00:17 3790 g (8 lb 5.7 oz) M Vag-Spont EPI N JONN      Name: MAAME LUNA      Apgar1: 8  Apgar5: 9   5 Term 16 37w4d  3260 g (7 lb 3 oz) M Vag-Spont EPI  JONN      Complications:  Pregnancy-induced hypertension   4 Term 04/04/15 38w2d  3459 g (7 lb 10 oz) F Vag-Spont EPI  JONN      Complications: Pregnancy-induced hypertension   3 SAB 2014 6w0d          2 SAB 2013           1 Term 07/08/10 39w0d  2977 g (6 lb 9 oz) M Vag-Spont EPI  JONN      Complications: Pregnancy-induced hypertension       Past Medical History: Past Medical History:   Diagnosis Date    Abnormal Pap smear of cervix 07/12/2023    LSIL, HPV +, followed by colposcopy    Anemia 09/01/2022    Anxiety     History of gestational diabetes     History of pre-eclampsia 02/01/2015    History of pulmonary edema 02/2015    History of recurrent UTIs     Hx of cardiomyopathy     post delivery    Hx of Gestational hypertension     Pulmonary edema       Past Surgical History Past Surgical History:   Procedure Laterality Date    LEEP      NO PAST SURGERIES        Family History: Family History   Problem Relation Age of Onset    Hypertension Mother     Diabetes type II Father     Hypertension Father     Other Son         bifid uvula and polydactyly    Breast cancer Neg Hx     Colon cancer Neg Hx     Ovarian cancer Neg Hx       Social History:  reports that she has never smoked. She has never been exposed to tobacco smoke. She has never used smokeless tobacco.   reports that she does not currently use alcohol after a past usage of about 2.0 standard drinks of alcohol per week.   reports no history of drug use.                   General ROS Negative Findings:Headaches, Visual Changes, Epigastric pain, Anorexia, Nausia/Vomiting, ROM, and Vaginal Bleeding    ROS     All other systems have been reviewed and are neg  Objective       Vital Signs Range for the last 24 hours  Temperature: Temp:  [98.7 °F (37.1 °C)] 98.7 °F (37.1 °C)   Temp Source: Temp src: Oral   BP:     Pulse:     Respirations: Resp:  [18] 18   SPO2:     O2 Amount (l/min):     O2 Devices     Weight: Weight:  [87.1 kg (192 lb)] 87.1 kg (192 lb)     Physical Examination:   General:    alert, appears stated age, and cooperative   Skin:   normal   HEENT:     Lungs:   clear to auscultation bilaterally   Heart:   regular rate and rhythm, S1, S2 normal, no murmur, click, rub or gallop   Gastrointestinal: Abdomen soft, gravid uterus, no guarding, rebound, benign exam negative CVA tenderness.  Small amount of tenderness noted to deep palpation left upper quadrant.  No hernias, no palpable masses.   Lower Extremities: No edema, no calf tenderness   : External genitalia: normal general appearance  Uterus: enlarged   Musculoskeletal:     Neuro: No focal deficits noted.         Presentation: Vertex vertex   Cervix: Exam by: Method: sterile exam per physician   Dilation:  Closed   Effacement:  40% thick   Station:  Not engaged  No blood noted on glove.       Fetal Heart Rate Assessment   Method:     Beats/min:     Baseline:     Varibility:     Accels:     Decels:     Tracing Category:     NST-indications di/di twins abdominal pain, interpretation reactive x 2, moderate variability type II, accelerations present 10 x 10, no significant fetal deceleration noted, onset 1832, end time 1910 no regular contractions noted.  Uterine Assessment   Method:     Frequency (min):     Ctx Count in 10 min:     Duration:     Intensity:     Intensity by IUPC:     Resting Tone:     Resting Tone by IUPC:     [place] Units:       Laboratory Results:   Lab Results (last 24 hours)       Procedure Component Value Units Date/Time    POC Protein, Urine, Qualitative, Dipstick [[id number]] Collected: 05/17/24 1851    Specimen: Urine Updated: 05/17/24 1851     Protein, POC Negative mg/dL      Lot Number [id number]     Expiration Date 08-          Radiology Review:   Imaging Results (Last 24 Hours)       ** No results found for the last 24 hours. **          Other Studies:    Assessment & Plan       Twin gestation in third trimester    Antepartum multigravida of advanced maternal age    Dichorionic diamniotic twin pregnancy,  antepartum    History of maternal cardiomyopathy, currently pregnant    History of pre-eclampsia in prior pregnancy, currently pregnant        Assessment:  1.  Intrauterine pregnancy at 30w3d weeks gestation with reactive fetal status.    2.  Discomforts of pregnancy  3.  Di/di twin gestation  4.  False labor  5.      Plan:  1.  Discharge,  labor and preeclampsia instructions, recommend pregnancy binder, discussed with patient proper nutrition, hydration, and activity.  Follow-up OB provider routine or.  2. Plan of care has been reviewed with patient.  3.  Risks, benefits of treatment plan have been discussed.  4.  All questions have been answered.  5      Fady Vela DO  2024  18:53 EDT

## 2024-05-18 ENCOUNTER — HOSPITAL ENCOUNTER (OUTPATIENT)
Facility: HOSPITAL | Age: 39
Discharge: HOME | End: 2024-05-19
Attending: OBSTETRICS & GYNECOLOGY | Admitting: OBSTETRICS & GYNECOLOGY
Payer: COMMERCIAL

## 2024-05-18 PROBLEM — O34.40 HISTORY OF LOOP ELECTROSURGICAL EXCISION PROCEDURE (LEEP) OF CERVIX AFFECTING PREGNANCY, ANTEPARTUM: Status: RESOLVED | Noted: 2024-01-16 | Resolved: 2024-05-18

## 2024-05-18 PROBLEM — N71.9 ENDOMETRITIS: Status: RESOLVED | Noted: 2023-06-06 | Resolved: 2024-05-18

## 2024-05-18 PROBLEM — Z87.59 HISTORY OF GESTATIONAL HYPERTENSION: Status: RESOLVED | Noted: 2022-12-06 | Resolved: 2024-05-18

## 2024-05-18 PROBLEM — Z98.890 HISTORY OF LOOP ELECTROSURGICAL EXCISION PROCEDURE (LEEP) OF CERVIX AFFECTING PREGNANCY, ANTEPARTUM: Status: RESOLVED | Noted: 2024-01-16 | Resolved: 2024-05-18

## 2024-05-18 PROBLEM — O30.003 TWIN GESTATION IN THIRD TRIMESTER: Status: RESOLVED | Noted: 2024-05-17 | Resolved: 2024-05-18

## 2024-05-18 PROBLEM — R10.12 LUQ PAIN: Status: ACTIVE | Noted: 2024-05-18

## 2024-05-18 LAB
ALBUMIN SERPL-MCNC: 3.3 G/DL (ref 3.5–5.2)
ALBUMIN/GLOB SERPL: 1 G/DL
ALP SERPL-CCNC: 105 U/L (ref 39–117)
ALT SERPL W P-5'-P-CCNC: 14 U/L (ref 1–33)
AMYLASE SERPL-CCNC: 26 U/L (ref 28–100)
ANION GAP SERPL CALCULATED.3IONS-SCNC: 14 MMOL/L (ref 5–15)
AST SERPL-CCNC: 16 U/L (ref 1–32)
BILIRUB SERPL-MCNC: 0.7 MG/DL (ref 0–1.2)
BUN SERPL-MCNC: 9 MG/DL (ref 6–20)
BUN/CREAT SERPL: 12.9 (ref 7–25)
CALCIUM SPEC-SCNC: 8.4 MG/DL (ref 8.6–10.5)
CHLORIDE SERPL-SCNC: 101 MMOL/L (ref 98–107)
CO2 SERPL-SCNC: 18 MMOL/L (ref 22–29)
CREAT SERPL-MCNC: 0.7 MG/DL (ref 0.57–1)
DEPRECATED RDW RBC AUTO: 43.2 FL (ref 37–54)
EGFRCR SERPLBLD CKD-EPI 2021: 114.4 ML/MIN/1.73
ERYTHROCYTE [DISTWIDTH] IN BLOOD BY AUTOMATED COUNT: 12.6 % (ref 12.3–15.4)
GLOBULIN UR ELPH-MCNC: 3.4 GM/DL
GLUCOSE SERPL-MCNC: 119 MG/DL (ref 65–99)
HCT VFR BLD AUTO: 32.7 % (ref 34–46.6)
HGB BLD-MCNC: 11.5 G/DL (ref 12–15.9)
LIPASE SERPL-CCNC: 15 U/L (ref 13–60)
MCH RBC QN AUTO: 32.7 PG (ref 26.6–33)
MCHC RBC AUTO-ENTMCNC: 35.2 G/DL (ref 31.5–35.7)
MCV RBC AUTO: 92.9 FL (ref 79–97)
NT-PROBNP SERPL-MCNC: <36 PG/ML (ref 0–450)
PLATELET # BLD AUTO: 182 10*3/MM3 (ref 140–450)
PMV BLD AUTO: 10.5 FL (ref 6–12)
POTASSIUM SERPL-SCNC: 3.8 MMOL/L (ref 3.5–5.2)
PROT SERPL-MCNC: 6.7 G/DL (ref 6–8.5)
RBC # BLD AUTO: 3.52 10*6/MM3 (ref 3.77–5.28)
SODIUM SERPL-SCNC: 133 MMOL/L (ref 136–145)
TROPONIN T SERPL HS-MCNC: 6 NG/L
WBC NRBC COR # BLD AUTO: 14.4 10*3/MM3 (ref 3.4–10.8)

## 2024-05-18 PROCEDURE — 96361 HYDRATE IV INFUSION ADD-ON: CPT

## 2024-05-18 PROCEDURE — 83690 ASSAY OF LIPASE: CPT | Performed by: OBSTETRICS & GYNECOLOGY

## 2024-05-18 PROCEDURE — 93005 ELECTROCARDIOGRAM TRACING: CPT | Performed by: OBSTETRICS & GYNECOLOGY

## 2024-05-18 PROCEDURE — 83880 ASSAY OF NATRIURETIC PEPTIDE: CPT | Performed by: OBSTETRICS & GYNECOLOGY

## 2024-05-18 PROCEDURE — 84484 ASSAY OF TROPONIN QUANT: CPT | Performed by: OBSTETRICS & GYNECOLOGY

## 2024-05-18 PROCEDURE — 36415 COLL VENOUS BLD VENIPUNCTURE: CPT | Performed by: OBSTETRICS & GYNECOLOGY

## 2024-05-18 PROCEDURE — 82150 ASSAY OF AMYLASE: CPT | Performed by: OBSTETRICS & GYNECOLOGY

## 2024-05-18 PROCEDURE — 25010000002 FENTANYL CITRATE (PF) 50 MCG/ML SOLUTION: Performed by: OBSTETRICS & GYNECOLOGY

## 2024-05-18 PROCEDURE — 25810000003 LACTATED RINGERS PER 1000 ML: Performed by: OBSTETRICS & GYNECOLOGY

## 2024-05-18 PROCEDURE — 96374 THER/PROPH/DIAG INJ IV PUSH: CPT

## 2024-05-18 PROCEDURE — 80053 COMPREHEN METABOLIC PANEL: CPT | Performed by: OBSTETRICS & GYNECOLOGY

## 2024-05-18 PROCEDURE — G0463 HOSPITAL OUTPT CLINIC VISIT: HCPCS

## 2024-05-18 PROCEDURE — 85027 COMPLETE CBC AUTOMATED: CPT | Performed by: OBSTETRICS & GYNECOLOGY

## 2024-05-18 PROCEDURE — 59025 FETAL NON-STRESS TEST: CPT

## 2024-05-18 RX ORDER — ONDANSETRON 2 MG/ML
4 INJECTION INTRAMUSCULAR; INTRAVENOUS EVERY 8 HOURS PRN
Status: DISCONTINUED | OUTPATIENT
Start: 2024-05-18 | End: 2024-05-19 | Stop reason: HOSPADM

## 2024-05-18 RX ORDER — DOCUSATE SODIUM 100 MG/1
100 CAPSULE, LIQUID FILLED ORAL 2 TIMES DAILY PRN
Status: DISCONTINUED | OUTPATIENT
Start: 2024-05-18 | End: 2024-05-19 | Stop reason: HOSPADM

## 2024-05-18 RX ORDER — SODIUM CHLORIDE 0.9 % (FLUSH) 0.9 %
10 SYRINGE (ML) INJECTION EVERY 12 HOURS SCHEDULED
Status: DISCONTINUED | OUTPATIENT
Start: 2024-05-18 | End: 2024-05-19 | Stop reason: HOSPADM

## 2024-05-18 RX ORDER — SODIUM CHLORIDE 0.9 % (FLUSH) 0.9 %
10 SYRINGE (ML) INJECTION AS NEEDED
Status: DISCONTINUED | OUTPATIENT
Start: 2024-05-18 | End: 2024-05-19 | Stop reason: HOSPADM

## 2024-05-18 RX ORDER — ONDANSETRON 4 MG/1
8 TABLET, ORALLY DISINTEGRATING ORAL EVERY 8 HOURS PRN
Status: DISCONTINUED | OUTPATIENT
Start: 2024-05-18 | End: 2024-05-19 | Stop reason: HOSPADM

## 2024-05-18 RX ORDER — LIDOCAINE HYDROCHLORIDE 10 MG/ML
0.5 INJECTION, SOLUTION EPIDURAL; INFILTRATION; INTRACAUDAL; PERINEURAL ONCE AS NEEDED
Status: DISCONTINUED | OUTPATIENT
Start: 2024-05-18 | End: 2024-05-19 | Stop reason: HOSPADM

## 2024-05-18 RX ORDER — SODIUM CHLORIDE 9 MG/ML
40 INJECTION, SOLUTION INTRAVENOUS AS NEEDED
Status: DISCONTINUED | OUTPATIENT
Start: 2024-05-18 | End: 2024-05-19 | Stop reason: HOSPADM

## 2024-05-18 RX ORDER — SODIUM CHLORIDE, SODIUM LACTATE, POTASSIUM CHLORIDE, CALCIUM CHLORIDE 600; 310; 30; 20 MG/100ML; MG/100ML; MG/100ML; MG/100ML
125 INJECTION, SOLUTION INTRAVENOUS CONTINUOUS
Status: DISCONTINUED | OUTPATIENT
Start: 2024-05-18 | End: 2024-05-19 | Stop reason: HOSPADM

## 2024-05-18 RX ORDER — CITRIC ACID/SODIUM CITRATE 334-500MG
30 SOLUTION, ORAL ORAL ONCE
Status: COMPLETED | OUTPATIENT
Start: 2024-05-18 | End: 2024-05-18

## 2024-05-18 RX ORDER — BISACODYL 10 MG
10 SUPPOSITORY, RECTAL RECTAL DAILY PRN
Status: DISCONTINUED | OUTPATIENT
Start: 2024-05-18 | End: 2024-05-19 | Stop reason: HOSPADM

## 2024-05-18 RX ORDER — FENTANYL CITRATE 50 UG/ML
100 INJECTION, SOLUTION INTRAMUSCULAR; INTRAVENOUS
Status: DISCONTINUED | OUTPATIENT
Start: 2024-05-18 | End: 2024-05-19 | Stop reason: HOSPADM

## 2024-05-18 RX ADMIN — SODIUM CHLORIDE, POTASSIUM CHLORIDE, SODIUM LACTATE AND CALCIUM CHLORIDE 125 ML/HR: 600; 310; 30; 20 INJECTION, SOLUTION INTRAVENOUS at 20:50

## 2024-05-18 RX ADMIN — SODIUM CHLORIDE, POTASSIUM CHLORIDE, SODIUM LACTATE AND CALCIUM CHLORIDE 125 ML/HR: 600; 310; 30; 20 INJECTION, SOLUTION INTRAVENOUS at 22:47

## 2024-05-18 RX ADMIN — FENTANYL CITRATE 100 MCG: 50 INJECTION, SOLUTION INTRAMUSCULAR; INTRAVENOUS at 23:51

## 2024-05-18 RX ADMIN — SODIUM CITRATE AND CITRIC ACID MONOHYDRATE 30 ML: 334; 500 SOLUTION ORAL at 19:32

## 2024-05-18 NOTE — H&P
[facility]  Karen Garnica  : 1985  MRN: [id number]  [facility]: [id number]    History and Physical    Subjective   Chief Complaint   Patient presents with    left upper quadrant pain     Karen Garnica is a 38 y.o. year old  with an Estimated Date of Delivery: 24 currently at 30w4d presenting with left upper quadrant pain that radiates to the midline at times.  She has a prior cardiac history and has worried something could be going on with her heart.  The symptoms began yesterday morning.  They have been present for about 36 hours now.  The pain is consistent and does not get worse.  There has been some associated shortness of breath but it is hard to tell if this is because of the abdominal distention related to twins.  Denies headache or vision change.  Denies diaphoresis.    She has a history of cardiomyopathy following her most recent pregnancy.  She tells me she sees a cardiologist and baseline echo has returned to normal.  Plans are to continue to follow-up intervally with the cardiologist throughout this pregnancy    Prenatal care has been with Dr. Karen Osorio in consultation with perinatology.  It has been remarkable for a known dichorionic/diamniotic twin gestation with appropriate fetal growth.  She does have a history as stated of prior postpartum cardiomyopathy along with preeclampsia in a previous pregnancy.  She does have advanced maternal age and cell free DNA has reportedly been normal for both twins.      OB History    Para Term  AB Living   7 4 4 0 2 4   SAB IAB Ectopic Molar Multiple Live Births   2 0 0 0 0 4      # Outcome Date GA Lbr Noah/2nd Weight Sex Type Anes PTL Lv   7 Current            6 Term 23 38w4d 15:53 / 00:17 3790 g (8 lb 5.7 oz) M Vag-Spont EPI N JONN      Name: Chapito      Apgar1: 8  Apgar5: 9   5 Term 16 37w4d  3260 g (7 lb 3 oz) M Vag-Spont EPI  JONN      Complications: Pregnancy-induced hypertension      Name: Chente   4 Term  04/04/15 38w2d  3459 g (7 lb 10 oz) F Vag-Spont EPI  JONN      Complications: Pregnancy-induced hypertension, Preeclampsia in postpartum period, Pulmonary edema      Name: Carrington   3 SAB 2014 6w0d          2 SAB 2013           1 Term 07/08/10 39w0d  2977 g (6 lb 9 oz) M Vag-Spont EPI  JONN      Name: Mayelin      Obstetric Comments   2010 - Mayelin (FOB #1)   2015 - Carrington (FOB #2)   2016 - Chente (FOB #3)   2023 - Chapito (FOB #3)   Current  (FOB #3)       Past Medical History:   Diagnosis Date    Preeclampsia in postpartum period 02/2015    Along with pulmonary edema    Postpartum anxiety 2023    History of gestational diabetes - with 4th pregnancy 2023    Hx of postpatum cardiomyopathy 2023    LGSIL on Pap smear of cervix 07/12/2023    Tx wtih LEEP       Past Surgical History:   Procedure Laterality Date    LEEP  09/2023    Path = FELICIA 2 with clear margins       Medications Prior to Admission   Medication Sig Dispense Refill Last Dose    magnesium, as, gluconate (MAGONATE) 500 (27 Mg) MG tablet Take 1 tablet by mouth 2 (Two) Times a Day.   5/18/2024    ondansetron ODT (ZOFRAN-ODT) 4 MG disintegrating tablet Take 1 tablet by mouth As Needed.   5/18/2024    Prenatal Vit-Fe Fumarate-FA (Prenatal Vitamin) 27-0.8 MG tablet Take 1 tablet by mouth Daily. 30 tablet 11 5/18/2024       No Known Allergies  Social History    Tobacco Use      Smoking status: Never        Passive exposure: Never      Smokeless tobacco: Never    Review of Systems   Constitutional:  Negative for unexpected weight change.   Respiratory:  Negative for cough and shortness of breath.    Cardiovascular:  Positive for chest pain.   Gastrointestinal:  Positive for abdominal pain. Negative for abdominal distention, constipation and diarrhea.        No persistent bloating   Genitourinary:  Negative for difficulty urinating, dysuria, hematuria and urgency.        No significant incontinence   Skin:  Negative for rash.   Neurological:  Negative for headaches.       "   Objective   Patient Vitals for the past 24 hrs:   BP Pulse SpO2 Height Weight   24 -- (!) 138 95 % -- --   24 -- (!) 143 95 % -- --   24 -- (!) 137 95 % -- --   24 -- (!) 141 96 % -- --   24 -- (!) 140 97 % -- --   24 -- (!) 132 96 % -- --   24 -- (!) 123 96 % -- --   24 -- (!) 137 95 % -- --   24 137/81 -- -- -- --   24 -- -- -- 162.6 cm (64\") 89.4 kg (197 lb)     General: well developed; well nourished  no acute distress   Abdomen: no umbilical or inguinal hernias are present  no hepato-splenomegaly  There is some tenderness to palpation in the left upper quadrant   FHT's: reassuring and category 1 for both twins      Cervix: was not checked.   Contractions: none     Prenatal Labs  Lab Results   Component Value Date    HGB 12.2 2024    RUBELLAABIGG 5.82 2023    HEPBSAG Non-Reactive 2023    ABSCRN Negative 2023    TCK8BOC0 Non-Reactive 2023    HEPCVIRUSABY Non-Reactive 2023    STREPGPB No Group B Streptococcus isolated 2023    URINECX No growth 2024    CHLAMNAA Negative 2023    NGONORRHON Negative 2023          Assessment   Dichorionic/diamniotic twin gestation at 30w4d  Left upper quadrant pain of unclear etiology - additional workup is needed.  Low index of suspicion for cardiac etiology but cannot exclude  History of postpartum cardiomyopathy which has resolved per patient report.  Advanced maternal age with normal cell free DNA  Maternal tachycardia     Plan   Check EKG  Check labs including amylase, lipase, troponin and BNP  Bicitra empirically  Continue to monitor    Papito Callahan MD  2024  19:36 EDT          Non Stress Test     Livan Jones Jane Danika  : 1985  MRN: [id number]  CSN: [id number]  Date: 2024    Estimated Date of Delivery: 24  Gestational Age: 30w4d    Indication for NST Evaluation for " left upper quadrant pain       Total Time on NST > 20 minutes       Interpretation    TWIN A    Baseline  beats per minute   Category 1   Decelerations Absent       TWIN B    Baseline  beats per minute   Category 1   Decelerations   Absent   Additional Comments none       This note has been electronically signed.    Papito Callahan MD  5/18/2024  19:41 EDT

## 2024-05-19 ENCOUNTER — APPOINTMENT (OUTPATIENT)
Dept: CARDIOLOGY | Facility: HOSPITAL | Age: 39
End: 2024-05-19
Payer: COMMERCIAL

## 2024-05-19 VITALS
RESPIRATION RATE: 16 BRPM | BODY MASS INDEX: 32.46 KG/M2 | DIASTOLIC BLOOD PRESSURE: 66 MMHG | HEIGHT: 64 IN | TEMPERATURE: 97.9 F | OXYGEN SATURATION: 98 % | SYSTOLIC BLOOD PRESSURE: 124 MMHG | WEIGHT: 190.12 LBS | HEART RATE: 118 BPM

## 2024-05-19 PROBLEM — R10.12 LUQ PAIN: Status: RESOLVED | Noted: 2024-05-18 | Resolved: 2024-05-19

## 2024-05-19 LAB
BH CV ECHO MEAS - AO MAX PG: 17.4 MMHG
BH CV ECHO MEAS - AO MEAN PG: 9.5 MMHG
BH CV ECHO MEAS - AO ROOT DIAM: 2.6 CM
BH CV ECHO MEAS - AO V2 MAX: 208.5 CM/SEC
BH CV ECHO MEAS - AO V2 VTI: 34.8 CM
BH CV ECHO MEAS - AVA(I,D): 1.87 CM2
BH CV ECHO MEAS - EDV(CUBED): 110.6 ML
BH CV ECHO MEAS - EDV(MOD-SP2): 108 ML
BH CV ECHO MEAS - EDV(MOD-SP4): 133 ML
BH CV ECHO MEAS - EF(MOD-BP): 55.5 %
BH CV ECHO MEAS - EF(MOD-SP2): 59.3 %
BH CV ECHO MEAS - EF(MOD-SP4): 55.6 %
BH CV ECHO MEAS - ESV(CUBED): 34.4 ML
BH CV ECHO MEAS - ESV(MOD-SP2): 44 ML
BH CV ECHO MEAS - ESV(MOD-SP4): 59 ML
BH CV ECHO MEAS - FS: 32.2 %
BH CV ECHO MEAS - IVS/LVPW: 1 CM
BH CV ECHO MEAS - IVSD: 1.1 CM
BH CV ECHO MEAS - LA DIMENSION: 4.3 CM
BH CV ECHO MEAS - LAT PEAK E' VEL: 10.9 CM/SEC
BH CV ECHO MEAS - LV MASS(C)D: 194 GRAMS
BH CV ECHO MEAS - LV MAX PG: 5.1 MMHG
BH CV ECHO MEAS - LV MEAN PG: 2.5 MMHG
BH CV ECHO MEAS - LV V1 MAX: 112.5 CM/SEC
BH CV ECHO MEAS - LV V1 VTI: 20.4 CM
BH CV ECHO MEAS - LVIDD: 4.8 CM
BH CV ECHO MEAS - LVIDS: 3.3 CM
BH CV ECHO MEAS - LVOT AREA: 3.2 CM2
BH CV ECHO MEAS - LVOT DIAM: 2.02 CM
BH CV ECHO MEAS - LVPWD: 1.1 CM
BH CV ECHO MEAS - MED PEAK E' VEL: 9.2 CM/SEC
BH CV ECHO MEAS - MV A MAX VEL: 122.5 CM/SEC
BH CV ECHO MEAS - MV DEC SLOPE: 677.8 CM/SEC2
BH CV ECHO MEAS - MV DEC TIME: 0.17 SEC
BH CV ECHO MEAS - MV E MAX VEL: 125 CM/SEC
BH CV ECHO MEAS - MV E/A: 1.02
BH CV ECHO MEAS - MV MAX PG: 7.1 MMHG
BH CV ECHO MEAS - MV MEAN PG: 3.1 MMHG
BH CV ECHO MEAS - MV P1/2T: 46.3 MSEC
BH CV ECHO MEAS - MV V2 VTI: 22.2 CM
BH CV ECHO MEAS - MVA(P1/2T): 4.8 CM2
BH CV ECHO MEAS - MVA(VTI): 2.9 CM2
BH CV ECHO MEAS - PA ACC TIME: 0.09 SEC
BH CV ECHO MEAS - PA V2 MAX: 143.9 CM/SEC
BH CV ECHO MEAS - RAP SYSTOLE: 3 MMHG
BH CV ECHO MEAS - RVSP: 18 MMHG
BH CV ECHO MEAS - SV(LVOT): 64.9 ML
BH CV ECHO MEAS - SV(MOD-SP2): 64 ML
BH CV ECHO MEAS - SV(MOD-SP4): 74 ML
BH CV ECHO MEAS - TAPSE (>1.6): 2.26 CM
BH CV ECHO MEAS - TR MAX PG: 14.9 MMHG
BH CV ECHO MEAS - TR MAX VEL: 192.7 CM/SEC
BH CV ECHO MEASUREMENTS AVERAGE E/E' RATIO: 12.44
BH CV XLRA - TDI S': 15.3 CM/SEC
GEN 5 2HR TROPONIN T REFLEX: 7 NG/L
LEFT ATRIUM VOLUME INDEX: 46.3 ML/M2
QT INTERVAL: 318 MS
QTC INTERVAL: 449 MS
TROPONIN T DELTA: 1 NG/L

## 2024-05-19 PROCEDURE — 25010000002 FENTANYL CITRATE (PF) 50 MCG/ML SOLUTION: Performed by: OBSTETRICS & GYNECOLOGY

## 2024-05-19 PROCEDURE — 93306 TTE W/DOPPLER COMPLETE: CPT | Performed by: INTERNAL MEDICINE

## 2024-05-19 PROCEDURE — 96376 TX/PRO/DX INJ SAME DRUG ADON: CPT

## 2024-05-19 PROCEDURE — 59025 FETAL NON-STRESS TEST: CPT

## 2024-05-19 PROCEDURE — 93306 TTE W/DOPPLER COMPLETE: CPT

## 2024-05-19 PROCEDURE — 25810000003 LACTATED RINGERS PER 1000 ML: Performed by: OBSTETRICS & GYNECOLOGY

## 2024-05-19 PROCEDURE — 96361 HYDRATE IV INFUSION ADD-ON: CPT

## 2024-05-19 PROCEDURE — 84484 ASSAY OF TROPONIN QUANT: CPT | Performed by: OBSTETRICS & GYNECOLOGY

## 2024-05-19 RX ADMIN — SODIUM CHLORIDE, POTASSIUM CHLORIDE, SODIUM LACTATE AND CALCIUM CHLORIDE 125 ML/HR: 600; 310; 30; 20 INJECTION, SOLUTION INTRAVENOUS at 13:11

## 2024-05-19 RX ADMIN — SODIUM CHLORIDE, POTASSIUM CHLORIDE, SODIUM LACTATE AND CALCIUM CHLORIDE 125 ML/HR: 600; 310; 30; 20 INJECTION, SOLUTION INTRAVENOUS at 05:23

## 2024-05-19 RX ADMIN — FENTANYL CITRATE 100 MCG: 50 INJECTION, SOLUTION INTRAMUSCULAR; INTRAVENOUS at 03:15

## 2024-05-19 NOTE — PROGRESS NOTES
Patient name: Karen Garnica  YOB: 1985   MRN: [id number]  Admission Date: 2024  Date of Service: 2024    Karen Garnica is a 38 y.o.    at 30w5d  admitted on 2024 for LUQ pain    Hospital day 2    Diagnoses:   Patient Active Problem List    Diagnosis     *LUQ pain [R10.12]     AMA - normal cfDNA [O09.529]     Dichorionic diamniotic twin pregnancy, antepartum [O30.049]     History of maternal cardiomyopathy, currently pregnant [O09.899]     History of gestational diabetes mellitus (GDM) [Z86.32]     History of pre-eclampsia in prior pregnancy [O09.299]     HSIL (high grade squamous intraepithelial lesion) on Pap smear of cervix [R87.613]        Subjective:      Karen has no complaints today. Her LUQ pain has resolved. She does not c/o chest pain or SOA.     REVIEW OF SYSTEMS:  Reports fetal movement is normal x 2  Amniotic Fluid: Denies leakage of amniotic fluid.  Presence of Vaginal Bleeding Assessed: Denies vaginal bleeding  Patient Reports: No contractions  All other systems reviewed and are negative    Objective:     Vital signs:  Temp:  [97.7 °F (36.5 °C)-98.7 °F (37.1 °C)] 97.9 °F (36.6 °C)  Heart Rate:  [] 112  Resp:  [16] 16  BP: (109-144)/(57-93) 124/66      PHYSICAL EXAM:  General appearance - alert and in no distress  Mental status - alert, oriented to person, place, and time, normal mood, behavior, speech, dress, motor activity, affect appropriate   Chest - normal respiratory effort, no respiratory distress  Heart- mild tachycardia  Abdomen - gravid  Pelvic- exam deferred  Neurological-no focal deficits  Extremities-no pedal edema noted  Skin-normal coloration and turgor, no rashes, no suspicious skin lesions noted      FHT's: Category 1 x 2  TOCO: none      Medications:  sodium chloride, 10 mL, Intravenous, Q12H  sodium chloride, 10 mL, Intravenous, Q12H         bisacodyl    docusate sodium    fentaNYL citrate (PF)    lidocaine PF 1%    ondansetron ODT  **OR** ondansetron    sodium chloride    sodium chloride    sodium chloride    Labs:    Lab Results   Component Value Date    WBC 14.40 (H) 2024    HGB 11.5 (L) 2024    HCT 32.7 (L) 2024    MCV 92.9 2024     2024    POCGLU 112 2023    CREATININE 0.70 2024    URICACID 4.2 2023    AST 16 2024    ALT 14 2024     2023     Complete Transthoracic Echocardiogram Interpretation Summary         Left ventricular systolic function is normal. Calculated left ventricular EF = 55.5% Left ventricular ejection fraction appears to be 56 - 60%.    The left atrial cavity is dilated.    Left atrial volume is moderately increased.    Aortic valve area is 1.9 cm2.    Peak velocity of the flow distal to the aortic valve is 208.5 cm/s. Aortic valve mean pressure gradient is 10 mmHg.    Estimated right ventricular systolic pressure from tricuspid regurgitation is normal (<35 mmHg).    There is an eccentric jet of mitral regurgitation which is mild to moderate but may be worse. The left atrium is dilated suggesting it may be worse. Suggest clinical correlation with symptoms        Assessment/Plan:      Karen is a 38 y.o.    at 30w5d.  1. Dichorionic diamniotic twin pregnancy  2. LUQ pain - resolved  3. Maternal tachycardia  4. History of postpartum cardiomyopathy in     Plan:   1. Maternal fetal status reassuring, FHR category I x 2, appropriate for gestational age  2. Tachycardia improved to 100s-110s, asymptomatic  3. Echo with EF 55%; mitral regurgitation noted   4. LUQ pain resolved  5. Discharge home, has f/u at [facility] on ; works from home so plans light activity; would recommend outpatient f/u with cardiology     All questions were answered to the best of my ability.    Landy Hagan MD  2024  13:58 EDT

## 2024-05-19 NOTE — DISCHARGE SUMMARY
Date of Discharge:  2024    Discharge Diagnosis: di/di TIUP at 30w5d, LUQ pain (resolved), history of postpartum cardiomyopathy    Presenting Problem/History of Present Illness  LUQ pain [R10.12]       Hospital Course  Patient is a 38 y.o. female  presented with di/di TIUP at 30w4d with c/o LUQ pain. She had been seen and discharged from LD triage the day prior. Due to persistence of her symptoms and her history of postpartum cardiomyopathy, she was admitted overnight for observation and echocardiogram. Echo performed on HD#2 showed EF 55%. Her LUQ pain resolved and she denied complaints of CP or SOA. She was deemed stable for discharge home with outpatient cardiology f/u. She has an OB appt sched at [facility] on 24.      Consults:   Consults       No orders found for last 30 day(s).            Pertinent Test Results:     Complete Transthoracic Echocardiogram Interpretation Summary         Left ventricular systolic function is normal. Calculated left ventricular EF = 55.5% Left ventricular ejection fraction appears to be 56 - 60%.    The left atrial cavity is dilated.    Left atrial volume is moderately increased.    Aortic valve area is 1.9 cm2.    Peak velocity of the flow distal to the aortic valve is 208.5 cm/s. Aortic valve mean pressure gradient is 10 mmHg.    Estimated right ventricular systolic pressure from tricuspid regurgitation is normal (<35 mmHg).    There is an eccentric jet of mitral regurgitation which is mild to moderate but may be worse. The left atrium is dilated suggesting it may be worse. Suggest clinical correlation with symptoms    Labs:  Lab Results   Component Value Date    WBC 14.40 (H) 2024    HGB 11.5 (L) 2024    HCT 32.7 (L) 2024    MCV 92.9 2024     2024    POCGLU 112 2023    CREATININE 0.70 2024    URICACID 4.2 2023    AST 16 2024    ALT 14 2024     2023           Discharge Disposition:  To  Home    Condition on Discharge:  Stable    Vital Signs  Temp:  [97.7 °F (36.5 °C)-98.7 °F (37.1 °C)] 97.9 °F (36.6 °C)  Heart Rate:  [] 112  Resp:  [16] 16  BP: (109-144)/(57-93) 124/66      Discharge Disposition  Home or Self Care    Discharge Medications     Discharge Medications        Continue These Medications        Instructions Start Date   magnesium (as) gluconate 500 (27 Mg) MG tablet  Commonly known as: MAGONATE   500 mg, Oral, 2 Times Daily      ondansetron ODT 4 MG disintegrating tablet  Commonly known as: ZOFRAN-ODT   4 mg, Oral, As Needed      Prenatal Vitamin 27-0.8 MG tablet   1 tablet, Oral, Daily               Discharge Diet: Regular    Activity at Discharge: No restrictions    Follow-up Appointments  Future Appointments   Date Time Provider Department Center   6/7/2024  9:45 AM [facility] US 1  [facility]  [facility]   6/7/2024  9:45 AM  [facility] FOLLOW UP [facility] None   6/7/2024 11:00 AM Panfilo Mishra MD [facility]   7/16/2024  4:30 PM Karlos Dias MD [facility]  [facility]         Test Results Pending at Discharge       Landy Hagan MD  05/19/24  14:06 EDT    Time: 20 minutes

## 2024-05-19 NOTE — PLAN OF CARE
Problem: Adult Inpatient Plan of Care  Goal: Optimal Comfort and Wellbeing  Intervention: Provide Person-Centered Care  Recent Flowsheet Documentation  Taken 5/19/2024 0810 by Penelope Green RN  Trust Relationship/Rapport:   care explained   choices provided   emotional support provided   empathic listening provided   questions answered   questions encouraged   reassurance provided   thoughts/feelings acknowledged   Goal Outcome Evaluation:    Progressed to discharge

## 2024-05-19 NOTE — DISCHARGE INSTRUCTIONS
Keep your next office appointments as instructed by Dr. Hagan.  The cardiomyopathy instruction is attached in accordance with your request.  If you have any questions, concerns or problems at all, call your physician immediately.

## 2024-05-19 NOTE — PROGRESS NOTES
Labourist:      S:   Karen continues to complain of LUQ pain with no improvements.    She denies chest pain and SOA.       O:            P: 105-120      BP:  135/81    SaO2: 97%            Labs:    Lab Results   Component Value Date     2024    HGB 11.5 (L) 2024    HCT 32.7 (L) 2024    WBC 14.40 (H) 2024      Lab Results   Component Value Date     (L) 2024    K 3.8 2024     2024    CO2 18.0 (L) 2024    BUN 9 2024    CREATININE 0.70 2024    GLUCOSE 119 (H) 2024    ALBUMIN 3.3 (L) 2024    CALCIUM 8.4 (L) 2024    AST 16 2024    ALT 14 2024    BILITOT 0.7 2024      Lab Results   Component Value Date    AMYLASE 26 (L) 2024    LIPASE 15 2024     Troponin 6    ProBNP  <36    A/P  39 yo   at 30 4/7 with upper abdominal pain     Admit to APU  IV access -   Labs, EKG  ECHO tomorrow  Possible cardiology

## 2024-05-29 ENCOUNTER — HOSPITAL ENCOUNTER (INPATIENT)
Facility: HOSPITAL | Age: 39
LOS: 1 days | Discharge: HOME | End: 2024-05-31
Attending: OBSTETRICS & GYNECOLOGY | Admitting: OBSTETRICS & GYNECOLOGY
Payer: COMMERCIAL

## 2024-05-30 ENCOUNTER — APPOINTMENT (OUTPATIENT)
Dept: WOMENS IMAGING | Facility: HOSPITAL | Age: 39
End: 2024-05-30
Payer: COMMERCIAL

## 2024-05-30 PROBLEM — O13.9 GESTATIONAL HYPERTENSION: Status: ACTIVE | Noted: 2024-05-30

## 2024-05-30 LAB
ABO GROUP BLD: NORMAL
ALP SERPL-CCNC: 111 U/L (ref 39–117)
ALP SERPL-CCNC: 122 U/L (ref 39–117)
ALT SERPL W P-5'-P-CCNC: 46 U/L (ref 1–33)
ALT SERPL W P-5'-P-CCNC: 49 U/L (ref 1–33)
AST SERPL-CCNC: 25 U/L (ref 1–32)
AST SERPL-CCNC: 28 U/L (ref 1–32)
BILIRUB SERPL-MCNC: 0.3 MG/DL (ref 0–1.2)
BILIRUB SERPL-MCNC: 0.4 MG/DL (ref 0–1.2)
BLD GP AB SCN SERPL QL: NEGATIVE
CREAT SERPL-MCNC: 0.54 MG/DL (ref 0.57–1)
CREAT SERPL-MCNC: 0.58 MG/DL (ref 0.57–1)
DEPRECATED RDW RBC AUTO: 43 FL (ref 37–54)
DEPRECATED RDW RBC AUTO: 44.2 FL (ref 37–54)
ERYTHROCYTE [DISTWIDTH] IN BLOOD BY AUTOMATED COUNT: 12.7 % (ref 12.3–15.4)
ERYTHROCYTE [DISTWIDTH] IN BLOOD BY AUTOMATED COUNT: 12.9 % (ref 12.3–15.4)
HCT VFR BLD AUTO: 29.1 % (ref 34–46.6)
HCT VFR BLD AUTO: 29.7 % (ref 34–46.6)
HGB BLD-MCNC: 10.1 G/DL (ref 12–15.9)
HGB BLD-MCNC: 10.1 G/DL (ref 12–15.9)
LDH SERPL-CCNC: 153 U/L (ref 135–214)
LDH SERPL-CCNC: 285 U/L (ref 135–214)
MCH RBC QN AUTO: 32.9 PG (ref 26.6–33)
MCH RBC QN AUTO: 33 PG (ref 26.6–33)
MCHC RBC AUTO-ENTMCNC: 34 G/DL (ref 31.5–35.7)
MCHC RBC AUTO-ENTMCNC: 34.7 G/DL (ref 31.5–35.7)
MCV RBC AUTO: 94.8 FL (ref 79–97)
MCV RBC AUTO: 97.1 FL (ref 79–97)
PLATELET # BLD AUTO: 214 10*3/MM3 (ref 140–450)
PLATELET # BLD AUTO: 221 10*3/MM3 (ref 140–450)
PMV BLD AUTO: 10.6 FL (ref 6–12)
PMV BLD AUTO: 10.9 FL (ref 6–12)
RBC # BLD AUTO: 3.06 10*6/MM3 (ref 3.77–5.28)
RBC # BLD AUTO: 3.07 10*6/MM3 (ref 3.77–5.28)
RH BLD: POSITIVE
T PALLIDUM IGG SER QL: NORMAL
T&S EXPIRATION DATE: NORMAL
URATE SERPL-MCNC: 5.5 MG/DL (ref 2.4–5.7)
URATE SERPL-MCNC: 6.6 MG/DL (ref 2.4–5.7)
WBC NRBC COR # BLD AUTO: 8.53 10*3/MM3 (ref 3.4–10.8)
WBC NRBC COR # BLD AUTO: 8.55 10*3/MM3 (ref 3.4–10.8)

## 2024-05-30 PROCEDURE — 76819 FETAL BIOPHYS PROFIL W/O NST: CPT | Performed by: OBSTETRICS & GYNECOLOGY

## 2024-05-30 PROCEDURE — 76816 OB US FOLLOW-UP PER FETUS: CPT

## 2024-05-30 PROCEDURE — 25010000002 BETAMETHASONE ACET & SOD PHOS PER 4 MG: Performed by: OBSTETRICS & GYNECOLOGY

## 2024-05-30 PROCEDURE — 84460 ALANINE AMINO (ALT) (SGPT): CPT | Performed by: OBSTETRICS & GYNECOLOGY

## 2024-05-30 PROCEDURE — 84460 ALANINE AMINO (ALT) (SGPT): CPT | Performed by: STUDENT IN AN ORGANIZED HEALTH CARE EDUCATION/TRAINING PROGRAM

## 2024-05-30 PROCEDURE — 86901 BLOOD TYPING SEROLOGIC RH(D): CPT | Performed by: OBSTETRICS & GYNECOLOGY

## 2024-05-30 PROCEDURE — 86780 TREPONEMA PALLIDUM: CPT | Performed by: OBSTETRICS & GYNECOLOGY

## 2024-05-30 PROCEDURE — 84450 TRANSFERASE (AST) (SGOT): CPT | Performed by: OBSTETRICS & GYNECOLOGY

## 2024-05-30 PROCEDURE — 84450 TRANSFERASE (AST) (SGOT): CPT | Performed by: STUDENT IN AN ORGANIZED HEALTH CARE EDUCATION/TRAINING PROGRAM

## 2024-05-30 PROCEDURE — 99222 1ST HOSP IP/OBS MODERATE 55: CPT | Performed by: OBSTETRICS & GYNECOLOGY

## 2024-05-30 PROCEDURE — 76820 UMBILICAL ARTERY ECHO: CPT | Performed by: OBSTETRICS & GYNECOLOGY

## 2024-05-30 PROCEDURE — 59025 FETAL NON-STRESS TEST: CPT

## 2024-05-30 PROCEDURE — 86850 RBC ANTIBODY SCREEN: CPT | Performed by: OBSTETRICS & GYNECOLOGY

## 2024-05-30 PROCEDURE — 76816 OB US FOLLOW-UP PER FETUS: CPT | Performed by: OBSTETRICS & GYNECOLOGY

## 2024-05-30 PROCEDURE — 85027 COMPLETE CBC AUTOMATED: CPT | Performed by: OBSTETRICS & GYNECOLOGY

## 2024-05-30 PROCEDURE — 82565 ASSAY OF CREATININE: CPT | Performed by: OBSTETRICS & GYNECOLOGY

## 2024-05-30 PROCEDURE — 83615 LACTATE (LD) (LDH) ENZYME: CPT | Performed by: OBSTETRICS & GYNECOLOGY

## 2024-05-30 PROCEDURE — 83615 LACTATE (LD) (LDH) ENZYME: CPT | Performed by: STUDENT IN AN ORGANIZED HEALTH CARE EDUCATION/TRAINING PROGRAM

## 2024-05-30 PROCEDURE — 82565 ASSAY OF CREATININE: CPT | Performed by: STUDENT IN AN ORGANIZED HEALTH CARE EDUCATION/TRAINING PROGRAM

## 2024-05-30 PROCEDURE — 36415 COLL VENOUS BLD VENIPUNCTURE: CPT | Performed by: OBSTETRICS & GYNECOLOGY

## 2024-05-30 PROCEDURE — 82247 BILIRUBIN TOTAL: CPT | Performed by: STUDENT IN AN ORGANIZED HEALTH CARE EDUCATION/TRAINING PROGRAM

## 2024-05-30 PROCEDURE — 76819 FETAL BIOPHYS PROFIL W/O NST: CPT

## 2024-05-30 PROCEDURE — 76820 UMBILICAL ARTERY ECHO: CPT

## 2024-05-30 PROCEDURE — 85027 COMPLETE CBC AUTOMATED: CPT | Performed by: STUDENT IN AN ORGANIZED HEALTH CARE EDUCATION/TRAINING PROGRAM

## 2024-05-30 PROCEDURE — 86900 BLOOD TYPING SEROLOGIC ABO: CPT | Performed by: OBSTETRICS & GYNECOLOGY

## 2024-05-30 PROCEDURE — 84550 ASSAY OF BLOOD/URIC ACID: CPT | Performed by: STUDENT IN AN ORGANIZED HEALTH CARE EDUCATION/TRAINING PROGRAM

## 2024-05-30 PROCEDURE — 82247 BILIRUBIN TOTAL: CPT | Performed by: OBSTETRICS & GYNECOLOGY

## 2024-05-30 PROCEDURE — 84550 ASSAY OF BLOOD/URIC ACID: CPT | Performed by: OBSTETRICS & GYNECOLOGY

## 2024-05-30 PROCEDURE — 84075 ASSAY ALKALINE PHOSPHATASE: CPT | Performed by: OBSTETRICS & GYNECOLOGY

## 2024-05-30 PROCEDURE — 84075 ASSAY ALKALINE PHOSPHATASE: CPT | Performed by: STUDENT IN AN ORGANIZED HEALTH CARE EDUCATION/TRAINING PROGRAM

## 2024-05-30 RX ORDER — DOCUSATE SODIUM 100 MG/1
100 CAPSULE, LIQUID FILLED ORAL 2 TIMES DAILY PRN
Status: DISCONTINUED | OUTPATIENT
Start: 2024-05-30 | End: 2024-05-31 | Stop reason: HOSPADM

## 2024-05-30 RX ORDER — ONDANSETRON 4 MG/1
8 TABLET, ORALLY DISINTEGRATING ORAL EVERY 8 HOURS PRN
Status: DISCONTINUED | OUTPATIENT
Start: 2024-05-30 | End: 2024-05-31 | Stop reason: HOSPADM

## 2024-05-30 RX ORDER — ONDANSETRON 2 MG/ML
4 INJECTION INTRAMUSCULAR; INTRAVENOUS EVERY 8 HOURS PRN
Status: DISCONTINUED | OUTPATIENT
Start: 2024-05-30 | End: 2024-05-31 | Stop reason: HOSPADM

## 2024-05-30 RX ORDER — BISACODYL 10 MG
10 SUPPOSITORY, RECTAL RECTAL DAILY PRN
Status: DISCONTINUED | OUTPATIENT
Start: 2024-05-30 | End: 2024-05-31 | Stop reason: HOSPADM

## 2024-05-30 RX ORDER — LIDOCAINE HYDROCHLORIDE 10 MG/ML
0.5 INJECTION, SOLUTION EPIDURAL; INFILTRATION; INTRACAUDAL; PERINEURAL ONCE AS NEEDED
Status: DISCONTINUED | OUTPATIENT
Start: 2024-05-30 | End: 2024-05-31 | Stop reason: HOSPADM

## 2024-05-30 RX ORDER — BETAMETHASONE SODIUM PHOSPHATE AND BETAMETHASONE ACETATE 3; 3 MG/ML; MG/ML
12 INJECTION, SUSPENSION INTRA-ARTICULAR; INTRALESIONAL; INTRAMUSCULAR; SOFT TISSUE EVERY 24 HOURS
Status: COMPLETED | OUTPATIENT
Start: 2024-05-30 | End: 2024-05-31

## 2024-05-30 RX ORDER — ACETAMINOPHEN 325 MG/1
650 TABLET ORAL EVERY 4 HOURS PRN
Status: DISCONTINUED | OUTPATIENT
Start: 2024-05-30 | End: 2024-05-31 | Stop reason: HOSPADM

## 2024-05-30 RX ADMIN — BETAMETHASONE ACETATE AND BETAMETHASONE SODIUM PHOSPHATE 12 MG: 3; 3 INJECTION, SUSPENSION INTRA-ARTICULAR; INTRALESIONAL; INTRAMUSCULAR; SOFT TISSUE at 02:10

## 2024-05-30 RX ADMIN — ACETAMINOPHEN 650 MG: 325 TABLET ORAL at 08:06

## 2024-05-30 NOTE — H&P
"[facility]  Obstetric History and Physical    Chief Complaint   Patient presents with    Headache    Elevated Blood Pressure     HPI:      Patient is a 38 y.o. female  currently at 32w2d, who presents after developing a headache for which made her take her blood pressure.   She noted it to be elevated at home.    The headache improved some, but her BPs remained elevated here.    She denies RUQ pain and vision changes.    She is further complicated by Di/Di twins.        The following portions of the patients history were reviewed and updated as appropriate: current medications, allergies, past medical history, past surgical history, past family history, past social history and problem list .       Prenatal Information:   Maternal Prenatal Labs  Blood Type ABO Type   Date Value Ref Range Status   2024 O  Final      Rh Status RH type   Date Value Ref Range Status   2024 Positive  Final      Antibody Screen Antibody Screen   Date Value Ref Range Status   2024 Negative  Final      Gonnorhea No results found for: \"GCCX\"   Chlamydia No results found for: \"CLAMYDCU\"   RPR No results found for: \"RPR\"   Syphilis Antibody No results found for: \"SYPHILIS\"   Rubella No results found for: \"RUBELLAIGGIN\"   Hepatitis B Surface Antigen No results found for: \"HEPBSAG\"   HIV-1 Antibody No results found for: \"LABHIV1\"   Hepatitis C Antibody No results found for: \"HEPCAB\"   Rapid Urin Drug Screen No results found for: \"AMPMETHU\", \"BARBITSCNUR\", \"LABBENZSCN\", \"LABMETHSCN\", \"LABOPIASCN\", \"THCURSCR\", \"COCAINEUR\", \"AMPHETSCREEN\", \"PROPOXSCN\", \"BUPRENORSCNU\", \"METAMPSCNUR\", \"OXYCODONESCN\", \"TRICYCLICSCN\"   Group B Strep Culture No results found for: \"GBSANTIGEN\"           External Prenatal Results       Pregnancy Outside Results - Transcribed From Office Records - See Scanned Records For Details       Test Value Date Time    ABO  O  24 0004    Rh  Positive  24 0004    Antibody Screen  Negative  " 24 0004       Negative  23 1739    Varicella IgG       Rubella  5.82 index 23 1739    Hgb  10.1 g/dL 24 0004       11.5 g/dL 24       12.2 g/dL 24       12.3 g/dL 23 1739    Hct  29.7 % 24 0004       32.7 % 24       34.9 % 24       35.5 % 23 1739    Glucose Fasting GTT       Glucose Tolerance Test 1 hour       Glucose Tolerance Test 3 hour       Gonorrhea (discrete)  Negative  23 1739    Chlamydia (discrete)  Negative  23 1739    RPR       VDRL       Syphilis Antibody       HBsAg  Non-Reactive  23 1739    Herpes Simplex Virus PCR       Herpes Simplex VIrus Culture       HIV  Non-Reactive  23 1739    Hep C RNA Quant PCR       Hep C Antibody  Non-Reactive  23 1739    AFP       Group B Strep       GBS Susceptibility to Clindamycin       GBS Susceptibility to Erythromycin       Fetal Fibronectin       Genetic Testing, Maternal Blood                 Drug Screening       Test Value Date Time    NIPT        Urine Drug Screen       Amphetamine Screen  Negative  23 1739    Barbiturate Screen  Negative  23 1739    Benzodiazepine Screen  Negative  23 1739    Methadone Screen  Negative  23 1739    Phencyclidine Screen  Negative  23 1739    Opiates Screen  Negative  23 1739    THC Screen  Negative  23 1739    Cocaine Screen       Propoxyphene Screen       Buprenorphine Screen  Negative  23 1739    Methamphetamine Screen       Oxycodone Screen  Negative  23 1739              Legend    ^: Historical                              Past OB History:     OB History    Para Term  AB Living   7 4 4 0 2 4   SAB IAB Ectopic Molar Multiple Live Births   2 0 0 0 0 4      # Outcome Date GA Lbr Noah/2nd Weight Sex Type Anes PTL Lv   7 Current            6 Term 23 38w4d 15:53 / 00:17 3790 g (8 lb 5.7 oz) M Vag-Spont EPI N JONN      Name: Chapito      Apgar1: 8   Apgar5: 9   5 Term 06/14/16 37w4d  3260 g (7 lb 3 oz) M Vag-Spont EPI  JONN      Complications: Pregnancy-induced hypertension      Name: Chente   4 Term 04/04/15 38w2d  3459 g (7 lb 10 oz) F Vag-Spont EPI  JONN      Complications: Pregnancy-induced hypertension, Preeclampsia in postpartum period, Pulmonary edema      Name: Carrington   3 SAB 2014 6w0d          2 SAB 2013           1 Term 07/08/10 39w0d  2977 g (6 lb 9 oz) M Vag-Spont EPI  JONN      Name: Mayelin      Obstetric Comments   2010 - Mayelin (FOB #1)   2015 - Carrington (FOB #2)   2016 - Chente (FOB #3)   2023 - Chapito (FOB #3)   Current  (FOB #3)       Past Medical History: Past Medical History:   Diagnosis Date    History of gestational diabetes - with 4th pregnancy 2023    Hx of postpatum cardiomyopathy 2023    LGSIL on Pap smear of cervix 07/12/2023    Tx wtih LEEP    Postpartum anxiety 2023    Preeclampsia in postpartum period 02/2015    Along with pulmonary edema      Past Surgical History Past Surgical History:   Procedure Laterality Date    LEEP  09/2023    Path = FELICIA 2 with clear margins      Family History: Family History   Problem Relation Age of Onset    Hypertension Mother     Diabetes type II Father     Hypertension Father     Other Son         bifid uvula and polydactyly    Breast cancer Neg Hx     Colon cancer Neg Hx     Ovarian cancer Neg Hx       Social History:  reports that she has never smoked. She has never been exposed to tobacco smoke. She has never used smokeless tobacco.   reports that she does not currently use alcohol after a past usage of about 2.0 standard drinks of alcohol per week.   reports no history of drug use.        Review of Systems  Denies fever, CP, Shortness of air, muscle weakness, and rashes      Objective     Vital Signs Range for the last 24 hours  Temperature: Temp:  [98.7 °F (37.1 °C)] 98.7 °F (37.1 °C)   Temp Source: Temp src: Oral   BP: BP: (124-142)/(77-95) 124/95   Pulse: Heart Rate:  [100] 100   Respirations:  Resp:  [16] 16   SPO2:     O2 Amount (l/min):     O2 Devices     Weight: Weight:  [86.2 kg (190 lb)] 86.2 kg (190 lb)     Physical Examination: General appearance - alert, well appearing, and in no distress and oriented to person, place, and time  Chest -Breathing is unalboured   Heart - Slight murmur - regular rate   Abdomen - soft, nontender, nondistended,   no rebound tenderness noted  No guarding, No RUQ pain  Extremities - pedal edema +1      Fetal Heart Rate Assessment   Method: Fetal HR Assessment Method: external   Beats/min: Fetal HR (beats/min): 130   Baseline: Fetal HR Baseline: normal range   Varibility: Fetal HR Variability: moderate (amplitude range 6 to 25 bpm)   Accels: Fetal HR Accelerations: greater than/equal to 15 bpm, lasting at least 15 seconds   Decels: Fetal HR Decelerations: absent   Tracing Category:       Uterine Assessment   Method: Method: external tocotransducer   Frequency (min):     Ctx Count in 10 min:     Duration:     Intensity: Contraction Intensity: no contractions   Intensity by IUPC:     Resting Tone:     Resting Tone by IUPC:           Laboratory Results:   Lab Results   Component Value Date     05/30/2024    HGB 10.1 (L) 05/30/2024    HCT 29.7 (L) 05/30/2024    WBC 8.53 05/30/2024      Lab Results   Component Value Date    HGB 10.1 (L) 05/30/2024     05/30/2024    AST 28 05/30/2024    ALT 46 (H) 05/30/2024     (H) 05/30/2024    URICACID 5.5 05/30/2024    BUN 9 05/18/2024    CREATININE 0.54 (L) 05/30/2024    GLUCOSE 119 (H) 05/18/2024          Assessment:  1. Intrauterine pregnancy at 32w2d weeks gestation with reactive fetal status  2.  Newly Dx of GHTN - R/O pre-eclampsia   3.  Di/Di twins       Plan:  1. Admit to [facility]U  2. Seriel BPs  3. BMZ x2  4. TID monitoring  5. 24 hour urine protein   6. PDC scan  7. Regular diet.     Zak Espitia MD  5/30/2024  01:20 EDT

## 2024-05-30 NOTE — PROGRESS NOTES
TERRI Licona  Antepartum Progress Note    Chief Complaint: Elevated Blood Pressure    HD#2    Subjective     Patient reports doing okay this am. Reports mild headache but relieved with tylenol. Denies CP, SOA, RUQ pain. Reports good FM. Denies VB, LOF.     Objective     Vital Signs Range for the last 24 hours  Temp:  [98 °F (36.7 °C)-98.7 °F (37.1 °C)] 98 °F (36.7 °C)   BP: (124-142)/(71-95) 134/80   Heart Rate:  [] 86   Resp:  [16-18] 16           Device (Oxygen Therapy): room air     Fetal Heart Rate Assessment   Method: Fetal HR Assessment Method: external   Beats/min: Fetal HR (beats/min): 135   Baseline: Fetal HR Baseline: normal range   Varibility: Fetal HR Variability: moderate (amplitude range 6 to 25 bpm)   Accels: Fetal HR Accelerations: greater than/equal to 15 bpm, lasting at least 15 seconds   Decels: Fetal HR Decelerations: absent   Tracing Category:       Uterine Assessment   Method: Method: external tocotransducer   Frequency (min): Contraction Frequency (Minutes): x1   Ctx Count in 10 min:     Duration:     Intensity: Contraction Intensity: no contractions   Intensity by IUPC:     Resting Tone:     Resting Tone by IUPC:       Cervix: Exam by:     Dilation:     Effacement:     Station:              Intake/Output last 24 hours:    No intake or output data in the 24 hours ending 05/30/24 1008    Intake/Output this shift:    No intake/output data recorded.    Physical Exam:  General: Patient is comfortable, well appearing, and in no acute distress   Heart CVS exam: trace edema bilaterally, equal.   Lungs Chest: unlabored breathing   Abdomen Gravid, soft, nontender   Extremities ROM intact bilaterally       Laboratory Results    WBC   Date Value Ref Range Status   05/30/2024 8.53 3.40 - 10.80 10*3/mm3 Final     RBC   Date Value Ref Range Status   05/30/2024 3.06 (L) 3.77 - 5.28 10*6/mm3 Final     Hemoglobin   Date Value Ref Range Status   05/30/2024 10.1 (L) 12.0 - 15.9 g/dL Final     Hematocrit    Date Value Ref Range Status   2024 29.7 (L) 34.0 - 46.6 % Final     MCV   Date Value Ref Range Status   2024 97.1 (H) 79.0 - 97.0 fL Final     MCH   Date Value Ref Range Status   2024 33.0 26.6 - 33.0 pg Final     MCHC   Date Value Ref Range Status   2024 34.0 31.5 - 35.7 g/dL Final     RDW   Date Value Ref Range Status   2024 12.9 12.3 - 15.4 % Final     RDW-SD   Date Value Ref Range Status   2024 44.2 37.0 - 54.0 fl Final     MPV   Date Value Ref Range Status   2024 10.6 6.0 - 12.0 fL Final     Platelets   Date Value Ref Range Status   2024 221 140 - 450 10*3/mm3 Final     LDH   Date Value Ref Range Status   2024 285 (H) 135 - 214 U/L Final     Comment:     Specimen hemolyzed.  Results may be affected.     Uric Acid   Date Value Ref Range Status   2024 5.5 2.4 - 5.7 mg/dL Final     Lab Results   Component Value Date    GLUCOSE 119 (H) 2024    BUN 9 2024    CREATININE 0.54 (L) 2024    EGFRRESULT 124.8 05/10/2023    EGFR 114.4 2024    BCR 12.9 2024    K 3.8 2024    CO2 18.0 (L) 2024    CALCIUM 8.4 (L) 2024    ALBUMIN 3.3 (L) 2024    BILITOT 0.3 2024    AST 28 2024    ALT 46 (H) 2024       Ultrasound Results:   [facility]    Result Date: 2024  PAT NAME: KVNG LUNA MED REC#: [id number] BIRTH DA: [id number] PAT GEND: F ACCOUNT#: [id number] PAT TYPE: I EXAM VINCENT: [id number] REF KVNG HER ACCESSION [id number] Comparison Studies ================= The findings of this study are compared to the prior ultrasound study dated 24 Patient Status ============ Inpatient Indication ======== Di/Di twins. New GHTN/preeclampsia. AMA. Hx preeclampsia. Hx cardiomyopathy. Hx LEEP. Obesity BMI 32. Maternal Assessment ================== Height 162 cm Height (ft) 5 ft Height (in) 4 in Weight 86 kg Weight (lb) 190 lb BMI 32.84 kg/m² Method ======= Transabdominal  ultrasound examination. View: Adequate view Pregnancy ========= Twin pregnancy. Dichorionic-diamniotic. Number of fetuses: 2 Dating ====== Method of dating: based on stated SUMMER GA by prior assessment 32 w + 2 d SUMMER by prior assessment: 2024 Ultrasound examination on: 2024 GA by U/S based upon: AC, BPD, Femur, HC GA by U/S 31 w + 6 d SUMMER by U/S: 2024 GA by U/S based upon (Fetus 2): AC, BPD, Femur, HC GA by U/S (Fetus 2) 32 w + 5 d SUMMER by U/S (Fetus 2): 2024 Previous dating: based on stated SUMMER, selected on 2024 Agreed SUMMER of previous datin2024 Assigned: based on stated SUMMER, selected on 2024 Assigned GA 32 w + 2 d Assigned USMMER: 2024 Pregnancy length 280 d Fetal Biometry ============ Standard BPD 75.7 mm 30w 3d        4%        Hadlock .9 mm 35w 2d        96%        Geovanna .6 mm 32w 4d        20%        Hadlock Cerebellum tr 39.0 mm 31w 4d        15%        Hill .7 mm 31w 2d        23%        Hadlock Femur 64.4 mm 33w 2d        64%        Hadlock Humerus 54.9 mm 32w 0d        44%        Geovanna HC / AC 1.08 EFW 1,875 g         41%        Alfredo EFW discordance 5.4 % EFW (lb) 4 lb EFW (oz) 2 oz EFW by: Hadlock (BPD-HC-AC-FL) Extended Cav. septi pel. tr 5.1 mm  5.3 mm CM 5.3 mm         6%        Nicolaides Head / Face / Neck Cephalic index 0.71         <1%        Nicolaides Extremities / Bony Struc FL / BPD 0.85 FL / HC 0.22 FL / AC 0.24 Other Structures  bpm Fetal Biometry ============ Standard BPD 80.6 mm 32w 3d        44%        Hadlock .7 mm 35w 2d        95%        Geovanna .7 mm 33w 5d        52%        Hadlock Cerebellum tr 38.2 mm 31w 1d        9%        Hill .7 mm 32w 4d        59%        Hadlock Femur 61.4 mm 31w 6d        26%        Hadlock Humerus 53.7 mm 31w 2d        26%        Geovanna HC / AC 1.06 EFW 1,982 g         50%        Alfredo EFW discordance 5.4 % EFW (lb) 4 lb EFW (oz) 6 oz EFW by: Hadlock (BPD-HC-AC-FL)  Extended Cav. septi pel. tr 4.9 mm  4.9 mm CM 8.6 mm         83%        Nicolaides Head / Face / Neck Cephalic index 0.76         11%        Nicolaides Extremities / Bony Struc FL / BPD 0.76 FL / HC 0.20 FL / AC 0.21 Other Structures  bpm General Evaluation ================ Cardiac activity present.  bpm. Fetal movements present. Presentation cephalic, maternal left. Placenta Placental site: posterior. Amniotic fluid Amount of AF: normal. MVP 4.7 cm. General Evaluation ================ Cardiac activity present.  bpm. Fetal movements present. Presentation cephalic, maternal right. Placenta Placental site: anterior. Amniotic fluid Amount of AF: normal. MVP 4.2 cm. Fetal Anatomy ============ Cranium: Normal Cavum septi pellucidi: Normal Cerebellum: Normal Cisterna magna: Normal Head / Neck Rt lateral ventricle: Normal Lt lateral ventricle: Normal 4-chamber view: Appears normal RVOT view: Appears normal LVOT view: Appears normal Stomach: Appears normal Kidneys: Appears normal Bladder: Appears normal Gender: female Wants to know gender: yes Fetal Anatomy ============ Cranium: Normal Cavum septi pellucidi: Normal Cerebellum: Normal Cisterna magna: Normal Head / Neck Rt lateral ventricle: Normal Lt lateral ventricle: Normal Profile: Normal 4-chamber view: Appears normal RVOT view: Appears normal LVOT view: Appears normal Stomach: Appears normal Kidneys: Appears normal Bladder: Appears normal Gender: female Wants to know gender: yes Fetal Doppler =========== Arterial Umbilical A PI 1.19         92%        Ranjan Umbilical A RI 0.71         89%        Ranjan Umbilical A PS -53.80 cm/s Umbilical A ED 15.13 cm/s Umbilical A TAmax -33.01 cm/s Umbilical A MD 13.12 cm/s Umbilical A S / D 3.54         89%        Ranjan Umbilical A  bpm Fetal Doppler =========== Arterial Umbilical A PI 1.08         81%        Ranjan Umbilical A RI 0.67         77%        Ranjan Umbilical A PS 36.71 cm/s         6%         Ebbing Umbilical A ED 12.13 cm/s Umbilical A TAmax 22.76 cm/s         4%        Ebbing Umbilical A MD 11.86 cm/s Umbilical A S / D 3.03         70%        Ranjan Umbilical A  bpm Biophysical Profile =============== 2: Fetal breathing movements 2: Gross body movements 2: Fetal tone 2: Amniotic fluid volume 8/8 Biophysical profile score Biophysical Profile =============== 2: Fetal breathing movements 2: Gross body movements 2: Fetal tone 2: Amniotic fluid volume 8/8 Biophysical profile score Impression ========= Size consistent with dates both twins. No fetal anomalies were identified. Amniotic fluid volume is normal for both twins. Umbilical artery S/D ratio is normal for both twins. Recommendation =============== Follow-up as clinically indicated. Coding ====== Description: 12865-19 Follow Up Ultrasound Description: 08846-56-08 Follow up Ultrasound additional fetus Description: 00609-19 BPP without NST Description: 52610-11-70 BPP without NST additional fetus Description: 04728-29 Doppler Umbilical Artery Description: 66871-75-24 Doppler Umbilical Artery add'l gestation Sonographer: Breonna Early RDMS Physician: Doug Milligan, MD, FACOG Electronically signed by: Doug Milligan, MD, FACOG at: 2024/05/30 08:01    Adult Transthoracic Echo Complete W/ Cont if Necessary Per Protocol    Result Date: 5/19/2024    Left ventricular systolic function is normal. Calculated left ventricular EF = 55.5% Left ventricular ejection fraction appears to be 56 - 60%.   The left atrial cavity is dilated.   Left atrial volume is moderately increased.   Aortic valve area is 1.9 cm2.   Peak velocity of the flow distal to the aortic valve is 208.5 cm/s. Aortic valve mean pressure gradient is 10 mmHg.   Estimated right ventricular systolic pressure from tricuspid regurgitation is normal (<35 mmHg).   There is an eccentric jet of mitral regurgitation which is mild to moderate but may be worse. The left atrium is dilated suggesting  it may be worse. Suggest clinical correlation with symptoms     [facility]    Result Date: 2024  PAT NAME: KVNG LUNA MED REC#: [id number] BIRTH DA: 1985 PAT GEND: F ACCOUNT#: [id number] PAT TYPE: O EXAM VINCENT: [id number] REF KVNG HER ACCESSION [id number] Comparison Studies ================= The findings of this study are compared to the prior ultrasound study dated 24 Patient Status ============ Outpatient Indication ======== Di/Di twins. AMA. Hx preeclampsia. Hx cardiomyopathy. Hx LEEP. Obesity BMI 33. Maternal Assessment ================== Height 162 cm Height (ft) 5 ft Height (in) 4 in Weight 87 kg Weight (lb) 192 lb BMI 33.19 kg/m² Method ======= Transabdominal ultrasound examination. View: Adequate view Pregnancy ========= Twin pregnancy. Dichorionic-diamniotic. Number of fetuses: 2 Dating ====== Method of dating: based on stated SUMMER GA by prior assessment 29 w + 2 d SUMMER by prior assessment: 2024 Ultrasound examination on: 2024 GA by U/S based upon: AC, BPD, Femur, HC GA by U/S 28 w + 5 d SUMMER by U/S: 2024 GA by U/S based upon (Fetus 2): AC, BPD, Femur, HC GA by U/S (Fetus 2) 29 w + 2 d SUMMER by U/S (Fetus 2): 2024 Previous dating: based on stated SUMMER, selected on 2024 Agreed SUMMER of previous datin2024 Assigned: based on stated SUMMER, selected on 2024 Assigned GA 29 w + 2 d Assigned SUMMER: 2024 Pregnancy length 280 d Fetal Biometry ============ Standard BPD 68.7 mm 27w 4d        4%        Hadlock OFD 90.0 mm 29w 0d        42%        Geovanna .1 mm 28w 1d        3%        Hadlock Cerebellum tr 35.7 mm 29w 5d        66%        Hill .8 mm 29w 1d        41%        Hadlock Femur 56.8 mm 29w 6d        50%        Hadlock Humerus 51.1 mm 29w 6d        62%        Geovanna HC / AC 1.04 EFW 1,349 g         50%        Alfredo EFW discordance 1.8 % EFW (lb) 3 lb EFW (oz) 0 oz EFW by: Hadlock (BPD-HC-AC-FL) Extended Cav. septi  pel. tr 5.2 mm  3.7 mm CM 8.0 mm         79%        Nicolaides Head / Face / Neck Cephalic index 0.76         22%        Nicolaides Extremities / Bony Struc FL / BPD 0.83 FL / HC 0.22 FL / AC 0.23 Other Structures  bpm Fetal Biometry ============ Standard BPD 72.8 mm 29w 1d        35%        Hadlock OFD 96.4 mm 31w 1d        90%        Geovanna .4 mm 29w 5d        29%        Hadlock Cerebellum tr 35.9 mm 29w 6d        70%        Hill .1 mm 29w 5d        59%        Hadlock Femur 53.8 mm 28w 4d        16%        Hadlock Humerus 49.1 mm 28w 6d        31%        Geovanna HC / AC 1.06 EFW 1,373 g         52%        Alfredo EFW discordance 1.8 % EFW (lb) 3 lb EFW (oz) 0 oz EFW by: Hadlock (BPD-HC-AC-FL) Extended Cav. septi pel. tr 5.6 mm  3.9 mm CM 7.2 mm         59%        Nicolaides Head / Face / Neck Cephalic index 0.76         15%        Nicolaides Extremities / Bony Struc FL / BPD 0.74 FL / HC 0.20 FL / AC 0.21 Other Structures  bpm General Evaluation ================ Cardiac activity present.  bpm. Fetal movements present. Presentation cephalic, maternal left. Placenta Placental site: posterior. Amniotic fluid Amount of AF: normal. MVP 4.4 cm. General Evaluation ================ Cardiac activity present.  bpm. Fetal movements present. Presentation cephalic, maternal right. Placenta Placental site: anterior. Amniotic fluid Amount of AF: normal. MVP 3.8 cm. Fetal Anatomy ============ Cranium: Normal Cavum septi pellucidi: Normal Cerebellum: Normal Cisterna magna: Normal Head / Neck Rt lateral ventricle: Normal Lt lateral ventricle: Normal 4-chamber view: Appears normal RVOT view: Appears normal LVOT view: Appears normal Stomach: Appears normal Kidneys: Appears normal Bladder: Appears normal Gender: female Wants to know gender: yes Fetal Anatomy ============ Cranium: Normal Cavum septi pellucidi: Normal Cerebellum: Normal Cisterna magna: Normal Head / Neck Rt lateral  ventricle: Normal Lt lateral ventricle: Normal Profile: Normal 4-chamber view: Appears normal RVOT view: Appears normal LVOT view: Appears normal Stomach: Appears normal Kidneys: Appears normal Bladder: Appears normal Gender: female Wants to know gender: yes Maternal Structures ================ Uterus / Cervix Approach: Transabdominal Cervical length 43.7 mm Fetal Doppler =========== Arterial Umbilical A PI 1.06         67%        Ranjan Umbilical A RI 0.66         57%        Ranjan Umbilical A PS -42.28 cm/s Umbilical A ED -14.58 cm/s Umbilical A TAmax -26.10 cm/s Umbilical A MD -14.08 cm/s Umbilical A S / D 2.90         50%        Ranjan Umbilical A  bpm Fetal Doppler =========== Arterial Umbilical A PI 1.21         88%        Ranjan Umbilical A RI 0.74         90%        Ranjan Umbilical A PS -44.08 cm/s Umbilical A ED -11.28 cm/s Umbilical A TAmax -27.05 cm/s Umbilical A MD -11.22 cm/s Umbilical A S / D 3.91         90%        Ranjan Umbilical A  bpm Consultation / Office Visit ==================== Office note to follow Impression ========= Di/Di twin gestation VTX/VTX Appropriate and concordant growth Normal appearing limited anatomy, fluid and UA doppler x 2 Coding ====== Description: 83640-39 Follow Up Ultrasound Description: 99512-27-92 Follow up Ultrasound additional fetus Sonographer: Breonna Early RDMS Physician: Manisha Addison MD, FACOG Electronically signed by: Manisha Addison MD, FACOG at: 2024/05/13 15:56         Assessment/Plan  Di/Di Twin Gestation @ 32w2d  GHTN-r/o PreE  History of postpartum cardiomyopathy  AMA       1. Management on APU    2. Gestation Hypertension-R/o PreE  Currently undergoing 24 hour urine-complete 5/31 0300  BP normotensive this morning  Labs on intake-ALT 46 mild elevation, others WNL not meeting criteria for severe features  Mild range HA-relieved with tylenol. Asymptomatic otherwise  S/p ANCS for FLM- 5/30 0210. Repeat in 24 hours  US this am  with PDC as listed above, BPP 8/8 x2, normal fluid x2, UAD WNL x2    3. FWB-Di/Di Twin gestation  Complete ANCS  TID monitoring  PDC showed reassuring fetal status this am    4. Diet  Regular    5. Activity  Ad ludmila    6. DVT PPX  SCDs      Dispo: Continue care on APU through 24 hour urine collection. If normal and BP remain stable consider DC home tomorrow following ANCS completion        Geneva Mcneal DO  5/30/2024  10:08 EDT

## 2024-05-31 VITALS
DIASTOLIC BLOOD PRESSURE: 79 MMHG | TEMPERATURE: 98.4 F | HEIGHT: 63 IN | WEIGHT: 192 LBS | RESPIRATION RATE: 18 BRPM | SYSTOLIC BLOOD PRESSURE: 129 MMHG | HEART RATE: 87 BPM | BODY MASS INDEX: 34.02 KG/M2

## 2024-05-31 LAB
COLLECT DURATION TIME UR: 24 HRS
PROT 24H UR-MRATE: 282.9 MG/24HOURS (ref 0–150)
SPECIMEN VOL 24H UR: 4100 ML

## 2024-05-31 PROCEDURE — 81050 URINALYSIS VOLUME MEASURE: CPT | Performed by: OBSTETRICS & GYNECOLOGY

## 2024-05-31 PROCEDURE — 25010000002 BETAMETHASONE ACET & SOD PHOS PER 4 MG: Performed by: OBSTETRICS & GYNECOLOGY

## 2024-05-31 PROCEDURE — 84156 ASSAY OF PROTEIN URINE: CPT | Performed by: OBSTETRICS & GYNECOLOGY

## 2024-05-31 RX ADMIN — BETAMETHASONE ACETATE AND BETAMETHASONE SODIUM PHOSPHATE 12 MG: 3; 3 INJECTION, SUSPENSION INTRA-ARTICULAR; INTRALESIONAL; INTRAMUSCULAR; SOFT TISSUE at 02:14

## 2024-05-31 NOTE — PROGRESS NOTES
TERRI Licona  Antepartum Progress Note    Chief Complaint: Elevated Blood Pressure    HD#3    Subjective     Patient feels very well this morning and is without complaints.  No headache.  Denies CP, SOA, RUQ pain. Reports good FM. Denies VB, LOF.  She would like to go home    Objective     Vital Signs Range for the last 24 hours  Temp:  [98 °F (36.7 °C)-98.4 °F (36.9 °C)] 98.4 °F (36.9 °C)   BP: (112-136)/(56-79) 129/79   Heart Rate:  [] 87   Resp:  [15-18] 18           Device (Oxygen Therapy): room air     Fetal Heart Rate Assessment   Method: Fetal HR Assessment Method: external   Beats/min: Fetal HR (beats/min): 135; 140   Baseline: Fetal HR Baseline: normal range x 2   Varibility: Fetal HR Variability: moderate (amplitude range 6 to 25 bpm) x 2   Accels: Fetal HR Accelerations: greater than/equal to 15 bpm, lasting at least 15 seconds x 2   Decels: Fetal HR Decelerations: absent x 2   Tracing Category:  1 x 2     Uterine Assessment   Method: Method: palpation   Frequency (min): Contraction Frequency (Minutes): x1   Ctx Count in 10 min:     Duration:     Intensity: Contraction Intensity: no contractions   Intensity by IUPC:     Resting Tone: Uterine Resting Tone: soft by palpation   Resting Tone by IUPC:                 Physical Exam:  General: Patient is comfortable, well appearing, and in no acute distress   Heart CVS exam: 1+ edema bilaterally, equal.   Lungs Chest: unlabored breathing   Abdomen Gravid, soft, nontender   Extremities ROM intact bilaterally       Laboratory Results     Component      Latest Ref Rng 5/31/2024   Protein, 24H Urine      0.0 - 150.0 mg/24hours 282.9 (H)    Urine Volume      mL 4,100    Time (Hours)      hrs 24            Ultrasound Results:    No new      Assessment/Plan  Di/Di Twin Gestation @ 32w3d  GHTN   History of postpartum cardiomyopathy  AMA        1. Gestation Hypertension   24 hour urine protein 282.9mg.   Normotensive.  No severe range pressures since  admission  Labs on intake-ALT 46 mild elevation, stable at 49 yesterday, all others WNL. Does not meet criteria for severe features  S/p ANCS for FLM-  and   Discussed starting twice weekly  testing and weekly labs.    3. FWB-Di/Di Twin gestation  Completed ANCS  TID monitoring  PDC ultrasound reassuring    4.  Dispo: Discharge to home.  Strict precautions reviewed.  Starting twice-weekly testing on Monday in the office      Karen Osorio MD  2024  08:47 EDT

## 2024-05-31 NOTE — DISCHARGE SUMMARY
[facility]   Discharge Summary      Patient: Karen Garnica      MR#:[id number]  Admission  Diagnosis:   Elevated BP  Di-di twin gestation  H/o cardiomyopathy      Discharge Diagnosis: same + GHTN    Date of Admission: 2024  Date of Discharge:  2024    Procedures:  none    Service:  Obstetrics    Hospital Course:  Patient admitted for hypertension evaluation.  On presentation she had very mildly elevated blood pressures.  Preeclampsia labs significant for a very mild elevation in AST that remained stable.  She received betamethasone x 2 for fetal lung maturity.  She underwent a 24-hour urine protein collection which returned 283 mg.  She had no symptoms of severe disease.  Plan was made for discharge home after completion of corticosteroids with follow-up scheduled in the office to begin twice weekly  testing with weekly preeclampsia labs. On the day of discharge, she was eating, ambulating and voiding without difficulty.  She has scheduled follow-up with cardiology next week    Labs  Lab Results   Component Value Date    WBC 8.55 2024    HGB 10.1 (L) 2024    HCT 29.1 (L) 2024    MCV 94.8 2024     2024    POCGLU 112 2023    CREATININE 0.58 2024    URICACID 6.6 (H) 2024    AST 25 2024    ALT 49 (H) 2024     2024     Results from last 7 days   Lab Units 24  0004   ABO TYPING  O   RH TYPING  Positive   ANTIBODY SCREEN  Negative       Discharge Medications     Discharge Medications        ASK your doctor about these medications        Instructions Start Date   magnesium (as) gluconate 500 (27 Mg) MG tablet  Commonly known as: MAGONATE   500 mg, Oral, 2 Times Daily      ondansetron ODT 4 MG disintegrating tablet  Commonly known as: ZOFRAN-ODT   4 mg, Oral, As Needed      Prenatal Vitamin 27-0.8 MG tablet   1 tablet, Oral, Daily               Discharge Disposition:  To Home    Discharge Condition:   Stable    Discharge Diet: regular    Activity at Discharge: modified bedrest    Follow-up Appointments  3 days    Karen Osorio MD  05/31/24  09:21 EDT

## 2024-06-03 ENCOUNTER — HOSPITAL ENCOUNTER (INPATIENT)
Facility: HOSPITAL | Age: 39
LOS: 2 days | Discharge: HOME | End: 2024-06-06
Attending: OBSTETRICS & GYNECOLOGY | Admitting: OBSTETRICS & GYNECOLOGY
Payer: COMMERCIAL

## 2024-06-03 PROBLEM — R10.9 FLANK PAIN: Status: ACTIVE | Noted: 2024-06-03

## 2024-06-03 PROBLEM — R50.9 FEVER: Status: ACTIVE | Noted: 2024-06-03

## 2024-06-03 LAB
ABO GROUP BLD: NORMAL
ALBUMIN SERPL-MCNC: 3.1 G/DL (ref 3.5–5.2)
ALBUMIN/GLOB SERPL: 0.9 G/DL
ALP SERPL-CCNC: 119 U/L (ref 39–117)
ALT SERPL W P-5'-P-CCNC: 35 U/L (ref 1–33)
ANION GAP SERPL CALCULATED.3IONS-SCNC: 17 MMOL/L (ref 5–15)
AST SERPL-CCNC: 25 U/L (ref 1–32)
BACTERIA UR QL AUTO: ABNORMAL /HPF
BASOPHILS # BLD AUTO: 0.01 10*3/MM3 (ref 0–0.2)
BASOPHILS NFR BLD AUTO: 0.1 % (ref 0–1.5)
BILIRUB SERPL-MCNC: 0.4 MG/DL (ref 0–1.2)
BILIRUB UR QL STRIP: NEGATIVE
BLD GP AB SCN SERPL QL: NEGATIVE
BUN SERPL-MCNC: 9 MG/DL (ref 6–20)
BUN/CREAT SERPL: 18.8 (ref 7–25)
CALCIUM SPEC-SCNC: 7.9 MG/DL (ref 8.6–10.5)
CHLORIDE SERPL-SCNC: 97 MMOL/L (ref 98–107)
CLARITY UR: ABNORMAL
CO2 SERPL-SCNC: 18 MMOL/L (ref 22–29)
COLOR UR: ABNORMAL
CREAT SERPL-MCNC: 0.48 MG/DL (ref 0.57–1)
D-LACTATE SERPL-SCNC: 0.9 MMOL/L (ref 0.5–2)
DEPRECATED RDW RBC AUTO: 43.8 FL (ref 37–54)
EGFRCR SERPLBLD CKD-EPI 2021: 124.5 ML/MIN/1.73
EOSINOPHIL # BLD AUTO: 0 10*3/MM3 (ref 0–0.4)
EOSINOPHIL NFR BLD AUTO: 0 % (ref 0.3–6.2)
ERYTHROCYTE [DISTWIDTH] IN BLOOD BY AUTOMATED COUNT: 13.2 % (ref 12.3–15.4)
GLOBULIN UR ELPH-MCNC: 3.3 GM/DL
GLUCOSE SERPL-MCNC: 106 MG/DL (ref 65–99)
GLUCOSE UR STRIP-MCNC: NEGATIVE MG/DL
HCT VFR BLD AUTO: 29.2 % (ref 34–46.6)
HGB BLD-MCNC: 10.3 G/DL (ref 12–15.9)
HGB UR QL STRIP.AUTO: ABNORMAL
HYALINE CASTS UR QL AUTO: ABNORMAL /LPF
IMM GRANULOCYTES # BLD AUTO: 0.12 10*3/MM3 (ref 0–0.05)
IMM GRANULOCYTES NFR BLD AUTO: 1 % (ref 0–0.5)
KETONES UR QL STRIP: ABNORMAL
LEUKOCYTE ESTERASE UR QL STRIP.AUTO: ABNORMAL
LYMPHOCYTES # BLD AUTO: 1.43 10*3/MM3 (ref 0.7–3.1)
LYMPHOCYTES NFR BLD AUTO: 11.3 % (ref 19.6–45.3)
MCH RBC QN AUTO: 32.9 PG (ref 26.6–33)
MCHC RBC AUTO-ENTMCNC: 35.3 G/DL (ref 31.5–35.7)
MCV RBC AUTO: 93.3 FL (ref 79–97)
MONOCYTES # BLD AUTO: 0.7 10*3/MM3 (ref 0.1–0.9)
MONOCYTES NFR BLD AUTO: 5.5 % (ref 5–12)
NEUTROPHILS NFR BLD AUTO: 10.36 10*3/MM3 (ref 1.7–7)
NEUTROPHILS NFR BLD AUTO: 82.1 % (ref 42.7–76)
NITRITE UR QL STRIP: POSITIVE
NRBC BLD AUTO-RTO: 0.2 /100 WBC (ref 0–0.2)
PH UR STRIP.AUTO: 5.5 [PH] (ref 5–8)
PLATELET # BLD AUTO: 188 10*3/MM3 (ref 140–450)
PMV BLD AUTO: 11.5 FL (ref 6–12)
POTASSIUM SERPL-SCNC: 3.6 MMOL/L (ref 3.5–5.2)
PROT SERPL-MCNC: 6.4 G/DL (ref 6–8.5)
PROT UR QL STRIP: ABNORMAL
RBC # BLD AUTO: 3.13 10*6/MM3 (ref 3.77–5.28)
RBC # UR STRIP: ABNORMAL /HPF
REF LAB TEST METHOD: ABNORMAL
RH BLD: POSITIVE
SODIUM SERPL-SCNC: 132 MMOL/L (ref 136–145)
SP GR UR STRIP: 1.01 (ref 1–1.03)
SQUAMOUS #/AREA URNS HPF: ABNORMAL /HPF
T&S EXPIRATION DATE: NORMAL
UROBILINOGEN UR QL STRIP: ABNORMAL
WBC # UR STRIP: ABNORMAL /HPF
WBC NRBC COR # BLD AUTO: 12.62 10*3/MM3 (ref 3.4–10.8)

## 2024-06-03 PROCEDURE — 25010000002 CEFTRIAXONE PER 250 MG: Performed by: OBSTETRICS & GYNECOLOGY

## 2024-06-03 PROCEDURE — 85025 COMPLETE CBC W/AUTO DIFF WBC: CPT | Performed by: OBSTETRICS & GYNECOLOGY

## 2024-06-03 PROCEDURE — 87086 URINE CULTURE/COLONY COUNT: CPT | Performed by: STUDENT IN AN ORGANIZED HEALTH CARE EDUCATION/TRAINING PROGRAM

## 2024-06-03 PROCEDURE — 87186 SC STD MICRODIL/AGAR DIL: CPT | Performed by: STUDENT IN AN ORGANIZED HEALTH CARE EDUCATION/TRAINING PROGRAM

## 2024-06-03 PROCEDURE — 86850 RBC ANTIBODY SCREEN: CPT | Performed by: OBSTETRICS & GYNECOLOGY

## 2024-06-03 PROCEDURE — 25810000003 SODIUM CHLORIDE 0.9 % SOLUTION: Performed by: OBSTETRICS & GYNECOLOGY

## 2024-06-03 PROCEDURE — 83605 ASSAY OF LACTIC ACID: CPT | Performed by: OBSTETRICS & GYNECOLOGY

## 2024-06-03 PROCEDURE — 86901 BLOOD TYPING SEROLOGIC RH(D): CPT | Performed by: OBSTETRICS & GYNECOLOGY

## 2024-06-03 PROCEDURE — 59025 FETAL NON-STRESS TEST: CPT

## 2024-06-03 PROCEDURE — 25810000003 LACTATED RINGERS SOLUTION: Performed by: OBSTETRICS & GYNECOLOGY

## 2024-06-03 PROCEDURE — 87077 CULTURE AEROBIC IDENTIFY: CPT | Performed by: STUDENT IN AN ORGANIZED HEALTH CARE EDUCATION/TRAINING PROGRAM

## 2024-06-03 PROCEDURE — 80053 COMPREHEN METABOLIC PANEL: CPT | Performed by: OBSTETRICS & GYNECOLOGY

## 2024-06-03 PROCEDURE — 36415 COLL VENOUS BLD VENIPUNCTURE: CPT | Performed by: OBSTETRICS & GYNECOLOGY

## 2024-06-03 PROCEDURE — 81001 URINALYSIS AUTO W/SCOPE: CPT | Performed by: OBSTETRICS & GYNECOLOGY

## 2024-06-03 PROCEDURE — 86900 BLOOD TYPING SEROLOGIC ABO: CPT | Performed by: OBSTETRICS & GYNECOLOGY

## 2024-06-03 RX ORDER — SODIUM CHLORIDE 9 MG/ML
75 INJECTION, SOLUTION INTRAVENOUS CONTINUOUS
Status: DISCONTINUED | OUTPATIENT
Start: 2024-06-03 | End: 2024-06-06 | Stop reason: HOSPADM

## 2024-06-03 RX ORDER — ACETAMINOPHEN 325 MG/1
650 TABLET ORAL EVERY 6 HOURS PRN
Status: DISCONTINUED | OUTPATIENT
Start: 2024-06-03 | End: 2024-06-04

## 2024-06-03 RX ADMIN — SODIUM CHLORIDE 2000 MG: 900 INJECTION INTRAVENOUS at 22:48

## 2024-06-03 RX ADMIN — ACETAMINOPHEN 650 MG: 325 TABLET ORAL at 20:49

## 2024-06-03 RX ADMIN — SODIUM CHLORIDE, POTASSIUM CHLORIDE, SODIUM LACTATE AND CALCIUM CHLORIDE 1000 ML: 600; 310; 30; 20 INJECTION, SOLUTION INTRAVENOUS at 20:45

## 2024-06-03 RX ADMIN — SODIUM CHLORIDE 125 ML/HR: 9 INJECTION, SOLUTION INTRAVENOUS at 21:24

## 2024-06-04 ENCOUNTER — APPOINTMENT (OUTPATIENT)
Dept: GENERAL RADIOLOGY | Facility: HOSPITAL | Age: 39
End: 2024-06-04
Payer: COMMERCIAL

## 2024-06-04 ENCOUNTER — APPOINTMENT (OUTPATIENT)
Dept: ULTRASOUND IMAGING | Facility: HOSPITAL | Age: 39
End: 2024-06-04
Payer: COMMERCIAL

## 2024-06-04 PROBLEM — O23.00 PYELONEPHRITIS DURING PREGNANCY, ANTEPARTUM: Status: ACTIVE | Noted: 2024-06-04

## 2024-06-04 LAB
ALBUMIN SERPL-MCNC: 3.1 G/DL (ref 3.5–5.2)
ALBUMIN SERPL-MCNC: 3.3 G/DL (ref 3.5–5.2)
ALBUMIN/GLOB SERPL: 1.1 G/DL
ALBUMIN/GLOB SERPL: 1.1 G/DL
ALP SERPL-CCNC: 128 U/L (ref 39–117)
ALP SERPL-CCNC: 136 U/L (ref 39–117)
ALT SERPL W P-5'-P-CCNC: 31 U/L (ref 1–33)
ALT SERPL W P-5'-P-CCNC: 36 U/L (ref 1–33)
ANION GAP SERPL CALCULATED.3IONS-SCNC: 13 MMOL/L (ref 5–15)
ANION GAP SERPL CALCULATED.3IONS-SCNC: 16 MMOL/L (ref 5–15)
AST SERPL-CCNC: 20 U/L (ref 1–32)
AST SERPL-CCNC: 24 U/L (ref 1–32)
B PARAPERT DNA SPEC QL NAA+PROBE: NOT DETECTED
B PERT DNA SPEC QL NAA+PROBE: NOT DETECTED
BASOPHILS # BLD AUTO: 0.01 10*3/MM3 (ref 0–0.2)
BASOPHILS NFR BLD AUTO: 0.1 % (ref 0–1.5)
BILIRUB SERPL-MCNC: 0.4 MG/DL (ref 0–1.2)
BILIRUB SERPL-MCNC: 0.7 MG/DL (ref 0–1.2)
BUN SERPL-MCNC: 5 MG/DL (ref 6–20)
BUN SERPL-MCNC: 7 MG/DL (ref 6–20)
BUN/CREAT SERPL: 10.2 (ref 7–25)
BUN/CREAT SERPL: 12.1 (ref 7–25)
C PNEUM DNA NPH QL NAA+NON-PROBE: NOT DETECTED
CALCIUM SPEC-SCNC: 8 MG/DL (ref 8.6–10.5)
CALCIUM SPEC-SCNC: 8 MG/DL (ref 8.6–10.5)
CHLORIDE SERPL-SCNC: 101 MMOL/L (ref 98–107)
CHLORIDE SERPL-SCNC: 97 MMOL/L (ref 98–107)
CO2 SERPL-SCNC: 18 MMOL/L (ref 22–29)
CO2 SERPL-SCNC: 19 MMOL/L (ref 22–29)
CREAT SERPL-MCNC: 0.49 MG/DL (ref 0.57–1)
CREAT SERPL-MCNC: 0.58 MG/DL (ref 0.57–1)
D-LACTATE SERPL-SCNC: 1.1 MMOL/L (ref 0.5–2)
DEPRECATED RDW RBC AUTO: 46.5 FL (ref 37–54)
DEPRECATED RDW RBC AUTO: 46.8 FL (ref 37–54)
EGFRCR SERPLBLD CKD-EPI 2021: 119 ML/MIN/1.73
EGFRCR SERPLBLD CKD-EPI 2021: 123.9 ML/MIN/1.73
EOSINOPHIL # BLD AUTO: 0 10*3/MM3 (ref 0–0.4)
EOSINOPHIL NFR BLD AUTO: 0 % (ref 0.3–6.2)
ERYTHROCYTE [DISTWIDTH] IN BLOOD BY AUTOMATED COUNT: 13.3 % (ref 12.3–15.4)
ERYTHROCYTE [DISTWIDTH] IN BLOOD BY AUTOMATED COUNT: 13.4 % (ref 12.3–15.4)
FLUAV SUBTYP SPEC NAA+PROBE: NOT DETECTED
FLUBV RNA ISLT QL NAA+PROBE: NOT DETECTED
GLOBULIN UR ELPH-MCNC: 2.8 GM/DL
GLOBULIN UR ELPH-MCNC: 3.1 GM/DL
GLUCOSE SERPL-MCNC: 103 MG/DL (ref 65–99)
GLUCOSE SERPL-MCNC: 126 MG/DL (ref 65–99)
HADV DNA SPEC NAA+PROBE: NOT DETECTED
HCOV 229E RNA SPEC QL NAA+PROBE: NOT DETECTED
HCOV HKU1 RNA SPEC QL NAA+PROBE: NOT DETECTED
HCOV NL63 RNA SPEC QL NAA+PROBE: NOT DETECTED
HCOV OC43 RNA SPEC QL NAA+PROBE: NOT DETECTED
HCT VFR BLD AUTO: 25.4 % (ref 34–46.6)
HCT VFR BLD AUTO: 31.8 % (ref 34–46.6)
HGB BLD-MCNC: 10.8 G/DL (ref 12–15.9)
HGB BLD-MCNC: 8.7 G/DL (ref 12–15.9)
HMPV RNA NPH QL NAA+NON-PROBE: NOT DETECTED
HPIV1 RNA ISLT QL NAA+PROBE: NOT DETECTED
HPIV2 RNA SPEC QL NAA+PROBE: NOT DETECTED
HPIV3 RNA NPH QL NAA+PROBE: NOT DETECTED
HPIV4 P GENE NPH QL NAA+PROBE: NOT DETECTED
IMM GRANULOCYTES # BLD AUTO: 0.14 10*3/MM3 (ref 0–0.05)
IMM GRANULOCYTES NFR BLD AUTO: 1 % (ref 0–0.5)
LDH SERPL-CCNC: 177 U/L (ref 135–214)
LYMPHOCYTES # BLD AUTO: 1.53 10*3/MM3 (ref 0.7–3.1)
LYMPHOCYTES NFR BLD AUTO: 11.1 % (ref 19.6–45.3)
M PNEUMO IGG SER IA-ACNC: NOT DETECTED
MCH RBC QN AUTO: 32.5 PG (ref 26.6–33)
MCH RBC QN AUTO: 32.7 PG (ref 26.6–33)
MCHC RBC AUTO-ENTMCNC: 34 G/DL (ref 31.5–35.7)
MCHC RBC AUTO-ENTMCNC: 34.3 G/DL (ref 31.5–35.7)
MCV RBC AUTO: 95.5 FL (ref 79–97)
MCV RBC AUTO: 95.8 FL (ref 79–97)
MONOCYTES # BLD AUTO: 0.65 10*3/MM3 (ref 0.1–0.9)
MONOCYTES NFR BLD AUTO: 4.7 % (ref 5–12)
NEUTROPHILS NFR BLD AUTO: 11.4 10*3/MM3 (ref 1.7–7)
NEUTROPHILS NFR BLD AUTO: 83.1 % (ref 42.7–76)
NRBC BLD AUTO-RTO: 0 /100 WBC (ref 0–0.2)
NT-PROBNP SERPL-MCNC: 353.7 PG/ML (ref 0–450)
PLATELET # BLD AUTO: 178 10*3/MM3 (ref 140–450)
PLATELET # BLD AUTO: 190 10*3/MM3 (ref 140–450)
PMV BLD AUTO: 11 FL (ref 6–12)
PMV BLD AUTO: 11.3 FL (ref 6–12)
POTASSIUM SERPL-SCNC: 3.6 MMOL/L (ref 3.5–5.2)
POTASSIUM SERPL-SCNC: 3.8 MMOL/L (ref 3.5–5.2)
PROCALCITONIN SERPL-MCNC: 0.39 NG/ML (ref 0–0.25)
PROT SERPL-MCNC: 5.9 G/DL (ref 6–8.5)
PROT SERPL-MCNC: 6.4 G/DL (ref 6–8.5)
RBC # BLD AUTO: 2.66 10*6/MM3 (ref 3.77–5.28)
RBC # BLD AUTO: 3.32 10*6/MM3 (ref 3.77–5.28)
RHINOVIRUS RNA SPEC NAA+PROBE: NOT DETECTED
RSV RNA NPH QL NAA+NON-PROBE: NOT DETECTED
SARS-COV-2 RNA NPH QL NAA+NON-PROBE: NOT DETECTED
SODIUM SERPL-SCNC: 132 MMOL/L (ref 136–145)
SODIUM SERPL-SCNC: 132 MMOL/L (ref 136–145)
TROPONIN T SERPL HS-MCNC: 12 NG/L
URATE SERPL-MCNC: 5.8 MG/DL (ref 2.4–5.7)
WBC NRBC COR # BLD AUTO: 12.57 10*3/MM3 (ref 3.4–10.8)
WBC NRBC COR # BLD AUTO: 13.73 10*3/MM3 (ref 3.4–10.8)

## 2024-06-04 PROCEDURE — 85027 COMPLETE CBC AUTOMATED: CPT | Performed by: STUDENT IN AN ORGANIZED HEALTH CARE EDUCATION/TRAINING PROGRAM

## 2024-06-04 PROCEDURE — 84550 ASSAY OF BLOOD/URIC ACID: CPT | Performed by: STUDENT IN AN ORGANIZED HEALTH CARE EDUCATION/TRAINING PROGRAM

## 2024-06-04 PROCEDURE — 87040 BLOOD CULTURE FOR BACTERIA: CPT | Performed by: STUDENT IN AN ORGANIZED HEALTH CARE EDUCATION/TRAINING PROGRAM

## 2024-06-04 PROCEDURE — 25010000002 CEFTRIAXONE PER 250 MG: Performed by: OBSTETRICS & GYNECOLOGY

## 2024-06-04 PROCEDURE — 59025 FETAL NON-STRESS TEST: CPT

## 2024-06-04 PROCEDURE — 83880 ASSAY OF NATRIURETIC PEPTIDE: CPT | Performed by: OBSTETRICS & GYNECOLOGY

## 2024-06-04 PROCEDURE — 0202U NFCT DS 22 TRGT SARS-COV-2: CPT | Performed by: STUDENT IN AN ORGANIZED HEALTH CARE EDUCATION/TRAINING PROGRAM

## 2024-06-04 PROCEDURE — 71045 X-RAY EXAM CHEST 1 VIEW: CPT

## 2024-06-04 PROCEDURE — 85025 COMPLETE CBC W/AUTO DIFF WBC: CPT | Performed by: STUDENT IN AN ORGANIZED HEALTH CARE EDUCATION/TRAINING PROGRAM

## 2024-06-04 PROCEDURE — 83615 LACTATE (LD) (LDH) ENZYME: CPT | Performed by: STUDENT IN AN ORGANIZED HEALTH CARE EDUCATION/TRAINING PROGRAM

## 2024-06-04 PROCEDURE — 25810000003 SODIUM CHLORIDE 0.9 % SOLUTION: Performed by: STUDENT IN AN ORGANIZED HEALTH CARE EDUCATION/TRAINING PROGRAM

## 2024-06-04 PROCEDURE — 93005 ELECTROCARDIOGRAM TRACING: CPT | Performed by: OBSTETRICS & GYNECOLOGY

## 2024-06-04 PROCEDURE — 63710000001 DIPHENHYDRAMINE PER 50 MG: Performed by: STUDENT IN AN ORGANIZED HEALTH CARE EDUCATION/TRAINING PROGRAM

## 2024-06-04 PROCEDURE — 76775 US EXAM ABDO BACK WALL LIM: CPT

## 2024-06-04 PROCEDURE — 84145 PROCALCITONIN (PCT): CPT | Performed by: STUDENT IN AN ORGANIZED HEALTH CARE EDUCATION/TRAINING PROGRAM

## 2024-06-04 PROCEDURE — 25810000003 SODIUM CHLORIDE 0.9 % SOLUTION: Performed by: OBSTETRICS & GYNECOLOGY

## 2024-06-04 PROCEDURE — 80053 COMPREHEN METABOLIC PANEL: CPT | Performed by: STUDENT IN AN ORGANIZED HEALTH CARE EDUCATION/TRAINING PROGRAM

## 2024-06-04 PROCEDURE — 83605 ASSAY OF LACTIC ACID: CPT | Performed by: STUDENT IN AN ORGANIZED HEALTH CARE EDUCATION/TRAINING PROGRAM

## 2024-06-04 PROCEDURE — 93010 ELECTROCARDIOGRAM REPORT: CPT | Performed by: INTERNAL MEDICINE

## 2024-06-04 PROCEDURE — 84484 ASSAY OF TROPONIN QUANT: CPT | Performed by: OBSTETRICS & GYNECOLOGY

## 2024-06-04 RX ORDER — OXYCODONE HYDROCHLORIDE 5 MG/1
10 TABLET ORAL EVERY 4 HOURS PRN
Status: DISCONTINUED | OUTPATIENT
Start: 2024-06-04 | End: 2024-06-06 | Stop reason: HOSPADM

## 2024-06-04 RX ORDER — DOCUSATE SODIUM 100 MG/1
100 CAPSULE, LIQUID FILLED ORAL 2 TIMES DAILY
Status: DISCONTINUED | OUTPATIENT
Start: 2024-06-04 | End: 2024-06-06 | Stop reason: HOSPADM

## 2024-06-04 RX ORDER — OXYCODONE HYDROCHLORIDE 5 MG/1
5 TABLET ORAL EVERY 4 HOURS PRN
Status: DISCONTINUED | OUTPATIENT
Start: 2024-06-04 | End: 2024-06-06 | Stop reason: HOSPADM

## 2024-06-04 RX ORDER — ACETAMINOPHEN 325 MG/1
650 TABLET ORAL EVERY 4 HOURS
Status: DISCONTINUED | OUTPATIENT
Start: 2024-06-04 | End: 2024-06-04

## 2024-06-04 RX ORDER — ACETAMINOPHEN 500 MG
1000 TABLET ORAL EVERY 6 HOURS PRN
Status: DISCONTINUED | OUTPATIENT
Start: 2024-06-04 | End: 2024-06-04

## 2024-06-04 RX ORDER — DIPHENHYDRAMINE HCL 25 MG
25 CAPSULE ORAL ONCE
Status: COMPLETED | OUTPATIENT
Start: 2024-06-04 | End: 2024-06-04

## 2024-06-04 RX ORDER — ACETAMINOPHEN 500 MG
1000 TABLET ORAL EVERY 6 HOURS
Status: DISCONTINUED | OUTPATIENT
Start: 2024-06-04 | End: 2024-06-06

## 2024-06-04 RX ADMIN — ACETAMINOPHEN 650 MG: 325 TABLET ORAL at 04:31

## 2024-06-04 RX ADMIN — OXYCODONE 10 MG: 5 TABLET ORAL at 12:49

## 2024-06-04 RX ADMIN — OXYCODONE 10 MG: 5 TABLET ORAL at 21:17

## 2024-06-04 RX ADMIN — OXYCODONE 5 MG: 5 TABLET ORAL at 00:34

## 2024-06-04 RX ADMIN — SODIUM CHLORIDE 150 ML/HR: 9 INJECTION, SOLUTION INTRAVENOUS at 10:01

## 2024-06-04 RX ADMIN — DOCUSATE SODIUM 100 MG: 100 CAPSULE, LIQUID FILLED ORAL at 08:27

## 2024-06-04 RX ADMIN — ACETAMINOPHEN 650 MG: 325 TABLET ORAL at 15:56

## 2024-06-04 RX ADMIN — OXYCODONE 10 MG: 5 TABLET ORAL at 17:05

## 2024-06-04 RX ADMIN — SODIUM CHLORIDE 2000 MG: 900 INJECTION INTRAVENOUS at 21:13

## 2024-06-04 RX ADMIN — ACETAMINOPHEN 1000 MG: 500 TABLET ORAL at 20:54

## 2024-06-04 RX ADMIN — SODIUM CHLORIDE 150 ML/HR: 9 INJECTION, SOLUTION INTRAVENOUS at 03:41

## 2024-06-04 RX ADMIN — DOCUSATE SODIUM 100 MG: 100 CAPSULE, LIQUID FILLED ORAL at 20:55

## 2024-06-04 RX ADMIN — DIPHENHYDRAMINE HYDROCHLORIDE 25 MG: 25 CAPSULE ORAL at 23:09

## 2024-06-04 RX ADMIN — SODIUM CHLORIDE 1000 ML: 9 INJECTION, SOLUTION INTRAVENOUS at 16:57

## 2024-06-04 RX ADMIN — OXYCODONE 10 MG: 5 TABLET ORAL at 08:27

## 2024-06-04 NOTE — PAYOR COMM NOTE
"Kvng Luna (39 y.o. Female)     [facility] ID#[id number]    Medical Inpatient Admission.    From: Dulce Marroquin LPN, Utilization Review  Phone #[phone]  Fax #[phone]        Date of Birth   1985    Social Security Number       Address   [address] [place], [place] [address]    Home Phone   [phone]    MRN   [id number]       Worship   None    Marital Status                               Admission Date   6/3/24    Admission Type   Elective    Admitting Provider   Kvng Osorio MD    Attending Provider   Kvng Osorio MD    Department, Room/Bed   [facility] ANTEPARTUM, N330/       Discharge Date       Discharge Disposition       Discharge Destination                                 Attending Provider: Kvng Osorio MD    Allergies: No Known Allergies    Isolation: None   Infection: None   Code Status: Prior    Ht: 160 cm (63\")   Wt: 87.1 kg (192 lb)    Admission Cmt: None   Principal Problem: Pyelonephritis during pregnancy, antepartum [O23.00]                   Active Insurance as of 6/3/2024       Primary Coverage       Payor Plan Insurance Group Employer/Plan Group    [employer] BLUE CROSS [employer] BLUE CROSS BLUE SHIELD PPO [id number]       Payor Plan Address Payor Plan Phone Number Payor Plan Fax Number Effective Dates    [address] [phone]  2021 - None Entered    [facility] [id number]         Subscriber Name Subscriber Birth Date Member ID       KVNG LUNA 1985 [id number]               Secondary Coverage       Payor Plan Insurance Group Employer/Plan Group    [place]        Payor Plan Address Payor Plan Phone Number Payor Plan Fax Number Effective Dates    [address]   10/1/2019 - None Entered    [id number] [id number]         Subscriber Name Subscriber Birth Date Member ID       KVNG LUNA 1985 [id number]                     Emergency Contacts        (Rel.) " Home Phone Work Phone Mobile Phone    Noble Garnica (Spouse) [phone] -- [phone]              Insurance Information                  [facility] BLUE CROSS/[facility] BLUE CROSS BLUE SHIELD PPO Phone: [phone]    Subscriber: Karen Garnica Subscriber#: [id number]    Group#: [id number] Precert#: [id number]        [place] Phone: --    Subscriber: Karen Garnica Subscriber#: [id number]    Group#: -- Precert#: --             History & Physical        Melvi Shepherd MD at 24          [facility]  Obstetric History and Physical    Chief Complaint   Patient presents with    right flank pain       Subjective    Patient is a 39 y.o. female  currently at 32w6d, who presents with flank pain and fever/chills that started last night.    Her prenatal care is complicated by  multiple gestation  DC/DA twins.  Her previous obstetric/gynecological history is noted for is non-contributory.    The following portions of the patients history were reviewed and updated as appropriate: current medications, allergies, past medical history, past surgical history, past family history, past social history, and problem list .       Prenatal Information:  Prenatal Results       Initial Prenatal Labs       Test Value Reference Range Date Time    Hemoglobin  12.2 g/dL 12.0 - 15.9 24       12.3 g/dL 12.0 - 15.9 23    Hematocrit  34.9 % 34.0 - 46.6 24       35.5 % 34.0 - 46.6 23    Platelets  223 10*3/mm3 140 - 450 24       258 10*3/mm3 140 - 450 23 173    Rubella IgG  5.82 index Immune >0.99 23 173    Hepatitis B SAg  Non-Reactive  Non-Reactive 23 173    Hepatitis C Ab  Non-Reactive  Non-Reactive 23 173    RPR        T. Pallidum Ab   Non-Reactive  Non-Reactive 24 0004    ABO  O   24 0004    Rh  Positive   24 0004    Antibody Screen  Negative   23 173    HIV  Non-Reactive   Non-Reactive 12/08/23 1739    Urine Culture  No growth   02/13/24 1450       >100,000 CFU/mL Escherichia coli   12/08/23 1739    Gonorrhea  Negative  Negative 12/08/23 1739    Chlamydia  Negative  Negative 12/08/23 1739    TSH  1.859 UIU/mL 0.350 - 5.350 12/01/15 1034    HgB A1c         Varicella IgG        HgB Electrophoresis         Cystic fibrosis                   Fetal testing        Test Value Reference Range Date Time    NIPT        MSAFP        AFP-4                  2nd and 3rd Trimester       Test Value Reference Range Date Time    Hemoglobin (repeated)  10.1 g/dL 12.0 - 15.9 05/30/24 0956       10.1 g/dL 12.0 - 15.9 05/30/24 0004       11.5 g/dL 12.0 - 15.9 05/18/24 1941    Hematocrit (repeated)  29.1 % 34.0 - 46.6 05/30/24 0956       29.7 % 34.0 - 46.6 05/30/24 0004       32.7 % 34.0 - 46.6 05/18/24 1941    Platelets   214 10*3/mm3 140 - 450 05/30/24 0956       221 10*3/mm3 140 - 450 05/30/24 0004       182 10*3/mm3 140 - 450 05/18/24 1941       223 10*3/mm3 140 - 450 01/30/24 1930       258 10*3/mm3 140 - 450 12/08/23 1739    GCT        Antibody Screen (repeated)  Negative   05/30/24 0004    3rd TM syphilis scrn (repeated)  RPR         3rd TM syphilis scrn (repeated) FTA        GTT Fasting        GTT 1 Hr        GTT 2 Hr        GTT 3 Hr        Group B Strep                  Other testing        Test Value Reference Range Date Time    Parvo IgG         CMV IgG                   Drug Screening       Test Value Reference Range Date Time    Amphetamine Screen  Negative  Negative 12/08/23 1739    Barbiturate Screen  Negative  Negative 12/08/23 1739    Benzodiazepine Screen  Negative  Negative 12/08/23 1739    Methadone Screen  Negative  Negative 12/08/23 1739    Phencyclidine Screen  Negative  Negative 12/08/23 1739    Opiates Screen  Negative  Negative 12/08/23 1739    THC Screen  Negative  Negative 12/08/23 1739    Cocaine Screen  Negative  Negative 12/08/23 1739    Propoxyphene Screen        Buprenorphine  Screen  Negative  Negative 12/08/23 1739    Methamphetamine Screen  Negative  Negative 12/08/23 1739    Oxycodone Screen  Negative  Negative 12/08/23 1739    Tricyclic Antidepressants Screen  Negative  Negative 12/08/23 1739              Legend    ^: Historical                          External Prenatal Results       Pregnancy Outside Results - Transcribed From Office Records - See Scanned Records For Details       Test Value Date Time    ABO  O  05/30/24 0004    Rh  Positive  05/30/24 0004    Antibody Screen  Negative  05/30/24 0004       Negative  12/08/23 1739    Varicella IgG       Rubella  5.82 index 12/08/23 1739    Hgb  10.1 g/dL 05/30/24 0956       10.1 g/dL 05/30/24 0004       11.5 g/dL 05/18/24 1941       12.2 g/dL 01/30/24 1930       12.3 g/dL 12/08/23 1739    Hct  29.1 % 05/30/24 0956       29.7 % 05/30/24 0004       32.7 % 05/18/24 1941       34.9 % 01/30/24 1930       35.5 % 12/08/23 1739    Glucose Fasting GTT       Glucose Tolerance Test 1 hour       Glucose Tolerance Test 3 hour       Gonorrhea (discrete)  Negative  12/08/23 1739    Chlamydia (discrete)  Negative  12/08/23 1739    RPR       VDRL       Syphilis Antibody       HBsAg  Non-Reactive  12/08/23 1739    Herpes Simplex Virus PCR       Herpes Simplex VIrus Culture       HIV  Non-Reactive  12/08/23 1739    Hep C RNA Quant PCR       Hep C Antibody  Non-Reactive  12/08/23 1739    AFP       Group B Strep       GBS Susceptibility to Clindamycin       GBS Susceptibility to Erythromycin       Fetal Fibronectin       Genetic Testing, Maternal Blood                 Drug Screening       Test Value Date Time    NIPT        Urine Drug Screen       Amphetamine Screen  Negative  12/08/23 1739    Barbiturate Screen  Negative  12/08/23 1739    Benzodiazepine Screen  Negative  12/08/23 1739    Methadone Screen  Negative  12/08/23 1739    Phencyclidine Screen  Negative  12/08/23 1739    Opiates Screen  Negative  12/08/23 1739    THC Screen  Negative  12/08/23  1739    Cocaine Screen       Propoxyphene Screen       Buprenorphine Screen  Negative  23 1739    Methamphetamine Screen       Oxycodone Screen  Negative  23 1739              Legend    ^: Historical                             Past OB History:     OB History    Para Term  AB Living   7 4 4 0 2 4   SAB IAB Ectopic Molar Multiple Live Births   2 0 0 0 0 4      # Outcome Date GA Lbr Noah/2nd Weight Sex Type Anes PTL Lv   7 Current            6 Term 23 38w4d 15:53 / 00:17 3790 g (8 lb 5.7 oz) M Vag-Spont EPI N JONN      Name: Chapito      Apgar1: 8  Apgar5: 9   5 Term 16 37w4d  3260 g (7 lb 3 oz) M Vag-Spont EPI  JONN      Complications: Pregnancy-induced hypertension      Name: Chente   4 Term 04/04/15 38w2d  3459 g (7 lb 10 oz) F Vag-Spont EPI  JONN      Complications: Pregnancy-induced hypertension, Preeclampsia in postpartum period, Pulmonary edema      Name: Carrington   3 2014 6w0d          2 2013           1 Term 07/08/10 39w0d  2977 g (6 lb 9 oz) M Vag-Spont EPI  JONN      Name: Mayelin      Obstetric Comments   2010 - Mayelin (FOB #1)    - Carrington (FOB #2)    - Chente (FOB #3)    - Chapito (FOB #3)   Current  (FOB #3)       Past Medical History: Past Medical History:   Diagnosis Date    History of gestational diabetes - with 4th pregnancy     Hx of postpatum cardiomyopathy     LGSIL on Pap smear of cervix 2023    Tx wtih LEEP    Postpartum anxiety     Preeclampsia in postpartum period 2015    Along with pulmonary edema      Past Surgical History Past Surgical History:   Procedure Laterality Date    LEEP  2023    Path = FELICIA 2 with clear margins      Family History: Family History   Problem Relation Age of Onset    Hypertension Mother     Diabetes type II Father     Hypertension Father     Other Son         bifid uvula and polydactyly    Breast cancer Neg Hx     Colon cancer Neg Hx     Ovarian cancer Neg Hx       Social History:  reports that  she has never smoked. She has never been exposed to tobacco smoke. She has never used smokeless tobacco.   reports that she does not currently use alcohol after a past usage of about 2.0 standard drinks of alcohol per week.   reports no history of drug use.        Review of Systems      Objective    Vital Signs Range for the last 24 hours  Temperature: Temp:  [100.1 °F (37.8 °C)] 100.1 °F (37.8 °C)   Temp Source: Temp src: Oral   BP: BP: (140)/(75) 140/75   Pulse:     Respirations: Resp:  [18] 18   SPO2:     O2 Amount (l/min):     O2 Devices Device (Oxygen Therapy): room air   Weight:       Physical Examination: General appearance - anxious, ill-appearing, and crying  Chest - unlabored respirations  Heart - no JVD  Abdomen - soft, nontender, nondistended, no masses or organomegaly, gravid  Back exam - full range of motion, no tenderness, palpable spasm or pain on motion, right sided CVA tenderness  Neurological - alert, oriented, normal speech, no focal findings or movement disorder noted  Musculoskeletal - no joint tenderness, deformity or swelling  Extremities - peripheral pulses normal, no pedal edema, no clubbing or cyanosis  Skin - normal coloration and turgor, no rashes, no suspicious skin lesions noted    Presentation: Not assessed   Cervix: Exam by:     Dilation:     Effacement:     Station:       Fetal Heart Rate Assessment   Method:  external   Beats/min:   180/160   Baseline:     Variability:  moderate   Accels:  present   Decels:  absent   Tracing Category:  Reactive but tachycardic     Uterine Assessment   Method:  irregular   Frequency (min):     Ctx Count in 10 min:     Duration:     Intensity:     Intensity by IUPC:     Resting Tone:     Resting Tone by IUPC:     [place] Units:       Laboratory Results: pending  Radiology Review: none  Other Studies: none    Assessment & Plan      Flank pain    Fever      Assessment & Plan    Assessment:  1.  Intrauterine pregnancy at 32w6d gestation with reactive  fetal status.    2.  I am concerned for pyelonephritis. CBC, lactic acid, CMP      Plan:  I transitioned care to Dr. Malin at 2000. He will determine ultimate disposition and plan.      Melvi Shepherd MD  6/3/2024  20:07 EDT      Electronically signed by Melvi Shepherd MD at 06/03/24 2013       Vital Signs (last day)       Date/Time Temp Temp src Pulse Resp BP Patient Position SpO2    06/04/24 1146 98.5 (36.9) Oral 108 16 121/68 Lying --    06/04/24 0825 -- -- 114 -- -- -- 98    06/04/24 0736 99.9 (37.7) Oral 95 18 126/83 Sitting --    06/04/24 0408 99.5 (37.5) Oral 117 18 117/73 Lying --    06/04/24 0018 98.6 (37) Oral 103 18 132/75 Sitting --    06/03/24 2222 98.4 (36.9) Oral 111 18 111/59 Lying --    06/03/24 2015 -- -- 123 -- -- -- 97    06/03/24 1950 -- -- 125 -- -- -- 98    06/03/24 1947 100.1 (37.8) Oral -- 18 140/75 Lying --          Facility-Administered Medications as of 6/4/2024   Medication Dose Route Frequency Provider Last Rate Last Admin    acetaminophen (TYLENOL) tablet 650 mg  650 mg Oral Q6H PRN Melvi Shepherd MD   650 mg at 06/04/24 0431    [COMPLETED] betamethasone acetate-betamethasone sodium phosphate (CELESTONE SOLUSPAN) injection 12 mg  12 mg Intramuscular Q24H Zak Espitia MD   12 mg at 05/31/24 0214    cefTRIAXone (ROCEPHIN) 2,000 mg in sodium chloride 0.9 % 100 mL MBP  2,000 mg Intravenous Q24H Nestor Malin  mL/hr at 06/03/24 2248 2,000 mg at 06/03/24 2248    docusate sodium (COLACE) capsule 100 mg  100 mg Oral BID Nestor Malin MD   100 mg at 06/04/24 0827    [COMPLETED] lactated ringers bolus 1,000 mL  1,000 mL Intravenous Once Melvi Shepherd MD   Stopped at 06/03/24 2249    oxyCODONE (ROXICODONE) immediate release tablet 10 mg  10 mg Oral Q4H PRN HighNestor MD   10 mg at 06/04/24 1249    oxyCODONE (ROXICODONE) immediate release tablet 5 mg  5 mg Oral Q4H PRN HighNestor MD   5 mg at 06/04/24 0034    sodium chloride 0.9 % infusion  150 mL/hr Intravenous  Continuous High, Nestor MACEDO  mL/hr at 06/04/24 1001 150 mL/hr at 06/04/24 1001     Lab Results (last 24 hours)       Procedure Component Value Units Date/Time    Comprehensive Metabolic Panel [[id number]]  (Abnormal) Collected: 06/04/24 1302    Specimen: Blood Updated: 06/04/24 1340     Glucose 126 mg/dL      BUN 5 mg/dL      Creatinine 0.49 mg/dL      Sodium 132 mmol/L      Potassium 3.6 mmol/L      Chloride 101 mmol/L      CO2 18.0 mmol/L      Calcium 8.0 mg/dL      Total Protein 6.4 g/dL      Albumin 3.3 g/dL      ALT (SGPT) 36 U/L      AST (SGOT) 24 U/L      Alkaline Phosphatase 136 U/L      Total Bilirubin 0.7 mg/dL      Globulin 3.1 gm/dL      Comment: Calculated Result        A/G Ratio 1.1 g/dL      BUN/Creatinine Ratio 10.2     Anion Gap 13.0 mmol/L      eGFR 123.9 mL/min/1.73     Narrative:      GFR Normal >60  Chronic Kidney Disease <60  Kidney Failure <15      Lactate Dehydrogenase [[id number]]  (Normal) Collected: 06/04/24 1302    Specimen: Blood Updated: 06/04/24 1340      U/L     Uric Acid [[id number]]  (Abnormal) Collected: 06/04/24 1302    Specimen: Blood Updated: 06/04/24 1340     Uric Acid 5.8 mg/dL      Comment: Falsely depressed results may occur on samples drawn from patients receiving N-Acetylcysteine (NAC) or Metamizole.       CBC & Differential [[id number]]  (Abnormal) Collected: 06/04/24 1302    Specimen: Blood Updated: 06/04/24 1317    Narrative:      The following orders were created for panel order CBC & Differential.  Procedure                               Abnormality         Status                     ---------                               -----------         ------                     CBC Auto Differential[[id number]]        Abnormal            Final result                 Please view results for these tests on the individual orders.    CBC Auto Differential [[id number]]  (Abnormal) Collected: 06/04/24 1302    Specimen: Blood Updated: 06/04/24 1317     WBC 13.73 10*3/mm3       RBC 3.32 10*6/mm3      Hemoglobin 10.8 g/dL      Hematocrit 31.8 %      MCV 95.8 fL      MCH 32.5 pg      MCHC 34.0 g/dL      RDW 13.3 %      RDW-SD 46.5 fl      MPV 11.0 fL      Platelets 190 10*3/mm3      Neutrophil % 83.1 %      Lymphocyte % 11.1 %      Monocyte % 4.7 %      Eosinophil % 0.0 %      Basophil % 0.1 %      Immature Grans % 1.0 %      Neutrophils, Absolute 11.40 10*3/mm3      Lymphocytes, Absolute 1.53 10*3/mm3      Monocytes, Absolute 0.65 10*3/mm3      Eosinophils, Absolute 0.00 10*3/mm3      Basophils, Absolute 0.01 10*3/mm3      Immature Grans, Absolute 0.14 10*3/mm3      nRBC 0.0 /100 WBC     Blood Culture - Blood, Arm, Right [[id number]] Collected: 06/04/24 1013    Specimen: Blood from Arm, Right Updated: 06/04/24 1041    Urine Culture - Urine, Urine, Clean Catch [[id number]] Collected: 06/03/24 2027    Specimen: Urine, Clean Catch Updated: 06/04/24 0947    Comprehensive Metabolic Panel [[id number]]  (Abnormal) Collected: 06/03/24 2040    Specimen: Blood Updated: 06/03/24 2110     Glucose 106 mg/dL      BUN 9 mg/dL      Creatinine 0.48 mg/dL      Sodium 132 mmol/L      Potassium 3.6 mmol/L      Chloride 97 mmol/L      CO2 18.0 mmol/L      Calcium 7.9 mg/dL      Total Protein 6.4 g/dL      Albumin 3.1 g/dL      ALT (SGPT) 35 U/L      AST (SGOT) 25 U/L      Alkaline Phosphatase 119 U/L      Total Bilirubin 0.4 mg/dL      Globulin 3.3 gm/dL      Comment: Calculated Result        A/G Ratio 0.9 g/dL      BUN/Creatinine Ratio 18.8     Anion Gap 17.0 mmol/L      eGFR 124.5 mL/min/1.73     Narrative:      GFR Normal >60  Chronic Kidney Disease <60  Kidney Failure <15      Lactic Acid, Plasma [[id number]]  (Normal) Collected: 06/03/24 2040    Specimen: Blood Updated: 06/03/24 2105     Lactate 0.9 mmol/L      Comment: Falsely depressed results may occur on samples drawn from patients receiving N-Acetylcysteine (NAC) or Metamizole.       CBC & Differential [[id number]]  (Abnormal) Collected: 06/03/24 2040     Specimen: Blood Updated: 06/03/24 2058    Narrative:      The following orders were created for panel order CBC & Differential.  Procedure                               Abnormality         Status                     ---------                               -----------         ------                     CBC Auto Differential[[id number]]        Abnormal            Final result                 Please view results for these tests on the individual orders.    CBC Auto Differential [[id number]]  (Abnormal) Collected: 06/03/24 2040    Specimen: Blood Updated: 06/03/24 2058     WBC 12.62 10*3/mm3      RBC 3.13 10*6/mm3      Hemoglobin 10.3 g/dL      Hematocrit 29.2 %      MCV 93.3 fL      MCH 32.9 pg      MCHC 35.3 g/dL      RDW 13.2 %      RDW-SD 43.8 fl      MPV 11.5 fL      Platelets 188 10*3/mm3      Neutrophil % 82.1 %      Lymphocyte % 11.3 %      Monocyte % 5.5 %      Eosinophil % 0.0 %      Basophil % 0.1 %      Immature Grans % 1.0 %      Neutrophils, Absolute 10.36 10*3/mm3      Lymphocytes, Absolute 1.43 10*3/mm3      Monocytes, Absolute 0.70 10*3/mm3      Eosinophils, Absolute 0.00 10*3/mm3      Basophils, Absolute 0.01 10*3/mm3      Immature Grans, Absolute 0.12 10*3/mm3      nRBC 0.2 /100 WBC     Urinalysis, Microscopic Only - Urine, Clean Catch [[id number]]  (Abnormal) Collected: 06/03/24 2027    Specimen: Urine, Clean Catch Updated: 06/03/24 2045     RBC, UA 3-5 /HPF      WBC, UA Too Numerous to Count /HPF      Bacteria, UA 4+ /HPF      Squamous Epithelial Cells, UA 0-2 /HPF      Hyaline Casts, UA None Seen /LPF      Methodology Automated Microscopy    Urinalysis With Microscopic If Indicated (No Culture) - Urine, Clean Catch [[id number]]  (Abnormal) Collected: 06/03/24 2027    Specimen: Urine, Clean Catch Updated: 06/03/24 2043     Color, UA Dark Yellow     Appearance, UA Cloudy     pH, UA 5.5     Specific Gravity, UA 1.008     Glucose, UA Negative     Ketones, UA 15 mg/dL (1+)     Bilirubin, UA Negative      Blood, UA Small (1+)     Protein, UA Trace     Leuk Esterase, UA Moderate (2+)     Nitrite, UA Positive     Urobilinogen, UA 1.0 E.U./dL          Imaging Results (Last 24 Hours)       Procedure Component Value Units Date/Time    US Renal Bilateral [[id number]] Collected: 06/04/24 1302     Updated: 06/04/24 1319    Narrative:      US RENAL BILATERAL    Date of Exam: 6/4/2024 12:34 PM EDT    Indication: pyelonephritis. Right-sided pain    Comparison: No comparisons available.    Technique: Grayscale and color Doppler ultrasound evaluation of the kidneys and urinary bladder was performed.      Findings:  Kidneys are generally well visualized, and appear in the upper range of normal size at approximately 14 cm in length on the right and 13 cm in length on the left. No renal cyst, mass, hydronephrosis or nephrolithiasis is seen. Right renal cortex appears   normal in echotexture and thickness. Left renal cortex, however, appears thickened, to over 2 cm and subjectively, with somewhat decreased echogenicity which could reflect edema from underlying pyelonephritis, however, this appears to be contralateral to   the patient's current symptoms.. There is expected color Doppler flow bilaterally. Bladder is moderately distended and normal in appearance. No ascites is seen.      Impression:      Impression:    1. Kidneys in the upper range of normal size, larger right kidney than left, but no evidence of obstructive uropathy.    2. Prominent left renal cortical thickness as noted. No focal renal cortical lesion or other focal abnormality is demonstrated of either kidney.        Electronically Signed: Tez Huynh MD    6/4/2024 1:16 PM EDT    Workstation ID: [id number]          Orders (last 24 hrs)        Start     Ordered    06/04/24 1353  Inpatient Admission  Once         06/04/24 1355    06/04/24 1249  Uric Acid  Once         06/04/24 1248    06/04/24 1248  CBC & Differential  Once         06/04/24 1248    06/04/24 1248   Comprehensive Metabolic Panel  Once         06/04/24 1248    06/04/24 1248  Lactate Dehydrogenase  Once         06/04/24 1248    06/04/24 1248  Preeclampsia Panel  Once,   Status:  Canceled         06/04/24 1248    06/04/24 1248  CBC Auto Differential  PROCEDURE ONCE         06/04/24 1248    06/04/24 1248  Uric Acid  PROCEDURE ONCE,   Status:  Canceled         06/04/24 1248    06/04/24 1057  US Renal Bilateral  1 Time Imaging         06/04/24 1056    06/04/24 1044  DIET MESSAGE The patient was requesting several diet coke cans with her lunch tray. THANK YOU!  Once        Comments: The patient was requesting several diet coke cans with her lunch tray. THANK YOU!    06/04/24 1044    06/04/24 0948  Urine Culture - Urine, Urine, Clean Catch  Once        Comments: Please ensure 'Use Existing Specimen' is selected if this order is for urine culture add-on.  If no button appears, please contact the lab for assistance.      06/04/24 0947    06/04/24 0915  docusate sodium (COLACE) capsule 100 mg  2 Times Daily         06/04/24 0816    06/04/24 0849  Urinalysis With Culture If Indicated - Urine, Clean Catch  STAT,   Status:  Canceled        Comments: Please ensure 'Use Existing Specimen' is selected if this order is for urine culture add-on.  If no button appears, please contact the lab for assistance.      06/04/24 0848    06/04/24 0849  Blood Culture - Blood, Arm, Right  STAT         06/04/24 0849    06/04/24 0846  US Renal Bilateral  1 Time Imaging,   Status:  Canceled         06/04/24 0847    06/04/24 0814  oxyCODONE (ROXICODONE) immediate release tablet 10 mg  Every 4 Hours PRN         06/04/24 0816    06/04/24 0800  Fetal Nonstress Test  Once        Comments: TID for 1 hour    06/04/24 0324    06/04/24 0324  Vital Signs  Per Hospital Policy         06/04/24 0324    06/04/24 0323  Diet: Regular/House; Fluid Consistency: Thin (IDDSI 0)  Diet Effective Now         06/04/24 0324    06/04/24 0323  Activity As Tolerated   Until Discontinued         06/04/24 0324    06/04/24 0020  oxyCODONE (ROXICODONE) immediate release tablet 5 mg  Every 4 Hours PRN         06/04/24 0021    06/03/24 2200  cefTRIAXone (ROCEPHIN) 2,000 mg in sodium chloride 0.9 % 100 mL MBP  Every 24 Hours         06/03/24 2113 06/03/24 2200  sodium chloride 0.9 % infusion  Continuous         06/03/24 2114 06/03/24 2045  lactated ringers bolus 1,000 mL  Once         06/03/24 1950 06/03/24 2044  Urinalysis, Microscopic Only - Urine, Clean Catch  Once         06/03/24 2043 06/03/24 2007  Type & Screen  STAT         06/03/24 2006 06/03/24 2007  Lactic Acid, Plasma  STAT         06/03/24 2006 06/03/24 2006  Urinalysis With Microscopic If Indicated (No Culture) - Urine, Clean Catch  Once         06/03/24 2006 06/03/24 1951  CBC & Differential  STAT         06/03/24 1950 06/03/24 1951  Comprehensive Metabolic Panel  STAT         06/03/24 1950 06/03/24 1951  CBC Auto Differential  PROCEDURE ONCE         06/03/24 1950    06/03/24 1950  acetaminophen (TYLENOL) tablet 650 mg  Every 6 Hours PRN         06/03/24 1950                     Physician Progress Notes (last 24 hours)        Geneva Mcneal, DO at 06/04/24 1029          [facility]  Antepartum Progress Note    Chief Complaint: Pyelonephritis    Subjective     Patient reports she is doing okay this am. She admits that right flank pain is still intense. Admits to pain that is mid to low back on both side but primarily on the right. She denies fever or chills at this time. Reports she is feeling good fetal movement of both babies. She does not intermittent contraction pains. Denies leakage of fluid or vaginal bleeding. Denies hematuria or dysuria since admission. She has been tolerating PO intake. Tolerating antibiotics well.     Objective     Vital Signs Range for the last 24 hours  Temp:  [98.4 °F (36.9 °C)-100.1 °F (37.8 °C)] 98.5 °F (36.9 °C)   BP: (111-140)/(59-83) 121/68   Heart Rate:   [] 108   Resp:  [16-18] 16   SpO2:  [97 %-98 %] 98 %       Device (Oxygen Therapy): room air     Fetal Heart Rate Assessment   Method: Fetal HR Assessment Method: external   Beats/min: Fetal HR (beats/min): 145   Baseline: Fetal HR Baseline: normal range   Varibility: Fetal HR Variability: moderate (amplitude range 6 to 25 bpm)   Accels: Fetal HR Accelerations: greater than/equal to 15 bpm, lasting at least 15 seconds   Decels: Fetal HR Decelerations: absent   Tracing Category:       Uterine Assessment   Method: Method: external tocotransducer   Frequency (min): Contraction Frequency (Minutes): x2   Ctx Count in 10 min: Contractions in 10 Minutes: 1   Duration:     Intensity: Contraction Intensity: no contractions   Intensity by IUPC:     Resting Tone: Uterine Resting Tone: soft by palpation   Resting Tone by IUPC:             Physical Exam:  General: Patient is well appearing and in no acute distress   Heart CVS exam: mild LE edema bilaterally, equal.   Lungs Chest: unlabored breathing, no audible wheezes   Abdomen Gravid, soft nontender   Extremities  Back Normal in appearance, ROM intact bilaterally  Tenderness to palpation, CVA tenderness present on right side       Laboratory Results  WBC   Date Value Ref Range Status   06/03/2024 12.62 (H) 3.40 - 10.80 10*3/mm3 Final     RBC   Date Value Ref Range Status   06/03/2024 3.13 (L) 3.77 - 5.28 10*6/mm3 Final     Hemoglobin   Date Value Ref Range Status   06/03/2024 10.3 (L) 12.0 - 15.9 g/dL Final     Hematocrit   Date Value Ref Range Status   06/03/2024 29.2 (L) 34.0 - 46.6 % Final     MCV   Date Value Ref Range Status   06/03/2024 93.3 79.0 - 97.0 fL Final     MCH   Date Value Ref Range Status   06/03/2024 32.9 26.6 - 33.0 pg Final     MCHC   Date Value Ref Range Status   06/03/2024 35.3 31.5 - 35.7 g/dL Final     RDW   Date Value Ref Range Status   06/03/2024 13.2 12.3 - 15.4 % Final     RDW-SD   Date Value Ref Range Status   06/03/2024 43.8 37.0 - 54.0 fl  Final     MPV   Date Value Ref Range Status   2024 11.5 6.0 - 12.0 fL Final     Platelets   Date Value Ref Range Status   2024 188 140 - 450 10*3/mm3 Final     Neutrophil %   Date Value Ref Range Status   2024 82.1 (H) 42.7 - 76.0 % Final     Lymphocyte %   Date Value Ref Range Status   2024 11.3 (L) 19.6 - 45.3 % Final     Monocyte %   Date Value Ref Range Status   2024 5.5 5.0 - 12.0 % Final     Eosinophil %   Date Value Ref Range Status   2024 0.0 (L) 0.3 - 6.2 % Final     Basophil %   Date Value Ref Range Status   2024 0.1 0.0 - 1.5 % Final     Immature Grans %   Date Value Ref Range Status   2024 1.0 (H) 0.0 - 0.5 % Final     Neutrophils, Absolute   Date Value Ref Range Status   2024 10.36 (H) 1.70 - 7.00 10*3/mm3 Final     Lymphocytes, Absolute   Date Value Ref Range Status   2024 1.43 0.70 - 3.10 10*3/mm3 Final     Monocytes, Absolute   Date Value Ref Range Status   2024 0.70 0.10 - 0.90 10*3/mm3 Final     Eosinophils, Absolute   Date Value Ref Range Status   2024 0.00 0.00 - 0.40 10*3/mm3 Final     Basophils, Absolute   Date Value Ref Range Status   2024 0.01 0.00 - 0.20 10*3/mm3 Final     Immature Grans, Absolute   Date Value Ref Range Status   2024 0.12 (H) 0.00 - 0.05 10*3/mm3 Final     nRBC   Date Value Ref Range Status   2024 0.2 0.0 - 0.2 /100 WBC Final     Lab Results   Component Value Date    GLUCOSE 106 (H) 2024    BUN 9 2024    CREATININE 0.48 (L) 2024    EGFRRESULT 124.8 05/10/2023    EGFR 124.5 2024    BCR 18.8 2024    K 3.6 2024    CO2 18.0 (L) 2024    CALCIUM 7.9 (L) 2024    ALBUMIN 3.1 (L) 2024    BILITOT 0.4 2024    AST 25 2024    ALT 35 (H) 2024       Ultrasound Results:     [facility]    Result Date: 2024  PAT NAME: KVNG LUNA [facility] REC#: [id number] BIRTH DA: 1985 PAT GEND: F ACCOUNT#: [id number]  PAT TYPE: I EXAM VINCENT: [id number] REF PHYS KVNG KHANNA ACCESSION [id number] Comparison Studies ================= The findings of this study are compared to the prior ultrasound study dated 24 Patient Status ============ Inpatient Indication ======== Di/Di twins. New GHTN/preeclampsia. AMA. Hx preeclampsia. Hx cardiomyopathy. Hx LEEP. Obesity BMI 32. Maternal Assessment ================== Height 162 cm Height (ft) 5 ft Height (in) 4 in Weight 86 kg Weight (lb) 190 lb BMI 32.84 kg/m² Method ======= Transabdominal ultrasound examination. View: Adequate view Pregnancy ========= Twin pregnancy. Dichorionic-diamniotic. Number of fetuses: 2 Dating ====== Method of dating: based on stated SUMMER GA by prior assessment 32 w + 2 d SUMMER by prior assessment: 2024 Ultrasound examination on: 2024 GA by U/S based upon: AC, BPD, Femur, HC GA by U/S 31 w + 6 d SUMMER by U/S: 2024 GA by U/S based upon (Fetus 2): AC, BPD, Femur, HC GA by U/S (Fetus 2) 32 w + 5 d SUMMER by U/S (Fetus 2): 2024 Previous dating: based on stated SUMMER, selected on 2024 Agreed SUMMER of previous datin2024 Assigned: based on stated SUMMER, selected on 2024 Assigned GA 32 w + 2 d Assigned SUMMER: 2024 Pregnancy length 280 d Fetal Biometry ============ Standard BPD 75.7 mm 30w 3d        4%        Hadlock .9 mm 35w 2d        96%        Geovanna .6 mm 32w 4d        20%        Hadlock Cerebellum tr 39.0 mm 31w 4d        15%        Hill .7 mm 31w 2d        23%        Hadlock Femur 64.4 mm 33w 2d        64%        Hadlock Humerus 54.9 mm 32w 0d        44%        Geovanna HC / AC 1.08 EFW 1,875 g         41%        Alfredo EFW discordance 5.4 % EFW (lb) 4 lb EFW (oz) 2 oz EFW by: Hadlock (BPD-HC-AC-FL) Extended Cav. septi pel. tr 5.1 mm  5.3 mm CM 5.3 mm         6%        Nicolaides Head / Face / Neck Cephalic index 0.71         <1%        Nicolaides Extremities / Bony Struc FL / BPD 0.85 FL / HC 0.22 FL / AC 0.24  Other Structures  bpm Fetal Biometry ============ Standard BPD 80.6 mm 32w 3d        44%        Hadlock .7 mm 35w 2d        95%        Geovanna .7 mm 33w 5d        52%        Hadlock Cerebellum tr 38.2 mm 31w 1d        9%        Hill .7 mm 32w 4d        59%        Hadlock Femur 61.4 mm 31w 6d        26%        Hadlock Humerus 53.7 mm 31w 2d        26%        Geovanna HC / AC 1.06 EFW 1,982 g         50%        Alfredo EFW discordance 5.4 % EFW (lb) 4 lb EFW (oz) 6 oz EFW by: Hadlock (BPD-HC-AC-FL) Extended Cav. septi pel. tr 4.9 mm  4.9 mm CM 8.6 mm         83%        Nicolaides Head / Face / Neck Cephalic index 0.76         11%        Nicolaides Extremities / Bony Struc FL / BPD 0.76 FL / HC 0.20 FL / AC 0.21 Other Structures  bpm General Evaluation ================ Cardiac activity present.  bpm. Fetal movements present. Presentation cephalic, maternal left. Placenta Placental site: posterior. Amniotic fluid Amount of AF: normal. MVP 4.7 cm. General Evaluation ================ Cardiac activity present.  bpm. Fetal movements present. Presentation cephalic, maternal right. Placenta Placental site: anterior. Amniotic fluid Amount of AF: normal. MVP 4.2 cm. Fetal Anatomy ============ Cranium: Normal Cavum septi pellucidi: Normal Cerebellum: Normal Cisterna magna: Normal Head / Neck Rt lateral ventricle: Normal Lt lateral ventricle: Normal 4-chamber view: Appears normal RVOT view: Appears normal LVOT view: Appears normal Stomach: Appears normal Kidneys: Appears normal Bladder: Appears normal Gender: female Wants to know gender: yes Fetal Anatomy ============ Cranium: Normal Cavum septi pellucidi: Normal Cerebellum: Normal Cisterna magna: Normal Head / Neck Rt lateral ventricle: Normal Lt lateral ventricle: Normal Profile: Normal 4-chamber view: Appears normal RVOT view: Appears normal LVOT view: Appears normal Stomach: Appears normal Kidneys: Appears normal Bladder: Appears  normal Gender: female Wants to know gender: yes Fetal Doppler =========== Arterial Umbilical A PI 1.19         92%        Ranjan Umbilical A RI 0.71         89%        Ranjan Umbilical A PS -53.80 cm/s Umbilical A ED 15.13 cm/s Umbilical A TAmax -33.01 cm/s Umbilical A MD 13.12 cm/s Umbilical A S / D 3.54         89%        Ranjan Umbilical A  bpm Fetal Doppler =========== Arterial Umbilical A PI 1.08         81%        Ranjan Umbilical A RI 0.67         77%        Ranjan Umbilical A PS 36.71 cm/s         6%        Ebbing Umbilical A ED 12.13 cm/s Umbilical A TAmax 22.76 cm/s         4%        Ebbing Umbilical A MD 11.86 cm/s Umbilical A S / D 3.03         70%        Ranjan Umbilical A  bpm Biophysical Profile =============== 2: Fetal breathing movements 2: Gross body movements 2: Fetal tone 2: Amniotic fluid volume 8/8 Biophysical profile score Biophysical Profile =============== 2: Fetal breathing movements 2: Gross body movements 2: Fetal tone 2: Amniotic fluid volume 8/8 Biophysical profile score Impression ========= Size consistent with dates both twins. No fetal anomalies were identified. Amniotic fluid volume is normal for both twins. Umbilical artery S/D ratio is normal for both twins. Recommendation =============== Follow-up as clinically indicated. Coding ====== Description: 76816-26 Follow Up Ultrasound Description: 76816-26-59 Follow up Ultrasound additional fetus Description: 23788-51 BPP without NST Description: 76819-26-59 BPP without NST additional fetus Description: 75733-82 Doppler Umbilical Artery Description: 76820-26-59 Doppler Umbilical Artery add'l gestation Sonographer: Breonna Early RDMS Physician: Doug Milligan, MD, FACOG Electronically signed by: Doug Milligan, MD, FACOG at: 2024/05/30 08:01      Assessment/Plan  Di/Di Pregnancy @ 33w0d   Pyelonephritis   GHTN   History postpartum cardiomyopathy    AMA     1. Current management on APU    2. Pyelonephritis   -Tmax on  intake 100.1 6/3 1947  -Currently on rocephin every Q 24h  -Afebrile since admission  -Current management of pain with tylenol and roxicodone PRN  -Will continue IV antibiotics through 48h afebrile  -Mild leukocytosis on intake-repeat labs this am  -Urine sent for culture  -Blood cultures ordered  -Discussed will need PO antibiotics continued through remainder of pregnancy  -Will order renal US bilaterally to r/o presences of stone    3. FWB-Di/Di Twin Gestation  -S/p ANCE 5/30 and 5/31   -TID monitoring  -PDC US from 5/30 as listed above  -Will order BPP for tomorrow am    4. GHTN  -BP normotensive since intake  -Asymptomatic  -Will repeat labs     5. Diet  -Regular    6. Activity  -Ad ludmila    7. DVT PPX  -SCDs    Dispo: Continue care on APU through 48h afebrile. Will follow urine and blood cultures.       Geneva Mcneal DO  6/4/2024  12:14 EDT    Electronically signed by Geneva Mcneal DO at 06/04/24 1225       Medical Student Notes (last 24 hours)  Notes from 06/03/24 1421 through 06/04/24 1421   No notes of this type exist for this encounter.       FHR (last day)        Date/Time Fetal HR Assessment Method Fetal HR (beats/min) Fetal HR Baseline Fetal HR Variability Fetal HR Accelerations Fetal HR Decelerations Fetal HR Tracing Category    06/04/24 0952 external  145  normal range  moderate (amplitude range 6 to 25 bpm)  greater than/equal to 15 bpm;lasting at least 15 seconds  absent  --     06/04/24 0945 external  145  normal range  moderate (amplitude range 6 to 25 bpm)  greater than/equal to 15 bpm;lasting at least 15 seconds  absent  --     06/04/24 0930 external  140  normal range  moderate (amplitude range 6 to 25 bpm)  greater than/equal to 15 bpm;lasting at least 15 seconds  absent  --     06/04/24 0915 external  145  normal range  moderate (amplitude range 6 to 25 bpm)  greater than/equal to 15 bpm;lasting at least 15 seconds  absent  --     06/04/24 0900 external  140  normal range  moderate  (amplitude range 6 to 25 bpm)  greater than/equal to 15 bpm;lasting at least 15 seconds  absent  --     06/04/24 0845 external  --  indeterminate  --  --  --  --     06/04/24 0830 external  --  indeterminate  --  --  --  --     06/04/24 0815 external  140  normal range  --  --  --  --     06/04/24 0800 external  140  normal range  moderate (amplitude range 6 to 25 bpm)  --  --  --     06/04/24 0745 external  145  normal range  moderate (amplitude range 6 to 25 bpm)  greater than/equal to 15 bpm;lasting at least 15 seconds  absent  --     06/03/24 2130 external  175  tachycardia  moderate (amplitude range 6 to 25 bpm)  greater than/equal to 15 bpm;lasting at least 15 seconds  absent  --     06/03/24 2100 external  175  tachycardia  moderate (amplitude range 6 to 25 bpm)  greater than/equal to 15 bpm;lasting at least 15 seconds  absent  --     06/03/24 2045 external  175  tachycardia  moderate (amplitude range 6 to 25 bpm)  greater than/equal to 15 bpm;lasting at least 15 seconds  absent  --     06/03/24 2030 external  175  tachycardia  moderate (amplitude range 6 to 25 bpm)  greater than/equal to 15 bpm;lasting at least 15 seconds  absent  --     06/03/24 2015 external  175  tachycardia  moderate (amplitude range 6 to 25 bpm)  greater than/equal to 15 bpm;lasting at least 15 seconds  absent  --     06/03/24 2000 external  180  tachycardia  moderate (amplitude range 6 to 25 bpm)  absent  absent  --     06/03/24 1940 external  US applied  --  --  --  --  --  --                   Uterine Activity (last day)        Date/Time Method Contraction Frequency (Minutes) Contraction Duration (sec) Contraction Intensity Uterine Resting Tone Contraction Pattern    06/04/24 0952 external tocotransducer  --  100  --  --  --     06/04/24 0945 external tocotransducer  --  90  --  --  --     06/04/24 0930 external tocotransducer  --  110  --  --  --     06/04/24 0915 external tocotransducer  --  90  --  --  --     06/04/24 0900  external tocotransducer  --  --  no contractions  soft by palpation  --     06/04/24 0845 none  --  --  --  --  --     06/04/24 0830 external tocotransducer  --  80  --  --  --     06/04/24 0815 external tocotransducer  --  60-80  --  --  --     06/04/24 0800 external tocotransducer  --  100  --  --  --     06/04/24 0745 external tocotransducer  --  110  moderate by palpation  soft by palpation  --     06/03/24 2222 palpation  --  --  --  soft by palpation  --     06/03/24 2130 external tocotransducer  --  --  no contractions  --  --     06/03/24 2100 external tocotransducer  --  --  no contractions  --  --     06/03/24 2045 external tocotransducer  x2  50-80  --  --  --     06/03/24 2030 external tocotransducer  x1  80  --  --  --     06/03/24 2015 external tocotransducer  x2    --  --  --     06/03/24 2000 external tocotransducer  x1  100  --  --  --     06/03/24 1941 external tocotransducer  [place] applied  --  --  --  soft by palpation  --

## 2024-06-04 NOTE — PROGRESS NOTES
TERRI Licona  Antepartum Progress Note    Chief Complaint: Pyelonephritis    Subjective     Patient reports she is doing okay this am. She admits that right flank pain is still intense. Admits to pain that is mid to low back on both side but primarily on the right. She denies fever or chills at this time. Reports she is feeling good fetal movement of both babies. She does not intermittent contraction pains. Denies leakage of fluid or vaginal bleeding. Denies hematuria or dysuria since admission. She has been tolerating PO intake. Tolerating antibiotics well.     Objective     Vital Signs Range for the last 24 hours  Temp:  [98.4 °F (36.9 °C)-100.1 °F (37.8 °C)] 98.5 °F (36.9 °C)   BP: (111-140)/(59-83) 121/68   Heart Rate:  [] 108   Resp:  [16-18] 16   SpO2:  [97 %-98 %] 98 %       Device (Oxygen Therapy): room air     Fetal Heart Rate Assessment   Method: Fetal HR Assessment Method: external   Beats/min: Fetal HR (beats/min): 145   Baseline: Fetal HR Baseline: normal range   Varibility: Fetal HR Variability: moderate (amplitude range 6 to 25 bpm)   Accels: Fetal HR Accelerations: greater than/equal to 15 bpm, lasting at least 15 seconds   Decels: Fetal HR Decelerations: absent   Tracing Category:       Uterine Assessment   Method: Method: external tocotransducer   Frequency (min): Contraction Frequency (Minutes): x2   Ctx Count in 10 min: Contractions in 10 Minutes: 1   Duration:     Intensity: Contraction Intensity: no contractions   Intensity by IUPC:     Resting Tone: Uterine Resting Tone: soft by palpation   Resting Tone by IUPC:             Physical Exam:  General: Patient is well appearing and in no acute distress   Heart CVS exam: mild LE edema bilaterally, equal.   Lungs Chest: unlabored breathing, no audible wheezes   Abdomen Gravid, soft nontender   Extremities  Back Normal in appearance, ROM intact bilaterally  Tenderness to palpation, CVA tenderness present on right side       Laboratory  Results  WBC   Date Value Ref Range Status   06/03/2024 12.62 (H) 3.40 - 10.80 10*3/mm3 Final     RBC   Date Value Ref Range Status   06/03/2024 3.13 (L) 3.77 - 5.28 10*6/mm3 Final     Hemoglobin   Date Value Ref Range Status   06/03/2024 10.3 (L) 12.0 - 15.9 g/dL Final     Hematocrit   Date Value Ref Range Status   06/03/2024 29.2 (L) 34.0 - 46.6 % Final     MCV   Date Value Ref Range Status   06/03/2024 93.3 79.0 - 97.0 fL Final     MCH   Date Value Ref Range Status   06/03/2024 32.9 26.6 - 33.0 pg Final     MCHC   Date Value Ref Range Status   06/03/2024 35.3 31.5 - 35.7 g/dL Final     RDW   Date Value Ref Range Status   06/03/2024 13.2 12.3 - 15.4 % Final     RDW-SD   Date Value Ref Range Status   06/03/2024 43.8 37.0 - 54.0 fl Final     MPV   Date Value Ref Range Status   06/03/2024 11.5 6.0 - 12.0 fL Final     Platelets   Date Value Ref Range Status   06/03/2024 188 140 - 450 10*3/mm3 Final     Neutrophil %   Date Value Ref Range Status   06/03/2024 82.1 (H) 42.7 - 76.0 % Final     Lymphocyte %   Date Value Ref Range Status   06/03/2024 11.3 (L) 19.6 - 45.3 % Final     Monocyte %   Date Value Ref Range Status   06/03/2024 5.5 5.0 - 12.0 % Final     Eosinophil %   Date Value Ref Range Status   06/03/2024 0.0 (L) 0.3 - 6.2 % Final     Basophil %   Date Value Ref Range Status   06/03/2024 0.1 0.0 - 1.5 % Final     Immature Grans %   Date Value Ref Range Status   06/03/2024 1.0 (H) 0.0 - 0.5 % Final     Neutrophils, Absolute   Date Value Ref Range Status   06/03/2024 10.36 (H) 1.70 - 7.00 10*3/mm3 Final     Lymphocytes, Absolute   Date Value Ref Range Status   06/03/2024 1.43 0.70 - 3.10 10*3/mm3 Final     Monocytes, Absolute   Date Value Ref Range Status   06/03/2024 0.70 0.10 - 0.90 10*3/mm3 Final     Eosinophils, Absolute   Date Value Ref Range Status   06/03/2024 0.00 0.00 - 0.40 10*3/mm3 Final     Basophils, Absolute   Date Value Ref Range Status   06/03/2024 0.01 0.00 - 0.20 10*3/mm3 Final     Immature  Grans, Absolute   Date Value Ref Range Status   2024 0.12 (H) 0.00 - 0.05 10*3/mm3 Final     nRBC   Date Value Ref Range Status   2024 0.2 0.0 - 0.2 /100 WBC Final     Lab Results   Component Value Date    GLUCOSE 106 (H) 2024    BUN 9 2024    CREATININE 0.48 (L) 2024    EGFRRESULT 124.8 05/10/2023    EGFR 124.5 2024    BCR 18.8 2024    K 3.6 2024    CO2 18.0 (L) 2024    CALCIUM 7.9 (L) 2024    ALBUMIN 3.1 (L) 2024    BILITOT 0.4 2024    AST 25 2024    ALT 35 (H) 2024       Ultrasound Results:     [facility]    Result Date: 2024  PAT NAME: KVNG LUNA MED REC#: [id number] BIRTH DA: [id number] PAT GEND: F ACCOUNT#: [id number] PAT TYPE: I EXAM VINCENT: [id number] REF PHYS KVNG KHANNA ACCESSION [id number] Comparison Studies ================= The findings of this study are compared to the prior ultrasound study dated 24 Patient Status ============ Inpatient Indication ======== Di/Di twins. New GHTN/preeclampsia. AMA. Hx preeclampsia. Hx cardiomyopathy. Hx LEEP. Obesity BMI 32. Maternal Assessment ================== Height 162 cm Height (ft) 5 ft Height (in) 4 in Weight 86 kg Weight (lb) 190 lb BMI 32.84 kg/m² Method ======= Transabdominal ultrasound examination. View: Adequate view Pregnancy ========= Twin pregnancy. Dichorionic-diamniotic. Number of fetuses: 2 Dating ====== Method of dating: based on stated SUMMER GA by prior assessment 32 w + 2 d SUMMER by prior assessment: 2024 Ultrasound examination on: 2024 GA by U/S based upon: AC, BPD, Femur, HC GA by U/S 31 w + 6 d SUMMER by U/S: 2024 GA by U/S based upon (Fetus 2): AC, BPD, Femur, HC GA by U/S (Fetus 2) 32 w + 5 d SUMMER by U/S (Fetus 2): 2024 Previous dating: based on stated SUMMER, selected on 2024 Agreed SUMMER of previous datin2024 Assigned: based on stated SUMMER, selected on 2024 Assigned GA 32 w + 2 d Assigned  SUMMER: 7/23/2024 Pregnancy length 280 d Fetal Biometry ============ Standard BPD 75.7 mm 30w 3d        4%        Hadlock .9 mm 35w 2d        96%        Geovanna .6 mm 32w 4d        20%        Hadlock Cerebellum tr 39.0 mm 31w 4d        15%        Hill .7 mm 31w 2d        23%        Hadlock Femur 64.4 mm 33w 2d        64%        Hadlock Humerus 54.9 mm 32w 0d        44%        Geovanna HC / AC 1.08 EFW 1,875 g         41%        Alfredo EFW discordance 5.4 % EFW (lb) 4 lb EFW (oz) 2 oz EFW by: Hadlock (BPD-HC-AC-FL) Extended Cav. septi pel. tr 5.1 mm  5.3 mm CM 5.3 mm         6%        Nicolaides Head / Face / Neck Cephalic index 0.71         <1%        Nicolaides Extremities / Bony Struc FL / BPD 0.85 FL / HC 0.22 FL / AC 0.24 Other Structures  bpm Fetal Biometry ============ Standard BPD 80.6 mm 32w 3d        44%        Hadlock .7 mm 35w 2d        95%        Geovanna .7 mm 33w 5d        52%        Hadlock Cerebellum tr 38.2 mm 31w 1d        9%        Hill .7 mm 32w 4d        59%        Hadlock Femur 61.4 mm 31w 6d        26%        Hadlock Humerus 53.7 mm 31w 2d        26%        Geovanna HC / AC 1.06 EFW 1,982 g         50%        Alfredo EFW discordance 5.4 % EFW (lb) 4 lb EFW (oz) 6 oz EFW by: Hadlock (BPD-HC-AC-FL) Extended Cav. septi pel. tr 4.9 mm  4.9 mm CM 8.6 mm         83%        Nicolaides Head / Face / Neck Cephalic index 0.76         11%        Nicolaides Extremities / Bony Struc FL / BPD 0.76 FL / HC 0.20 FL / AC 0.21 Other Structures  bpm General Evaluation ================ Cardiac activity present.  bpm. Fetal movements present. Presentation cephalic, maternal left. Placenta Placental site: posterior. Amniotic fluid Amount of AF: normal. MVP 4.7 cm. General Evaluation ================ Cardiac activity present.  bpm. Fetal movements present. Presentation cephalic, maternal right. Placenta Placental site: anterior. Amniotic fluid Amount of  AF: normal. MVP 4.2 cm. Fetal Anatomy ============ Cranium: Normal Cavum septi pellucidi: Normal Cerebellum: Normal Cisterna magna: Normal Head / Neck Rt lateral ventricle: Normal Lt lateral ventricle: Normal 4-chamber view: Appears normal RVOT view: Appears normal LVOT view: Appears normal Stomach: Appears normal Kidneys: Appears normal Bladder: Appears normal Gender: female Wants to know gender: yes Fetal Anatomy ============ Cranium: Normal Cavum septi pellucidi: Normal Cerebellum: Normal Cisterna magna: Normal Head / Neck Rt lateral ventricle: Normal Lt lateral ventricle: Normal Profile: Normal 4-chamber view: Appears normal RVOT view: Appears normal LVOT view: Appears normal Stomach: Appears normal Kidneys: Appears normal Bladder: Appears normal Gender: female Wants to know gender: yes Fetal Doppler =========== Arterial Umbilical A PI 1.19         92%        Ranjan Umbilical A RI 0.71         89%        Ranjan Umbilical A PS -53.80 cm/s Umbilical A ED 15.13 cm/s Umbilical A TAmax -33.01 cm/s Umbilical A MD 13.12 cm/s Umbilical A S / D 3.54         89%        Ranjan Umbilical A  bpm Fetal Doppler =========== Arterial Umbilical A PI 1.08         81%        Ranjan Umbilical A RI 0.67         77%        Ranjan Umbilical A PS 36.71 cm/s         6%        Ebbing Umbilical A ED 12.13 cm/s Umbilical A TAmax 22.76 cm/s         4%        Ebbing Umbilical A MD 11.86 cm/s Umbilical A S / D 3.03         70%        Ranjan Umbilical A  bpm Biophysical Profile =============== 2: Fetal breathing movements 2: Gross body movements 2: Fetal tone 2: Amniotic fluid volume 8/8 Biophysical profile score Biophysical Profile =============== 2: Fetal breathing movements 2: Gross body movements 2: Fetal tone 2: Amniotic fluid volume 8/8 Biophysical profile score Impression ========= Size consistent with dates both twins. No fetal anomalies were identified. Amniotic fluid volume is normal for both twins. Umbilical artery  S/D ratio is normal for both twins. Recommendation =============== Follow-up as clinically indicated. Coding ====== Description: 76816-26 Follow Up Ultrasound Description: 76816-26-59 Follow up Ultrasound additional fetus Description: 76819-26 BPP without NST Description: 76819-26-59 BPP without NST additional fetus Description: 76820-26 Doppler Umbilical Artery Description: 76820-26-59 Doppler Umbilical Artery add'l gestation Sonographer: Breonna Early RDMS Physician: Doug Milligan, MD, FACOG Electronically signed by: Doug Milligan, MD, FACOG at: 2024/05/30 08:01      Assessment/Plan  Di/Di Pregnancy @ 33w0d   Pyelonephritis   GHTN   History postpartum cardiomyopathy    AMA     1. Current management on APU    2. Pyelonephritis   -Tmax on intake 100.1 6/3 1947  -Currently on rocephin every Q 24h  -Afebrile since admission  -Current management of pain with tylenol and roxicodone PRN  -Will continue IV antibiotics through 48h afebrile  -Mild leukocytosis on intake-repeat labs this am  -Urine sent for culture  -Blood cultures ordered  -Discussed will need PO antibiotics continued through remainder of pregnancy  -Will order renal US bilaterally to r/o presences of stone    3. FWB-Di/Di Twin Gestation  -S/p ANCE 5/30 and 5/31   -TID monitoring  -PDC US from 5/30 as listed above  -Will order BPP for tomorrow am    4. GHTN  -BP normotensive since intake  -Asymptomatic  -Will repeat labs     5. Diet  -Regular    6. Activity  -Ad ludmila    7. DVT PPX  -SCDs    Dispo: Continue care on APU through 48h afebrile. Will follow urine and blood cultures.       Geneva Mcneal DO  6/4/2024  12:14 EDT

## 2024-06-04 NOTE — H&P
[facility]  Obstetric History and Physical    Chief Complaint   Patient presents with    right flank pain       Subjective     Patient is a 39 y.o. female  currently at 32w6d, who presents with flank pain and fever/chills that started last night.    Her prenatal care is complicated by  multiple gestation  DC/DA twins.  Her previous obstetric/gynecological history is noted for is non-contributory.    The following portions of the patients history were reviewed and updated as appropriate: current medications, allergies, past medical history, past surgical history, past family history, past social history, and problem list .       Prenatal Information:  Prenatal Results       Initial Prenatal Labs       Test Value Reference Range Date Time    Hemoglobin  12.2 g/dL 12.0 - 15.9 24       12.3 g/dL 12.0 - 15.9 23    Hematocrit  34.9 % 34.0 - 46.6 24       35.5 % 34.0 - 46.6 23    Platelets  223 10*3/mm3 140 - 450 24       258 10*3/mm3 140 - 450 23    Rubella IgG  5.82 index Immune >0.99 23 173    Hepatitis B SAg  Non-Reactive  Non-Reactive 23 173    Hepatitis C Ab  Non-Reactive  Non-Reactive 23 173    RPR        T. Pallidum Ab   Non-Reactive  Non-Reactive 24 0004    ABO  O   24 0004    Rh  Positive   24 0004    Antibody Screen  Negative   23 173    HIV  Non-Reactive  Non-Reactive 23 173    Urine Culture  No growth   24 1450       >100,000 CFU/mL Escherichia coli   23 1739    Gonorrhea  Negative  Negative 23 1739    Chlamydia  Negative  Negative 23 1739    TSH  1.859 UIU/mL 0.350 - 5.350 12/01/15 1034    HgB A1c         Varicella IgG        HgB Electrophoresis         Cystic fibrosis                   Fetal testing        Test Value Reference Range Date Time    NIPT        MSAFP        AFP-4                  2nd and 3rd Trimester       Test Value Reference Range Date Time     Hemoglobin (repeated)  10.1 g/dL 12.0 - 15.9 05/30/24 0956       10.1 g/dL 12.0 - 15.9 05/30/24 0004       11.5 g/dL 12.0 - 15.9 05/18/24 1941    Hematocrit (repeated)  29.1 % 34.0 - 46.6 05/30/24 0956       29.7 % 34.0 - 46.6 05/30/24 0004       32.7 % 34.0 - 46.6 05/18/24 1941    Platelets   214 10*3/mm3 140 - 450 05/30/24 0956       221 10*3/mm3 140 - 450 05/30/24 0004       182 10*3/mm3 140 - 450 05/18/24 1941       223 10*3/mm3 140 - 450 01/30/24 1930       258 10*3/mm3 140 - 450 12/08/23 1739    GCT        Antibody Screen (repeated)  Negative   05/30/24 0004    3rd TM syphilis scrn (repeated)  RPR         3rd TM syphilis scrn (repeated) FTA        GTT Fasting        GTT 1 Hr        GTT 2 Hr        GTT 3 Hr        Group B Strep                  Other testing        Test Value Reference Range Date Time    Parvo IgG         CMV IgG                   Drug Screening       Test Value Reference Range Date Time    Amphetamine Screen  Negative  Negative 12/08/23 1739    Barbiturate Screen  Negative  Negative 12/08/23 1739    Benzodiazepine Screen  Negative  Negative 12/08/23 1739    Methadone Screen  Negative  Negative 12/08/23 1739    Phencyclidine Screen  Negative  Negative 12/08/23 1739    Opiates Screen  Negative  Negative 12/08/23 1739    THC Screen  Negative  Negative 12/08/23 1739    Cocaine Screen  Negative  Negative 12/08/23 1739    Propoxyphene Screen        Buprenorphine Screen  Negative  Negative 12/08/23 1739    Methamphetamine Screen  Negative  Negative 12/08/23 1739    Oxycodone Screen  Negative  Negative 12/08/23 1739    Tricyclic Antidepressants Screen  Negative  Negative 12/08/23 1739              Legend    ^: Historical                          External Prenatal Results       Pregnancy Outside Results - Transcribed From Office Records - See Scanned Records For Details       Test Value Date Time    ABO  O  05/30/24 0004    Rh  Positive  05/30/24 0004    Antibody Screen  Negative  05/30/24 0004        Negative  23 1739    Varicella IgG       Rubella  5.82 index 23 1739    Hgb  10.1 g/dL 24 0956       10.1 g/dL 24 0004       11.5 g/dL 24 1941       12.2 g/dL 24       12.3 g/dL 23 1739    Hct  29.1 % 24 0956       29.7 % 24 0004       32.7 % 24 194       34.9 % 240       35.5 % 23 1739    Glucose Fasting GTT       Glucose Tolerance Test 1 hour       Glucose Tolerance Test 3 hour       Gonorrhea (discrete)  Negative  23 1739    Chlamydia (discrete)  Negative  23 1739    RPR       VDRL       Syphilis Antibody       HBsAg  Non-Reactive  23 173    Herpes Simplex Virus PCR       Herpes Simplex VIrus Culture       HIV  Non-Reactive  23 1739    Hep C RNA Quant PCR       Hep C Antibody  Non-Reactive  23 1739    AFP       Group B Strep       GBS Susceptibility to Clindamycin       GBS Susceptibility to Erythromycin       Fetal Fibronectin       Genetic Testing, Maternal Blood                 Drug Screening       Test Value Date Time    NIPT        Urine Drug Screen       Amphetamine Screen  Negative  23 1739    Barbiturate Screen  Negative  23 1739    Benzodiazepine Screen  Negative  23 1739    Methadone Screen  Negative  23 1739    Phencyclidine Screen  Negative  23 1739    Opiates Screen  Negative  23 1739    THC Screen  Negative  23 1739    Cocaine Screen       Propoxyphene Screen       Buprenorphine Screen  Negative  23 1739    Methamphetamine Screen       Oxycodone Screen  Negative  23 1739              Legend    ^: Historical                             Past OB History:     OB History    Para Term  AB Living   7 4 4 0 2 4   SAB IAB Ectopic Molar Multiple Live Births   2 0 0 0 0 4      # Outcome Date GA Lbr Noah/2nd Weight Sex Type Anes PTL Lv   7 Current            6 Term 23 38w4d 15:53 / 00:17 3790 g (8 lb 5.7 oz) M Vag-Spont EPI N  JONN      Name: Chapito      Apgar1: 8  Apgar5: 9   5 Term 06/14/16 37w4d  3260 g (7 lb 3 oz) M Vag-Spont EPI  JONN      Complications: Pregnancy-induced hypertension      Name: Chente   4 Term 04/04/15 38w2d  3459 g (7 lb 10 oz) F Vag-Spont EPI  JONN      Complications: Pregnancy-induced hypertension, Preeclampsia in postpartum period, Pulmonary edema      Name: Carrington   3 SAB 2014 6w0d          2 SAB 2013           1 Term 07/08/10 39w0d  2977 g (6 lb 9 oz) M Vag-Spont EPI  JONN      Name: Mayelin      Obstetric Comments   2010 - Mayelin (FOB #1)   2015 - Carrington (FOB #2)   2016 - Chente (FOB #3)   2023 - Chapito (FOB #3)   Current  (FOB #3)       Past Medical History: Past Medical History:   Diagnosis Date    History of gestational diabetes - with 4th pregnancy 2023    Hx of postpatum cardiomyopathy 2023    LGSIL on Pap smear of cervix 07/12/2023    Tx wtih LEEP    Postpartum anxiety 2023    Preeclampsia in postpartum period 02/2015    Along with pulmonary edema      Past Surgical History Past Surgical History:   Procedure Laterality Date    LEEP  09/2023    Path = FELICIA 2 with clear margins      Family History: Family History   Problem Relation Age of Onset    Hypertension Mother     Diabetes type II Father     Hypertension Father     Other Son         bifid uvula and polydactyly    Breast cancer Neg Hx     Colon cancer Neg Hx     Ovarian cancer Neg Hx       Social History:  reports that she has never smoked. She has never been exposed to tobacco smoke. She has never used smokeless tobacco.   reports that she does not currently use alcohol after a past usage of about 2.0 standard drinks of alcohol per week.   reports no history of drug use.        Review of Systems      Objective     Vital Signs Range for the last 24 hours  Temperature: Temp:  [100.1 °F (37.8 °C)] 100.1 °F (37.8 °C)   Temp Source: Temp src: Oral   BP: BP: (140)/(75) 140/75   Pulse:     Respirations: Resp:  [18] 18   SPO2:     O2 Amount (l/min):     O2  Devices Device (Oxygen Therapy): room air   Weight:       Physical Examination: General appearance - anxious, ill-appearing, and crying  Chest - unlabored respirations  Heart - no JVD  Abdomen - soft, nontender, nondistended, no masses or organomegaly, gravid  Back exam - full range of motion, no tenderness, palpable spasm or pain on motion, right sided CVA tenderness  Neurological - alert, oriented, normal speech, no focal findings or movement disorder noted  Musculoskeletal - no joint tenderness, deformity or swelling  Extremities - peripheral pulses normal, no pedal edema, no clubbing or cyanosis  Skin - normal coloration and turgor, no rashes, no suspicious skin lesions noted    Presentation: Not assessed   Cervix: Exam by:     Dilation:     Effacement:     Station:       Fetal Heart Rate Assessment   Method:  external   Beats/min:   180/160   Baseline:     Variability:  moderate   Accels:  present   Decels:  absent   Tracing Category:  Reactive but tachycardic     Uterine Assessment   Method:  irregular   Frequency (min):     Ctx Count in 10 min:     Duration:     Intensity:     Intensity by IUPC:     Resting Tone:     Resting Tone by IUPC:     [place] Units:       Laboratory Results: pending  Radiology Review: none  Other Studies: none    Assessment & Plan       Flank pain    Fever      Assessment & Plan    Assessment:  1.  Intrauterine pregnancy at 32w6d gestation with reactive fetal status.    2.  I am concerned for pyelonephritis. CBC, lactic acid, CMP      Plan:  I transitioned care to Dr. Malin at 2000. He will determine ultimate disposition and plan.      Melvi Shepherd MD  6/3/2024  20:07 EDT

## 2024-06-04 NOTE — PROGRESS NOTES
TERRI Licona  Antepartum Progress Note    Chief Complaint: Pyelonephritis    Subjective     Patient reports she is feeling okay at this time. States that she did have increase in pain and felt feverish ~ 1 hour ago but feeling better since taking tylenol. Reports good FM. Denies LOF, VB.     Objective     Vital Signs Range for the last 24 hours  Temp:  [98.4 °F (36.9 °C)-101.2 °F (38.4 °C)] 100.3 °F (37.9 °C)   BP: (111-140)/(59-90) 138/90   Heart Rate:  [] 118   Resp:  [16-20] 20   SpO2:  [97 %-98 %] 98 %       Device (Oxygen Therapy): room air     Fetal Heart Rate Assessment   Method: Fetal HR Assessment Method: external   Beats/min: Fetal HR (beats/min): 145   Baseline: Fetal HR Baseline: normal range   Varibility: Fetal HR Variability: moderate (amplitude range 6 to 25 bpm)   Accels: Fetal HR Accelerations: greater than/equal to 15 bpm, lasting at least 15 seconds   Decels: Fetal HR Decelerations: absent   Tracing Category:       Uterine Assessment   Method: Method: external tocotransducer   Frequency (min): Contraction Frequency (Minutes): x2   Ctx Count in 10 min: Contractions in 10 Minutes: 1   Duration:     Intensity: Contraction Intensity: no contractions   Intensity by IUPC:     Resting Tone: Uterine Resting Tone: soft by palpation   Resting Tone by IUPC:             Physical Exam:  General: Patient is well appearing and in no acute distress   Heart CVS exam: mild LE edema bilaterally, equal.   Lungs Chest: unlabored breathing, no audible wheezes   Abdomen Gravid, soft nontender   Extremities   Normal in appearance, ROM intact bilaterally         Laboratory Results  WBC   Date Value Ref Range Status   06/04/2024 13.73 (H) 3.40 - 10.80 10*3/mm3 Final     RBC   Date Value Ref Range Status   06/04/2024 3.32 (L) 3.77 - 5.28 10*6/mm3 Final     Hemoglobin   Date Value Ref Range Status   06/04/2024 10.8 (L) 12.0 - 15.9 g/dL Final     Hematocrit   Date Value Ref Range Status   06/04/2024 31.8 (L) 34.0 - 46.6  % Final     MCV   Date Value Ref Range Status   06/04/2024 95.8 79.0 - 97.0 fL Final     MCH   Date Value Ref Range Status   06/04/2024 32.5 26.6 - 33.0 pg Final     MCHC   Date Value Ref Range Status   06/04/2024 34.0 31.5 - 35.7 g/dL Final     RDW   Date Value Ref Range Status   06/04/2024 13.3 12.3 - 15.4 % Final     RDW-SD   Date Value Ref Range Status   06/04/2024 46.5 37.0 - 54.0 fl Final     MPV   Date Value Ref Range Status   06/04/2024 11.0 6.0 - 12.0 fL Final     Platelets   Date Value Ref Range Status   06/04/2024 190 140 - 450 10*3/mm3 Final     Neutrophil %   Date Value Ref Range Status   06/04/2024 83.1 (H) 42.7 - 76.0 % Final     Lymphocyte %   Date Value Ref Range Status   06/04/2024 11.1 (L) 19.6 - 45.3 % Final     Monocyte %   Date Value Ref Range Status   06/04/2024 4.7 (L) 5.0 - 12.0 % Final     Eosinophil %   Date Value Ref Range Status   06/04/2024 0.0 (L) 0.3 - 6.2 % Final     Basophil %   Date Value Ref Range Status   06/04/2024 0.1 0.0 - 1.5 % Final     Immature Grans %   Date Value Ref Range Status   06/04/2024 1.0 (H) 0.0 - 0.5 % Final     Neutrophils, Absolute   Date Value Ref Range Status   06/04/2024 11.40 (H) 1.70 - 7.00 10*3/mm3 Final     Lymphocytes, Absolute   Date Value Ref Range Status   06/04/2024 1.53 0.70 - 3.10 10*3/mm3 Final     Monocytes, Absolute   Date Value Ref Range Status   06/04/2024 0.65 0.10 - 0.90 10*3/mm3 Final     Eosinophils, Absolute   Date Value Ref Range Status   06/04/2024 0.00 0.00 - 0.40 10*3/mm3 Final     Basophils, Absolute   Date Value Ref Range Status   06/04/2024 0.01 0.00 - 0.20 10*3/mm3 Final     Immature Grans, Absolute   Date Value Ref Range Status   06/04/2024 0.14 (H) 0.00 - 0.05 10*3/mm3 Final     nRBC   Date Value Ref Range Status   06/04/2024 0.0 0.0 - 0.2 /100 WBC Final     Lab Results   Component Value Date    GLUCOSE 126 (H) 06/04/2024    BUN 5 (L) 06/04/2024    CREATININE 0.49 (L) 06/04/2024    EGFRRESULT 124.8 05/10/2023    EGFR 123.9  2024    BCR 10.2 2024    K 3.6 2024    CO2 18.0 (L) 2024    CALCIUM 8.0 (L) 2024    ALBUMIN 3.3 (L) 2024    BILITOT 0.7 2024    AST 24 2024    ALT 36 (H) 2024       Ultrasound Results:     formerly [facility]    Result Date: 2024  PAT NAME: KVNG LUNA MED REC#: [id number] BIRTH DA: 1985 PAT GEND: F ACCOUNT#: [id number] PAT TYPE: I EXAM VINCENT: [id number] REF KVNG HER ACCESSION [id number] Comparison Studies ================= The findings of this study are compared to the prior ultrasound study dated 24 Patient Status ============ Inpatient Indication ======== Di/Di twins. New GHTN/preeclampsia. AMA. Hx preeclampsia. Hx cardiomyopathy. Hx LEEP. Obesity BMI 32. Maternal Assessment ================== Height 162 cm Height (ft) 5 ft Height (in) 4 in Weight 86 kg Weight (lb) 190 lb BMI 32.84 kg/m² Method ======= Transabdominal ultrasound examination. View: Adequate view Pregnancy ========= Twin pregnancy. Dichorionic-diamniotic. Number of fetuses: 2 Dating ====== Method of dating: based on stated SUMMER GA by prior assessment 32 w + 2 d SUMMER by prior assessment: 2024 Ultrasound examination on: 2024 GA by U/S based upon: AC, BPD, Femur, HC GA by U/S 31 w + 6 d SUMMER by U/S: 2024 GA by U/S based upon (Fetus 2): AC, BPD, Femur, HC GA by U/S (Fetus 2) 32 w + 5 d SUMMER by U/S (Fetus 2): 2024 Previous dating: based on stated SUMMER, selected on 2024 Agreed SUMMER of previous datin2024 Assigned: based on stated SUMMER, selected on 2024 Assigned GA 32 w + 2 d Assigned SUMMER: 2024 Pregnancy length 280 d Fetal Biometry ============ Standard BPD 75.7 mm 30w 3d        4%        Hadlock .9 mm 35w 2d        96%        Geovanna .6 mm 32w 4d        20%        Hadlock Cerebellum tr 39.0 mm 31w 4d        15%        Hill .7 mm 31w 2d        23%        Hadlock Femur 64.4 mm 33w 2d        64%         Hadlock Humerus 54.9 mm 32w 0d        44%        Geovanna HC / AC 1.08 EFW 1,875 g         41%        Alfredo EFW discordance 5.4 % EFW (lb) 4 lb EFW (oz) 2 oz EFW by: Hadlock (BPD-HC-AC-FL) Extended Cav. septi pel. tr 5.1 mm  5.3 mm CM 5.3 mm         6%        Nicolaides Head / Face / Neck Cephalic index 0.71         <1%        Nicolaides Extremities / Bony Struc FL / BPD 0.85 FL / HC 0.22 FL / AC 0.24 Other Structures  bpm Fetal Biometry ============ Standard BPD 80.6 mm 32w 3d        44%        Hadlock .7 mm 35w 2d        95%        Geovanna .7 mm 33w 5d        52%        Hadlock Cerebellum tr 38.2 mm 31w 1d        9%        Hill .7 mm 32w 4d        59%        Hadlock Femur 61.4 mm 31w 6d        26%        Hadlock Humerus 53.7 mm 31w 2d        26%        Geovanna HC / AC 1.06 EFW 1,982 g         50%        Alfredo EFW discordance 5.4 % EFW (lb) 4 lb EFW (oz) 6 oz EFW by: Hadlock (BPD-HC-AC-FL) Extended Cav. septi pel. tr 4.9 mm  4.9 mm CM 8.6 mm         83%        Nicolaides Head / Face / Neck Cephalic index 0.76         11%        Nicolaides Extremities / Bony Struc FL / BPD 0.76 FL / HC 0.20 FL / AC 0.21 Other Structures  bpm General Evaluation ================ Cardiac activity present.  bpm. Fetal movements present. Presentation cephalic, maternal left. Placenta Placental site: posterior. Amniotic fluid Amount of AF: normal. MVP 4.7 cm. General Evaluation ================ Cardiac activity present.  bpm. Fetal movements present. Presentation cephalic, maternal right. Placenta Placental site: anterior. Amniotic fluid Amount of AF: normal. MVP 4.2 cm. Fetal Anatomy ============ Cranium: Normal Cavum septi pellucidi: Normal Cerebellum: Normal Cisterna magna: Normal Head / Neck Rt lateral ventricle: Normal Lt lateral ventricle: Normal 4-chamber view: Appears normal RVOT view: Appears normal LVOT view: Appears normal Stomach: Appears normal Kidneys: Appears normal  Bladder: Appears normal Gender: female Wants to know gender: yes Fetal Anatomy ============ Cranium: Normal Cavum septi pellucidi: Normal Cerebellum: Normal Cisterna magna: Normal Head / Neck Rt lateral ventricle: Normal Lt lateral ventricle: Normal Profile: Normal 4-chamber view: Appears normal RVOT view: Appears normal LVOT view: Appears normal Stomach: Appears normal Kidneys: Appears normal Bladder: Appears normal Gender: female Wants to know gender: yes Fetal Doppler =========== Arterial Umbilical A PI 1.19         92%        Ranjan Umbilical A RI 0.71         89%        Ranjan Umbilical A PS -53.80 cm/s Umbilical A ED 15.13 cm/s Umbilical A TAmax -33.01 cm/s Umbilical A MD 13.12 cm/s Umbilical A S / D 3.54         89%        Ranjan Umbilical A  bpm Fetal Doppler =========== Arterial Umbilical A PI 1.08         81%        Ranjan Umbilical A RI 0.67         77%        Ranjan Umbilical A PS 36.71 cm/s         6%        Ebbing Umbilical A ED 12.13 cm/s Umbilical A TAmax 22.76 cm/s         4%        Ebbing Umbilical A MD 11.86 cm/s Umbilical A S / D 3.03         70%        Ranjan Umbilical A  bpm Biophysical Profile =============== 2: Fetal breathing movements 2: Gross body movements 2: Fetal tone 2: Amniotic fluid volume 8/8 Biophysical profile score Biophysical Profile =============== 2: Fetal breathing movements 2: Gross body movements 2: Fetal tone 2: Amniotic fluid volume 8/8 Biophysical profile score Impression ========= Size consistent with dates both twins. No fetal anomalies were identified. Amniotic fluid volume is normal for both twins. Umbilical artery S/D ratio is normal for both twins. Recommendation =============== Follow-up as clinically indicated. Coding ====== Description: 76816-26 Follow Up Ultrasound Description: 76816-26-59 Follow up Ultrasound additional fetus Description: 76819-26 BPP without NST Description: 09595-51-46 BPP without NST additional fetus Description: 86729-23  Doppler Umbilical Artery Description: 84925-94-85 Doppler Umbilical Artery add'l gestation Sonographer: Breonna Early [facility] Physician: Doug Milligan, MD, FACOG Electronically signed by: Doug Milligan, MD, FACOG at: 2024/05/30 08:01      Assessment/Plan  Di/Di Pregnancy @ 33w0d   Pyelonephritis   GHTN   History postpartum cardiomyopathy    AMA     1. Current management on APU    2. Pyelonephritis   -Temp on intake 100.1 6/3 1947, Tmax 101.2 6/4 1557  -Currently on rocephin every Q 24h  -Continue IV fluid resuscitation  -Tylenol scheduled  -Sepsis protocol workup performed due to elevation in temp with tachycardia. CXR-normal. Resp panel-in process  -Urine and blood cultures already in process  -Continue roxicodone PRN  -Will continue IV antibiotics through 48h afebrile  -Repeat labs ordered for 2100 and tomorrow am  -Renal US-kidneys in upper range of normal size, larger right kidney than left, but no evidence of obstructive uropathy. Prominent left renal cortical thickness noted, no focal renal cortical lesion or other focal abnormality id demonstrated of either kidney    3. FWB-Di/Di Twin Gestation  -S/p ANCE 5/30 and 5/31   -TID monitoring  -PDC US from 5/30 as listed above  -BPP ordered for tomorrow am    4. GHTN  -BP normotensive since intake  -Asymptomatic  -Repeat labs normal    5. Diet  -Regular    6. Activity  -Ad ludmila    7. DVT PPX  -SCDs    Dispo: Continue care on APU through 48h afebrile. Will follow urine and blood cultures.       Geneva Mcneal DO  6/4/2024  17:35 EDT

## 2024-06-05 ENCOUNTER — APPOINTMENT (OUTPATIENT)
Dept: WOMENS IMAGING | Facility: HOSPITAL | Age: 39
End: 2024-06-05
Payer: COMMERCIAL

## 2024-06-05 LAB
ALBUMIN SERPL-MCNC: 2.8 G/DL (ref 3.5–5.2)
ALBUMIN/GLOB SERPL: 0.9 G/DL
ALP SERPL-CCNC: 115 U/L (ref 39–117)
ALT SERPL W P-5'-P-CCNC: 22 U/L (ref 1–33)
ANION GAP SERPL CALCULATED.3IONS-SCNC: 8 MMOL/L (ref 5–15)
AST SERPL-CCNC: 16 U/L (ref 1–32)
BILIRUB SERPL-MCNC: 0.3 MG/DL (ref 0–1.2)
BUN SERPL-MCNC: 7 MG/DL (ref 6–20)
BUN/CREAT SERPL: 13.5 (ref 7–25)
CALCIUM SPEC-SCNC: 8.4 MG/DL (ref 8.6–10.5)
CHLORIDE SERPL-SCNC: 110 MMOL/L (ref 98–107)
CO2 SERPL-SCNC: 19 MMOL/L (ref 22–29)
CREAT SERPL-MCNC: 0.52 MG/DL (ref 0.57–1)
DEPRECATED RDW RBC AUTO: 46 FL (ref 37–54)
EGFRCR SERPLBLD CKD-EPI 2021: 122.1 ML/MIN/1.73
ERYTHROCYTE [DISTWIDTH] IN BLOOD BY AUTOMATED COUNT: 13.6 % (ref 12.3–15.4)
GLOBULIN UR ELPH-MCNC: 3 GM/DL
GLUCOSE SERPL-MCNC: 90 MG/DL (ref 65–99)
HCT VFR BLD AUTO: 26.6 % (ref 34–46.6)
HGB BLD-MCNC: 9.1 G/DL (ref 12–15.9)
LDH SERPL-CCNC: 225 U/L (ref 135–214)
MCH RBC QN AUTO: 32.5 PG (ref 26.6–33)
MCHC RBC AUTO-ENTMCNC: 34.2 G/DL (ref 31.5–35.7)
MCV RBC AUTO: 95 FL (ref 79–97)
PLATELET # BLD AUTO: 162 10*3/MM3 (ref 140–450)
PMV BLD AUTO: 10.9 FL (ref 6–12)
POTASSIUM SERPL-SCNC: 3.7 MMOL/L (ref 3.5–5.2)
PROT SERPL-MCNC: 5.8 G/DL (ref 6–8.5)
QT INTERVAL: 320 MS
QTC INTERVAL: 438 MS
RBC # BLD AUTO: 2.8 10*6/MM3 (ref 3.77–5.28)
SODIUM SERPL-SCNC: 137 MMOL/L (ref 136–145)
WBC NRBC COR # BLD AUTO: 7.03 10*3/MM3 (ref 3.4–10.8)

## 2024-06-05 PROCEDURE — 25010000002 CEFTRIAXONE PER 250 MG: Performed by: OBSTETRICS & GYNECOLOGY

## 2024-06-05 PROCEDURE — 76820 UMBILICAL ARTERY ECHO: CPT

## 2024-06-05 PROCEDURE — 76819 FETAL BIOPHYS PROFIL W/O NST: CPT | Performed by: OBSTETRICS & GYNECOLOGY

## 2024-06-05 PROCEDURE — 80053 COMPREHEN METABOLIC PANEL: CPT | Performed by: STUDENT IN AN ORGANIZED HEALTH CARE EDUCATION/TRAINING PROGRAM

## 2024-06-05 PROCEDURE — 76819 FETAL BIOPHYS PROFIL W/O NST: CPT

## 2024-06-05 PROCEDURE — 83615 LACTATE (LD) (LDH) ENZYME: CPT | Performed by: STUDENT IN AN ORGANIZED HEALTH CARE EDUCATION/TRAINING PROGRAM

## 2024-06-05 PROCEDURE — 76820 UMBILICAL ARTERY ECHO: CPT | Performed by: OBSTETRICS & GYNECOLOGY

## 2024-06-05 PROCEDURE — 63710000001 DIPHENHYDRAMINE PER 50 MG: Performed by: OBSTETRICS & GYNECOLOGY

## 2024-06-05 PROCEDURE — 59025 FETAL NON-STRESS TEST: CPT

## 2024-06-05 PROCEDURE — 85027 COMPLETE CBC AUTOMATED: CPT | Performed by: STUDENT IN AN ORGANIZED HEALTH CARE EDUCATION/TRAINING PROGRAM

## 2024-06-05 RX ORDER — POLYETHYLENE GLYCOL 3350 17 G/17G
17 POWDER, FOR SOLUTION ORAL DAILY
Status: DISCONTINUED | OUTPATIENT
Start: 2024-06-05 | End: 2024-06-05

## 2024-06-05 RX ORDER — DIPHENHYDRAMINE HCL 25 MG
25 CAPSULE ORAL ONCE
Status: COMPLETED | OUTPATIENT
Start: 2024-06-06 | End: 2024-06-05

## 2024-06-05 RX ORDER — POLYETHYLENE GLYCOL 3350 17 G/17G
17 POWDER, FOR SOLUTION ORAL 2 TIMES DAILY
Status: DISCONTINUED | OUTPATIENT
Start: 2024-06-05 | End: 2024-06-06 | Stop reason: HOSPADM

## 2024-06-05 RX ADMIN — POLYETHYLENE GLYCOL 3350 17 G: 17 POWDER, FOR SOLUTION ORAL at 20:24

## 2024-06-05 RX ADMIN — ACETAMINOPHEN 1000 MG: 500 TABLET ORAL at 09:46

## 2024-06-05 RX ADMIN — ACETAMINOPHEN 1000 MG: 500 TABLET ORAL at 03:00

## 2024-06-05 RX ADMIN — DOCUSATE SODIUM 100 MG: 100 CAPSULE, LIQUID FILLED ORAL at 20:24

## 2024-06-05 RX ADMIN — SODIUM CHLORIDE 2000 MG: 900 INJECTION INTRAVENOUS at 22:32

## 2024-06-05 RX ADMIN — ACETAMINOPHEN 1000 MG: 500 TABLET ORAL at 21:20

## 2024-06-05 RX ADMIN — DOCUSATE SODIUM 100 MG: 100 CAPSULE, LIQUID FILLED ORAL at 09:46

## 2024-06-05 RX ADMIN — POLYETHYLENE GLYCOL 3350 17 G: 17 POWDER, FOR SOLUTION ORAL at 12:45

## 2024-06-05 RX ADMIN — ACETAMINOPHEN 1000 MG: 500 TABLET ORAL at 16:05

## 2024-06-05 RX ADMIN — DIPHENHYDRAMINE HYDROCHLORIDE 25 MG: 25 CAPSULE ORAL at 23:58

## 2024-06-05 NOTE — PROGRESS NOTES
To bedside for assessment for chest pain. By the time I arrived, the episode resolved and patient feeling well. VS reviewed - mildly tachycardic, O2 95% on RA. EKG performed, normal except for sinus tachycardia per my read - no STEMI. BNP / CBC / trop drawn per Dr Shepherd.     Will f/u labs. Suspect possible chest pain due to tachycardia which is not unexpected in the setting of sepsis. Anxiety may also be contributing.      Bhavna Pizano MD  06/04/24  20:48 EDT    [facility] Laborist

## 2024-06-05 NOTE — PROGRESS NOTES
TERRI Licona  Antepartum Progress Note    Chief Complaint: Pyelonephritis    Subjective     Patient reports feeling very well.  GFM x 2.  No flank pain.  No fevers, chills, body aches.  No LOF, VB, contractions. No HA, vision changes, RUQ/EG pain.    Objective     Vital Signs Range for the last 24 hours  Temp:  [97.7 °F (36.5 °C)-101.2 °F (38.4 °C)] 97.7 °F (36.5 °C)   BP: (112-152)/(56-90) 118/74   Heart Rate:  [] 88   Resp:  [18-20] 18   SpO2:  [88 %-99 %] 95 %       Device (Oxygen Therapy): room air     Fetal Heart Rate Assessment   Method: Fetal HR Assessment Method: external   Beats/min: Fetal HR (beats/min): 125; 130   Baseline: Fetal HR Baseline: normal range x 2   Varibility: Fetal HR Variability: moderate (amplitude range 6 to 25 bpm) x 2   Accels: Fetal HR Accelerations: greater than/equal to 15 bpm, lasting at least 15 seconds x 2    Decels: Fetal HR Decelerations: absent x 2   Tracing Category:  1 x 2     Uterine Assessment   Method: Method: external tocotransducer   Frequency (min): Contraction Frequency (Minutes): x1   Ctx Count in 10 min: Contractions in 10 Minutes: 1   Duration:     Intensity: Contraction Intensity: no contractions   Intensity by IUPC:     Resting Tone: Uterine Resting Tone: soft by palpation   Resting Tone by IUPC:             Physical Exam:  General: Patient is well appearing and in no acute distress   Heart CVS exam: mild LE edema bilaterally, equal.   Lungs Chest: unlabored breathing, no audible wheezes   Abdomen Gravid, soft nontender   Extremities   Normal in appearance, ROM intact bilaterally         Laboratory Results    Component      Latest Ref Rng 6/4/2024   proBNP      0.0 - 450.0 pg/mL 353.7        Component      Latest Ref Rng 6/4/2024   HS Troponin T      <14 ng/L 12        Component      Latest Ref Rng 6/4/2024   WBC      3.40 - 10.80 10*3/mm3 12.57 (H)    RBC      3.77 - 5.28 10*6/mm3 2.66 (L)    Hemoglobin      12.0 - 15.9 g/dL 8.7 (L)    Hematocrit      34.0  - 46.6 % 25.4 (L)    MCV      79.0 - 97.0 fL 95.5    MCH      26.6 - 33.0 pg 32.7    MCHC      31.5 - 35.7 g/dL 34.3    RDW      12.3 - 15.4 % 13.4    RDW-SD      37.0 - 54.0 fl 46.8    MPV      6.0 - 12.0 fL 11.3    Platelets      140 - 450 10*3/mm3 178       Component      Latest Ref Rng 6/4/2024   Glucose      65 - 99 mg/dL 103 (H)    BUN      6 - 20 mg/dL 7    Creatinine      0.57 - 1.00 mg/dL 0.58    Sodium      136 - 145 mmol/L 132 (L)    Potassium      3.5 - 5.2 mmol/L 3.8    Chloride      98 - 107 mmol/L 97 (L)    CO2      22.0 - 29.0 mmol/L 19.0 (L)    Calcium      8.6 - 10.5 mg/dL 8.0 (L)    Total Protein      6.0 - 8.5 g/dL 5.9 (L)    Albumin      3.5 - 5.2 g/dL 3.1 (L)    ALT (SGPT)      1 - 33 U/L 31    AST (SGOT)      1 - 32 U/L 20    Alkaline Phosphatase      39 - 117 U/L 128 (H)    Total Bilirubin      0.0 - 1.2 mg/dL 0.4    Globulin      gm/dL 2.8    A/G Ratio      g/dL 1.1    BUN/Creatinine Ratio      7.0 - 25.0  12.1    Anion Gap      5.0 - 15.0 mmol/L 16.0 (H)    eGFR      >60.0 mL/min/1.73 119.0           Ultrasound Results:       Impression  =========     Fetal testing is reassuring for both twins.     Amniotic fluid volume is normal.     Umbilical artery S/D ratio is normal.        Recommendation  ===============     Continue in hospital management.      Assessment/Plan  Di/Di Pregnancy @ 33w1d   Pyelonephritis   GHTN   History postpartum cardiomyopathy    AMA     1. Current management on APU    2. Pyelonephritis   -Temp on intake 100.1 6/3 1947, Tmax 101.2 6/4 1557  -Currently on rocephin every Q 24h  -Continue IV fluid resuscitation  -Tylenol scheduled  -Sepsis protocol workup performed due to elevation in temp with tachycardia. CXR-normal. Resp panel-neg  -Urine and blood cultures in process  -Continue roxicodone PRN  -Will continue IV antibiotics through 48h afebrile  -Renal US-kidneys in upper range of normal size, larger right kidney than left, but no evidence of obstructive uropathy.  Prominent left renal cortical thickness noted, no focal renal cortical lesion or other focal abnormality is demonstrated of either kidney    3. FWB-Di/Di Twin Gestation  -S/p ANCE 5/30 and 5/31   -TID monitoring  -PDC US 5/30 wnl.  BPP this AM 8/8       4. GHTN  -BP normotensive to mild range since intake  -Asymptomatic  -labs normal    5. Chest pain: resolved spontaneously. Normal EKG except sinus tachycardia.  proBNP within normal limits. High sensitivity troponin negative    6. Diet  -Regular    7. Activity  -Ad ludmila    8. DVT PPX  -SCDs    Dispo: Continue care on APU through 48h afebrile. Will follow urine and blood cultures.       Karen Osorio MD  6/5/2024  11:52 EDT

## 2024-06-06 VITALS
HEART RATE: 100 BPM | DIASTOLIC BLOOD PRESSURE: 76 MMHG | SYSTOLIC BLOOD PRESSURE: 140 MMHG | TEMPERATURE: 98.6 F | RESPIRATION RATE: 16 BRPM | OXYGEN SATURATION: 92 %

## 2024-06-06 LAB — BACTERIA SPEC AEROBE CULT: ABNORMAL

## 2024-06-06 PROCEDURE — 25010000002 CEFTRIAXONE PER 250 MG: Performed by: OBSTETRICS & GYNECOLOGY

## 2024-06-06 RX ORDER — FERROUS SULFATE 325(65) MG
325 TABLET ORAL
Qty: 90 TABLET | Refills: 0 | Status: SHIPPED | OUTPATIENT
Start: 2024-06-06

## 2024-06-06 RX ORDER — FLUCONAZOLE 150 MG/1
150 TABLET ORAL ONCE
Qty: 1 TABLET | Refills: 0 | Status: SHIPPED | OUTPATIENT
Start: 2024-06-06 | End: 2024-06-06

## 2024-06-06 RX ORDER — POLYETHYLENE GLYCOL 3350 17 G/17G
17 POWDER, FOR SOLUTION ORAL 2 TIMES DAILY PRN
Qty: 510 G | Refills: 1 | Status: SHIPPED | OUTPATIENT
Start: 2024-06-06

## 2024-06-06 RX ORDER — AMPICILLIN 500 MG/1
500 CAPSULE ORAL 4 TIMES DAILY
Qty: 48 CAPSULE | Refills: 0 | Status: SHIPPED | OUTPATIENT
Start: 2024-06-06 | End: 2024-06-18

## 2024-06-06 RX ORDER — AMPICILLIN 500 MG/1
500 CAPSULE ORAL NIGHTLY
Qty: 30 CAPSULE | Refills: 1 | Status: SHIPPED | OUTPATIENT
Start: 2024-06-06

## 2024-06-06 RX ORDER — FERROUS SULFATE 325(65) MG
325 TABLET ORAL
Status: DISCONTINUED | OUTPATIENT
Start: 2024-06-06 | End: 2024-06-06 | Stop reason: HOSPADM

## 2024-06-06 RX ADMIN — SODIUM CHLORIDE 2000 MG: 900 INJECTION INTRAVENOUS at 19:11

## 2024-06-06 RX ADMIN — DOCUSATE SODIUM 100 MG: 100 CAPSULE, LIQUID FILLED ORAL at 09:05

## 2024-06-06 RX ADMIN — ACETAMINOPHEN 1000 MG: 500 TABLET ORAL at 03:55

## 2024-06-06 RX ADMIN — POLYETHYLENE GLYCOL 3350 17 G: 17 POWDER, FOR SOLUTION ORAL at 09:05

## 2024-06-06 NOTE — PROGRESS NOTES
TERRI Licona  Antepartum Progress Note    Chief Complaint: Pyelonephritis    Subjective     Patient reports feeling very well.  She would like to go home tonight if remains afebrile.  GFM x 2.  No flank pain.  No fevers, chills, body aches.  No LOF, VB, contractions. No HA, vision changes, RUQ/EG pain.    Objective     Vital Signs Range for the last 24 hours  Temp:  [97.7 °F (36.5 °C)-98.3 °F (36.8 °C)] 97.9 °F (36.6 °C)   BP: (112-136)/(65-82) 129/79   Heart Rate:  [83-96] 93   Resp:  [16] 16   SpO2:  [88 %-99 %] 95 %             Fetal Heart Rate Assessment   Method: Fetal HR Assessment Method: external   Beats/min: Fetal HR (beats/min): 130; 140   Baseline: Fetal HR Baseline: normal range x 2   Varibility: Fetal HR Variability: moderate (amplitude range 6 to 25 bpm) x 2   Accels: Fetal HR Accelerations: greater than/equal to 15 bpm, lasting at least 15 seconds x 2    Decels: Fetal HR Decelerations: absent x 2   Tracing Category:  1 x 2     Uterine Assessment   Method: Method: external tocotransducer   Frequency (min): Contraction Frequency (Minutes): x1   Ctx Count in 10 min: Contractions in 10 Minutes: 1   Duration:     Intensity: Contraction Intensity: no contractions   Intensity by IUPC:     Resting Tone: Uterine Resting Tone: soft by palpation   Resting Tone by IUPC:             Physical Exam:  General: Patient is well appearing and in no acute distress   Heart CVS exam: mild LE edema bilaterally, equal.   Lungs Chest: unlabored breathing, no audible wheezes   Abdomen Gravid, soft nontender   Extremities  MSK Normal in appearance, ROM intact bilaterally  No CVA tenderness       Laboratory Results  No new         Ultrasound Results:     no new      Assessment/Plan  Di/Di Pregnancy @ 33w2d    Pyelonephritis   GHTN   History postpartum cardiomyopathy    AMA     1. Current management on APU    2. Pyelonephritis   -Temp on intake 100.1 6/3 1947, Tmax 101.2 6/4 1557  -Currently on rocephin every Q 24h  -Continue IV  fluid resuscitation  -Sepsis protocol workup performed due to elevation in temp with tachycardia. CXR-normal. Resp panel-neg  -Urine culture: GNB.  KULWINDER pending  -Will continue IV antibiotics through 48h afebrile (this evening)  -Renal US-kidneys in upper range of normal size, larger right kidney than left, but no evidence of obstructive uropathy. Prominent left renal cortical thickness noted, no focal renal cortical lesion or other focal abnormality is demonstrated of either kidney    3. FWB-Di/Di Twin Gestation  -S/p ANCE 5/30 and 5/31   -TID monitoring  -PDC US reassuring       4. GHTN  -BP normotensive to mild range since intake  -Asymptomatic  -labs normal    5. Chest pain 6/4: resolved spontaneously. Normal EKG except sinus tachycardia.  proBNP within normal limits. High sensitivity troponin negative    6. Diet  -Regular    7. Activity  -Ad ludmila    8. DVT PPX  -SCDs    Dispo: discharge home this evening if remains afebrile.  Will need to complete 2wk course of antibiotics and then continue suppression for remainder of pregnancy.  D/W patient.      Karen Osorio MD  6/6/2024  08:59 EDT

## 2024-06-06 NOTE — PAYOR COMM NOTE
Karen Garnica (39 y.o. Female)       Date of Birth   1985    Social Security Number       Address   [address] [place] [address]    Home Phone   [phone]    MRN   [id number]       Christian   None    Marital Status                               Admission Date   6/3/24    Admission Type   Elective    Admitting Provider   Karen Osorio MD    Attending Provider   Karen Osorio MD    Department, Room/Bed   [facility] ANTEPARTUM, N330/1          Problem List             Codes Noted - Resolved       Hospital    * (Principal) Pyelonephritis during pregnancy, antepartum ICD-10-CM: O23.00  ICD-9-CM: 646.63, 590.80 2024 - Present    Flank pain ICD-10-CM: R10.9  ICD-9-CM: 789.09 6/3/2024 - Present    Fever ICD-10-CM: R50.9  ICD-9-CM: 780.60 6/3/2024 - Present       Non-Hospital    Gestational hypertension ICD-10-CM: O13.9  ICD-9-CM: 642.30 2024 - Present    AMA - normal cfDNA ICD-10-CM: O09.529  ICD-9-CM: 659.63 2024 - Present    Dichorionic diamniotic twin pregnancy, antepartum ICD-10-CM: O30.049  ICD-9-CM: 651.03, V91.03 2024 - Present    History of maternal cardiomyopathy, currently pregnant ICD-10-CM: O09.899  ICD-9-CM: V23.89 2024 - Present    History of gestational diabetes mellitus (GDM) ICD-10-CM: Z86.32  ICD-9-CM: V12.21 2024 - Present    History of pre-eclampsia in prior pregnancy ICD-10-CM: O09.299  ICD-9-CM: V23.49 2024 - Present    HSIL (high grade squamous intraepithelial lesion) on Pap smear of cervix ICD-10-CM: R87.613  ICD-9-CM: 795.04 2023 - Present        History & Physical        Melvi Shepherd MD at 06/03/24 [address] [place] [address]  Obstetric History and Physical    Chief Complaint   Patient presents with    right flank pain       Subjective    Patient is a 39 y.o. female  currently at 32w6d, who presents with flank pain and fever/chills that started last night.    Her prenatal care is  complicated by  multiple gestation  DC/DA twins.  Her previous obstetric/gynecological history is noted for is non-contributory.    The following portions of the patients history were reviewed and updated as appropriate: current medications, allergies, past medical history, past surgical history, past family history, past social history, and problem list .       Prenatal Information:  Prenatal Results       Initial Prenatal Labs       Test Value Reference Range Date Time    Hemoglobin  12.2 g/dL 12.0 - 15.9 01/30/24 1930       12.3 g/dL 12.0 - 15.9 12/08/23 1739    Hematocrit  34.9 % 34.0 - 46.6 01/30/24 1930       35.5 % 34.0 - 46.6 12/08/23 1739    Platelets  223 10*3/mm3 140 - 450 01/30/24 1930       258 10*3/mm3 140 - 450 12/08/23 1739    Rubella IgG  5.82 index Immune >0.99 12/08/23 1739    Hepatitis B SAg  Non-Reactive  Non-Reactive 12/08/23 1739    Hepatitis C Ab  Non-Reactive  Non-Reactive 12/08/23 1739    RPR        T. Pallidum Ab   Non-Reactive  Non-Reactive 05/30/24 0004    ABO  O   05/30/24 0004    Rh  Positive   05/30/24 0004    Antibody Screen  Negative   12/08/23 1739    HIV  Non-Reactive  Non-Reactive 12/08/23 1739    Urine Culture  No growth   02/13/24 1450       >100,000 CFU/mL Escherichia coli   12/08/23 1739    Gonorrhea  Negative  Negative 12/08/23 1739    Chlamydia  Negative  Negative 12/08/23 1739    TSH  1.859 UIU/mL 0.350 - 5.350 12/01/15 1034    HgB A1c         Varicella IgG        HgB Electrophoresis         Cystic fibrosis                   Fetal testing        Test Value Reference Range Date Time    NIPT        MSAFP        AFP-4                  2nd and 3rd Trimester       Test Value Reference Range Date Time    Hemoglobin (repeated)  10.1 g/dL 12.0 - 15.9 05/30/24 0956       10.1 g/dL 12.0 - 15.9 05/30/24 0004       11.5 g/dL 12.0 - 15.9 05/18/24 1941    Hematocrit (repeated)  29.1 % 34.0 - 46.6 05/30/24 0956       29.7 % 34.0 - 46.6 05/30/24 0004       32.7 % 34.0 - 46.6 05/18/24 1941     Platelets   214 10*3/mm3 140 - 450 05/30/24 0956       221 10*3/mm3 140 - 450 05/30/24 0004       182 10*3/mm3 140 - 450 05/18/24 1941       223 10*3/mm3 140 - 450 01/30/24 1930       258 10*3/mm3 140 - 450 12/08/23 1739    GCT        Antibody Screen (repeated)  Negative   05/30/24 0004    3rd TM syphilis scrn (repeated)  RPR         3rd TM syphilis scrn (repeated) FTA        GTT Fasting        GTT 1 Hr        GTT 2 Hr        GTT 3 Hr        Group B Strep                  Other testing        Test Value Reference Range Date Time    Parvo IgG         CMV IgG                   Drug Screening       Test Value Reference Range Date Time    Amphetamine Screen  Negative  Negative 12/08/23 1739    Barbiturate Screen  Negative  Negative 12/08/23 1739    Benzodiazepine Screen  Negative  Negative 12/08/23 1739    Methadone Screen  Negative  Negative 12/08/23 1739    Phencyclidine Screen  Negative  Negative 12/08/23 1739    Opiates Screen  Negative  Negative 12/08/23 1739    THC Screen  Negative  Negative 12/08/23 1739    Cocaine Screen  Negative  Negative 12/08/23 1739    Propoxyphene Screen        Buprenorphine Screen  Negative  Negative 12/08/23 1739    Methamphetamine Screen  Negative  Negative 12/08/23 1739    Oxycodone Screen  Negative  Negative 12/08/23 1739    Tricyclic Antidepressants Screen  Negative  Negative 12/08/23 1739              Legend    ^: Historical                          External Prenatal Results       Pregnancy Outside Results - Transcribed From Office Records - See Scanned Records For Details       Test Value Date Time    ABO  O  05/30/24 0004    Rh  Positive  05/30/24 0004    Antibody Screen  Negative  05/30/24 0004       Negative  12/08/23 1739    Varicella IgG       Rubella  5.82 index 12/08/23 1739    Hgb  10.1 g/dL 05/30/24 0956       10.1 g/dL 05/30/24 0004       11.5 g/dL 05/18/24 1941       12.2 g/dL 01/30/24 1930       12.3 g/dL 12/08/23 1739    Hct  29.1 % 05/30/24 0956       29.7 %  24 0004       32.7 % 24 1941       34.9 % 24 1930       35.5 % 23 1739    Glucose Fasting GTT       Glucose Tolerance Test 1 hour       Glucose Tolerance Test 3 hour       Gonorrhea (discrete)  Negative  23 1739    Chlamydia (discrete)  Negative  23 1739    RPR       VDRL       Syphilis Antibody       HBsAg  Non-Reactive  23 1739    Herpes Simplex Virus PCR       Herpes Simplex VIrus Culture       HIV  Non-Reactive  23 1739    Hep C RNA Quant PCR       Hep C Antibody  Non-Reactive  23 1739    AFP       Group B Strep       GBS Susceptibility to Clindamycin       GBS Susceptibility to Erythromycin       Fetal Fibronectin       Genetic Testing, Maternal Blood                 Drug Screening       Test Value Date Time    NIPT        Urine Drug Screen       Amphetamine Screen  Negative  23 1739    Barbiturate Screen  Negative  23 1739    Benzodiazepine Screen  Negative  23 1739    Methadone Screen  Negative  23 1739    Phencyclidine Screen  Negative  23 1739    Opiates Screen  Negative  23 1739    THC Screen  Negative  23 1739    Cocaine Screen       Propoxyphene Screen       Buprenorphine Screen  Negative  23 1739    Methamphetamine Screen       Oxycodone Screen  Negative  23 1739              Legend    ^: Historical                             Past OB History:     OB History    Para Term  AB Living   7 4 4 0 2 4   SAB IAB Ectopic Molar Multiple Live Births   2 0 0 0 0 4      # Outcome Date GA Lbr Noah/2nd Weight Sex Type Anes PTL Lv   7 Current            6 Term 23 38w4d 15:53 / 00:17 3790 g (8 lb 5.7 oz) M Vag-Spont EPI N JONN      Name: Chapito      Apgar1: 8  Apgar5: 9   5 Term 16 37w4d  3260 g (7 lb 3 oz) M Vag-Spont EPI  JONN      Complications: Pregnancy-induced hypertension      Name: Chente   4 Term 04/04/15 38w2d  3459 g (7 lb 10 oz) F Vag-Spont EPI  JONN      Complications:  Pregnancy-induced hypertension, Preeclampsia in postpartum period, Pulmonary edema      Name: Carrington   3 SAB 2014 6w0d          2 SAB 2013           1 Term 07/08/10 39w0d  2977 g (6 lb 9 oz) M Vag-Spont EPI  JONN      Name: Mayelin      Obstetric Comments   2010 - Mayelin (FOB #1)   2015 - Carrington (FOB #2)   2016 - Chente (FOB #3)   2023 - Chapito (FOB #3)   Current  (FOB #3)       Past Medical History: Past Medical History:   Diagnosis Date    History of gestational diabetes - with 4th pregnancy 2023    Hx of postpatum cardiomyopathy 2023    LGSIL on Pap smear of cervix 07/12/2023    Tx wtih LEEP    Postpartum anxiety 2023    Preeclampsia in postpartum period 02/2015    Along with pulmonary edema      Past Surgical History Past Surgical History:   Procedure Laterality Date    LEEP  09/2023    Path = FELICIA 2 with clear margins      Family History: Family History   Problem Relation Age of Onset    Hypertension Mother     Diabetes type II Father     Hypertension Father     Other Son         bifid uvula and polydactyly    Breast cancer Neg Hx     Colon cancer Neg Hx     Ovarian cancer Neg Hx       Social History:  reports that she has never smoked. She has never been exposed to tobacco smoke. She has never used smokeless tobacco.   reports that she does not currently use alcohol after a past usage of about 2.0 standard drinks of alcohol per week.   reports no history of drug use.        Review of Systems      Objective    Vital Signs Range for the last 24 hours  Temperature: Temp:  [100.1 °F (37.8 °C)] 100.1 °F (37.8 °C)   Temp Source: Temp src: Oral   BP: BP: (140)/(75) 140/75   Pulse:     Respirations: Resp:  [18] 18   SPO2:     O2 Amount (l/min):     O2 Devices Device (Oxygen Therapy): room air   Weight:       Physical Examination: General appearance - anxious, ill-appearing, and crying  Chest - unlabored respirations  Heart - no JVD  Abdomen - soft, nontender, nondistended, no masses or organomegaly, gravid  Back exam -  full range of motion, no tenderness, palpable spasm or pain on motion, right sided CVA tenderness  Neurological - alert, oriented, normal speech, no focal findings or movement disorder noted  Musculoskeletal - no joint tenderness, deformity or swelling  Extremities - peripheral pulses normal, no pedal edema, no clubbing or cyanosis  Skin - normal coloration and turgor, no rashes, no suspicious skin lesions noted    Presentation: Not assessed   Cervix: Exam by:     Dilation:     Effacement:     Station:       Fetal Heart Rate Assessment   Method:  external   Beats/min:   180/160   Baseline:     Variability:  moderate   Accels:  present   Decels:  absent   Tracing Category:  Reactive but tachycardic     Uterine Assessment   Method:  irregular   Frequency (min):     Ctx Count in 10 min:     Duration:     Intensity:     Intensity by IUPC:     Resting Tone:     Resting Tone by IUPC:     [place] Units:       Laboratory Results: pending  Radiology Review: none  Other Studies: none    Assessment & Plan      Flank pain    Fever      Assessment & Plan    Assessment:  1.  Intrauterine pregnancy at 32w6d gestation with reactive fetal status.    2.  I am concerned for pyelonephritis. CBC, lactic acid, CMP      Plan:  I transitioned care to Dr. Malin at 2000. He will determine ultimate disposition and plan.      Melvi Shepherd MD  6/3/2024  20:07 EDT      Electronically signed by Melvi Shepherd MD at 06/03/24 2013       Facility-Administered Medications as of 6/5/2024   Medication Dose Route Frequency Provider Last Rate Last Admin    acetaminophen (TYLENOL) tablet 1,000 mg  1,000 mg Oral Q6H Geneva Mcneal, DO   1,000 mg at 06/05/24 2120    [COMPLETED] betamethasone acetate-betamethasone sodium phosphate (CELESTONE SOLUSPAN) injection 12 mg  12 mg Intramuscular Q24H Zak Espitia MD   12 mg at 05/31/24 0214    cefTRIAXone (ROCEPHIN) 2,000 mg in sodium chloride 0.9 % 100 mL MBP  2,000 mg Intravenous Q24H Nestor Malin,   mL/hr at 06/05/24 2232 2,000 mg at 06/05/24 2232    [COMPLETED] diphenhydrAMINE (BENADRYL) capsule 25 mg  25 mg Oral Once Bhavna Pizano MD   25 mg at 06/04/24 2309    docusate sodium (COLACE) capsule 100 mg  100 mg Oral BID Nestor Malin MD   100 mg at 06/05/24 2024    [COMPLETED] lactated ringers bolus 1,000 mL  1,000 mL Intravenous Once Melvi Shepherd MD   Stopped at 06/03/24 2249    oxyCODONE (ROXICODONE) immediate release tablet 10 mg  10 mg Oral Q4H PRN Nestor Malin MD   10 mg at 06/04/24 2117    oxyCODONE (ROXICODONE) immediate release tablet 5 mg  5 mg Oral Q4H PRN High, Nestor MACEDO MD   5 mg at 06/04/24 0034    polyethylene glycol (MIRALAX) packet 17 g  17 g Oral BID Karen Osorio MD   17 g at 06/05/24 2024    [COMPLETED] sodium chloride 0.9 % bolus 1,000 mL  1,000 mL Intravenous Once Elizabet, Geneva C, DO 4,000 mL/hr at 06/04/24 1657 1,000 mL at 06/04/24 1657    sodium chloride 0.9 % infusion  75 mL/hr Intravenous Continuous Crawford, Geneva C, DO   Stopped at 06/05/24 1536     Lab Results (last 72 hours)       Procedure Component Value Units Date/Time    Lactate Dehydrogenase [[id number]]  (Abnormal) Collected: 06/05/24 1203    Specimen: Blood Updated: 06/05/24 1319      U/L     Comprehensive Metabolic Panel [[id number]]  (Abnormal) Collected: 06/05/24 1203    Specimen: Blood Updated: 06/05/24 1319     Glucose 90 mg/dL      BUN 7 mg/dL      Creatinine 0.52 mg/dL      Sodium 137 mmol/L      Potassium 3.7 mmol/L      Comment: Slight hemolysis detected by analyzer. Result may be falsely elevated.        Chloride 110 mmol/L      CO2 19.0 mmol/L      Calcium 8.4 mg/dL      Total Protein 5.8 g/dL      Albumin 2.8 g/dL      ALT (SGPT) 22 U/L      AST (SGOT) 16 U/L      Alkaline Phosphatase 115 U/L      Total Bilirubin 0.3 mg/dL      Globulin 3.0 gm/dL      Comment: Calculated Result        A/G Ratio 0.9 g/dL      BUN/Creatinine Ratio 13.5     Anion Gap 8.0 mmol/L      eGFR 122.1  mL/min/1.73     Narrative:      GFR Normal >60  Chronic Kidney Disease <60  Kidney Failure <15      Urine Culture - Urine, Urine, Clean Catch [[id number]]  (Abnormal) Collected: 06/03/24 2027    Specimen: Urine, Clean Catch Updated: 06/05/24 1259     Urine Culture >100,000 CFU/mL Gram Negative Bacilli    Narrative:      Colonization of the urinary tract without infection is common. Treatment is discouraged unless the patient is symptomatic, pregnant, or undergoing an invasive urologic procedure.    CBC (No Diff) [[id number]]  (Abnormal) Collected: 06/05/24 1203    Specimen: Blood Updated: 06/05/24 1252     WBC 7.03 10*3/mm3      RBC 2.80 10*6/mm3      Hemoglobin 9.1 g/dL      Hematocrit 26.6 %      MCV 95.0 fL      MCH 32.5 pg      MCHC 34.2 g/dL      RDW 13.6 %      RDW-SD 46.0 fl      MPV 10.9 fL      Platelets 162 10*3/mm3     Blood Culture - Blood, Arm, Right [[id number]]  (Normal) Collected: 06/04/24 1013    Specimen: Blood from Arm, Right Updated: 06/05/24 1045     Blood Culture No growth at 24 hours    Narrative:      Less than seven (7) mL's of blood was collected.  Insufficient quantity may yield false negative results.    High Sensitivity Troponin T [[id number]]  (Normal) Collected: 06/04/24 2039    Specimen: Blood Updated: 06/04/24 2101     HS Troponin T 12 ng/L     Narrative:      High Sensitive Troponin T Reference Range:  <14.0 ng/L- Negative Female for AMI  <22.0 ng/L- Negative Male for AMI  >=14 - Abnormal Female indicating possible myocardial injury.  >=22 - Abnormal Male indicating possible myocardial injury.   Clinicians would have to utilize clinical acumen, EKG, Troponin, and serial changes to determine if it is an Acute Myocardial Infarction or myocardial injury due to an underlying chronic condition.         proBNP [[id number]]  (Normal) Collected: 06/04/24 2039    Specimen: Blood Updated: 06/04/24 2101     proBNP 353.7 pg/mL     Narrative:      This assay is used as an aid in the diagnosis of  individuals suspected of having heart failure. It can be used as an aid in the diagnosis of acute decompensated heart failure (ADHF) in patients presenting with signs and symptoms of ADHF to the emergency department (ED). In addition, NT-proBNP of <300 pg/mL indicates ADHF is not likely.    Age Range Result Interpretation  NT-proBNP Concentration (pg/mL:      <50             Positive            >450                   Gray                 300-450                    Negative             <300    50-75           Positive            >900                  Gray                300-900                  Negative            <300      >75             Positive            >1800                  Gray                300-1800                  Negative            <300    CBC (No Diff) [[id number]]  (Abnormal) Collected: 06/04/24 2039    Specimen: Blood Updated: 06/04/24 2056     WBC 12.57 10*3/mm3      RBC 2.66 10*6/mm3      Hemoglobin 8.7 g/dL      Hematocrit 25.4 %      MCV 95.5 fL      MCH 32.7 pg      MCHC 34.3 g/dL      RDW 13.4 %      RDW-SD 46.8 fl      MPV 11.3 fL      Platelets 178 10*3/mm3     Comprehensive Metabolic Panel [[id number]]  (Abnormal) Collected: 06/04/24 1644    Specimen: Blood Updated: 06/04/24 1826     Glucose 103 mg/dL      BUN 7 mg/dL      Creatinine 0.58 mg/dL      Sodium 132 mmol/L      Potassium 3.8 mmol/L      Chloride 97 mmol/L      CO2 19.0 mmol/L      Calcium 8.0 mg/dL      Total Protein 5.9 g/dL      Albumin 3.1 g/dL      ALT (SGPT) 31 U/L      AST (SGOT) 20 U/L      Alkaline Phosphatase 128 U/L      Total Bilirubin 0.4 mg/dL      Globulin 2.8 gm/dL      Comment: Calculated Result        A/G Ratio 1.1 g/dL      BUN/Creatinine Ratio 12.1     Anion Gap 16.0 mmol/L      eGFR 119.0 mL/min/1.73     Narrative:      GFR Normal >60  Chronic Kidney Disease <60  Kidney Failure <15      Respiratory Panel PCR w/COVID-19(SARS-CoV-2) FINN/BALJIT/KIANA/PAD/COR/CLYDE In-House, NP Swab in UTM/VTM, 2 HR TAT - Swab, Nasopharynx  [[id number]]  (Normal) Collected: 06/04/24 1709    Specimen: Swab from Nasopharynx Updated: 06/04/24 1812     ADENOVIRUS, PCR Not Detected     Coronavirus 229E Not Detected     Coronavirus HKU1 Not Detected     Coronavirus NL63 Not Detected     Coronavirus OC43 Not Detected     COVID19 Not Detected     Human Metapneumovirus Not Detected     Human Rhinovirus/Enterovirus Not Detected     Influenza A PCR Not Detected     Influenza B PCR Not Detected     Parainfluenza Virus 1 Not Detected     Parainfluenza Virus 2 Not Detected     Parainfluenza Virus 3 Not Detected     Parainfluenza Virus 4 Not Detected     RSV, PCR Not Detected     Bordetella pertussis pcr Not Detected     Bordetella parapertussis PCR Not Detected     Chlamydophila pneumoniae PCR Not Detected     Mycoplasma pneumo by PCR Not Detected    Narrative:      In the setting of a positive respiratory panel with a viral infection PLUS a negative procalcitonin without other underlying concern for bacterial infection, consider observing off antibiotics or discontinuation of antibiotics and continue supportive care. If the respiratory panel is positive for atypical bacterial infection (Bordetella pertussis, Chlamydophila pneumoniae, or Mycoplasma pneumoniae), consider antibiotic de-escalation to target atypical bacterial infection.    Procalcitonin [[id number]]  (Abnormal) Collected: 06/04/24 1644    Specimen: Blood Updated: 06/04/24 1723     Procalcitonin 0.39 ng/mL     Narrative:      As a Marker for Sepsis (Non-Neonates):    1. <0.5 ng/mL represents a low risk of severe sepsis and/or septic shock.  2. >2 ng/mL represents a high risk of severe sepsis and/or septic shock.    As a Marker for Lower Respiratory Tract Infections that require antibiotic therapy:    PCT on Admission    Antibiotic Therapy       6-12 Hrs later    >0.5                Strongly Recommended  >0.25 - <0.5        Recommended   0.1 - 0.25          Discouraged              Remeasure/reassess  "PCT  <0.1                Strongly Discouraged     Remeasure/reassess PCT    As 28 day mortality risk marker: \"Change in Procalcitonin Result\" (>80% or <=80%) if Day 0 (or Day 1) and Day 4 values are available. Refer to http://[email or web address]-pct-calculator.com    Change in PCT <=80%  A decrease of PCT levels below or equal to 80% defines a positive change in PCT test result representing a higher risk for 28-day all-cause mortality of patients diagnosed with severe sepsis for septic shock.    Change in PCT >80%  A decrease of PCT levels of more than 80% defines a negative change in PCT result representing a lower risk for 28-day all-cause mortality of patients diagnosed with severe sepsis or septic shock.       Lactic Acid, Plasma [[id number]]  (Normal) Collected: 06/04/24 1644    Specimen: Blood Updated: 06/04/24 1713     Lactate 1.1 mmol/L      Comment: Falsely depressed results may occur on samples drawn from patients receiving N-Acetylcysteine (NAC) or Metamizole.       Comprehensive Metabolic Panel [[id number]]  (Abnormal) Collected: 06/04/24 1302    Specimen: Blood Updated: 06/04/24 1340     Glucose 126 mg/dL      BUN 5 mg/dL      Creatinine 0.49 mg/dL      Sodium 132 mmol/L      Potassium 3.6 mmol/L      Chloride 101 mmol/L      CO2 18.0 mmol/L      Calcium 8.0 mg/dL      Total Protein 6.4 g/dL      Albumin 3.3 g/dL      ALT (SGPT) 36 U/L      AST (SGOT) 24 U/L      Alkaline Phosphatase 136 U/L      Total Bilirubin 0.7 mg/dL      Globulin 3.1 gm/dL      Comment: Calculated Result        A/G Ratio 1.1 g/dL      BUN/Creatinine Ratio 10.2     Anion Gap 13.0 mmol/L      eGFR 123.9 mL/min/1.73     Narrative:      GFR Normal >60  Chronic Kidney Disease <60  Kidney Failure <15      Lactate Dehydrogenase [[id number]]  (Normal) Collected: 06/04/24 1302    Specimen: Blood Updated: 06/04/24 1340      U/L     Uric Acid [[id number]]  (Abnormal) Collected: 06/04/24 1302    Specimen: Blood Updated: 06/04/24 1340     Uric Acid " 5.8 mg/dL      Comment: Falsely depressed results may occur on samples drawn from patients receiving N-Acetylcysteine (NAC) or Metamizole.       CBC & Differential [[id number]]  (Abnormal) Collected: 06/04/24 1302    Specimen: Blood Updated: 06/04/24 1317    Narrative:      The following orders were created for panel order CBC & Differential.  Procedure                               Abnormality         Status                     ---------                               -----------         ------                     CBC Auto Differential[[id number]]        Abnormal            Final result                 Please view results for these tests on the individual orders.    CBC Auto Differential [[id number]]  (Abnormal) Collected: 06/04/24 1302    Specimen: Blood Updated: 06/04/24 1317     WBC 13.73 10*3/mm3      RBC 3.32 10*6/mm3      Hemoglobin 10.8 g/dL      Hematocrit 31.8 %      MCV 95.8 fL      MCH 32.5 pg      MCHC 34.0 g/dL      RDW 13.3 %      RDW-SD 46.5 fl      MPV 11.0 fL      Platelets 190 10*3/mm3      Neutrophil % 83.1 %      Lymphocyte % 11.1 %      Monocyte % 4.7 %      Eosinophil % 0.0 %      Basophil % 0.1 %      Immature Grans % 1.0 %      Neutrophils, Absolute 11.40 10*3/mm3      Lymphocytes, Absolute 1.53 10*3/mm3      Monocytes, Absolute 0.65 10*3/mm3      Eosinophils, Absolute 0.00 10*3/mm3      Basophils, Absolute 0.01 10*3/mm3      Immature Grans, Absolute 0.14 10*3/mm3      nRBC 0.0 /100 WBC     Comprehensive Metabolic Panel [[id number]]  (Abnormal) Collected: 06/03/24 2040    Specimen: Blood Updated: 06/03/24 2110     Glucose 106 mg/dL      BUN 9 mg/dL      Creatinine 0.48 mg/dL      Sodium 132 mmol/L      Potassium 3.6 mmol/L      Chloride 97 mmol/L      CO2 18.0 mmol/L      Calcium 7.9 mg/dL      Total Protein 6.4 g/dL      Albumin 3.1 g/dL      ALT (SGPT) 35 U/L      AST (SGOT) 25 U/L      Alkaline Phosphatase 119 U/L      Total Bilirubin 0.4 mg/dL      Globulin 3.3 gm/dL      Comment: Calculated  Result        A/G Ratio 0.9 g/dL      BUN/Creatinine Ratio 18.8     Anion Gap 17.0 mmol/L      eGFR 124.5 mL/min/1.73     Narrative:      GFR Normal >60  Chronic Kidney Disease <60  Kidney Failure <15      Lactic Acid, Plasma [[id number]]  (Normal) Collected: 06/03/24 2040    Specimen: Blood Updated: 06/03/24 2105     Lactate 0.9 mmol/L      Comment: Falsely depressed results may occur on samples drawn from patients receiving N-Acetylcysteine (NAC) or Metamizole.       CBC & Differential [[id number]]  (Abnormal) Collected: 06/03/24 2040    Specimen: Blood Updated: 06/03/24 2058    Narrative:      The following orders were created for panel order CBC & Differential.  Procedure                               Abnormality         Status                     ---------                               -----------         ------                     CBC Auto Differential[[id number]]        Abnormal            Final result                 Please view results for these tests on the individual orders.    CBC Auto Differential [[id number]]  (Abnormal) Collected: 06/03/24 2040    Specimen: Blood Updated: 06/03/24 2058     WBC 12.62 10*3/mm3      RBC 3.13 10*6/mm3      Hemoglobin 10.3 g/dL      Hematocrit 29.2 %      MCV 93.3 fL      MCH 32.9 pg      MCHC 35.3 g/dL      RDW 13.2 %      RDW-SD 43.8 fl      MPV 11.5 fL      Platelets 188 10*3/mm3      Neutrophil % 82.1 %      Lymphocyte % 11.3 %      Monocyte % 5.5 %      Eosinophil % 0.0 %      Basophil % 0.1 %      Immature Grans % 1.0 %      Neutrophils, Absolute 10.36 10*3/mm3      Lymphocytes, Absolute 1.43 10*3/mm3      Monocytes, Absolute 0.70 10*3/mm3      Eosinophils, Absolute 0.00 10*3/mm3      Basophils, Absolute 0.01 10*3/mm3      Immature Grans, Absolute 0.12 10*3/mm3      nRBC 0.2 /100 WBC     Urinalysis, Microscopic Only - Urine, Clean Catch [[id number]]  (Abnormal) Collected: 06/03/24 2027    Specimen: Urine, Clean Catch Updated: 06/03/24 2045     RBC, UA 3-5 /HPF      WBC,  UA Too Numerous to Count /HPF      Bacteria, UA 4+ /HPF      Squamous Epithelial Cells, UA 0-2 /HPF      Hyaline Casts, UA None Seen /LPF      Methodology Automated Microscopy    Urinalysis With Microscopic If Indicated (No Culture) - Urine, Clean Catch [[id number]]  (Abnormal) Collected: 24    Specimen: Urine, Clean Catch Updated: 24     Color, UA Dark Yellow     Appearance, UA Cloudy     pH, UA 5.5     Specific Gravity, UA 1.008     Glucose, UA Negative     Ketones, UA 15 mg/dL (1+)     Bilirubin, UA Negative     Blood, UA Small (1+)     Protein, UA Trace     Leuk Esterase, UA Moderate (2+)     Nitrite, UA Positive     Urobilinogen, UA 1.0 E.U./dL          Orders (last 72 hrs)        Start     Ordered    24 1800  Fetal Monitoring  3 Times Daily        Comments: TID 1 hour    24 1304    24 1245  polyethylene glycol (MIRALAX) packet 17 g  2 Times Daily         24 1151    24 1230  polyethylene glycol (MIRALAX) packet 17 g  Daily,   Status:  Discontinued         24 1143    24 0700  [facility]  1 Time Imaging         24 1737    24 0600  CBC (No Diff)  Morning Draw         24 1739    24 0600  Comprehensive Metabolic Panel  Morning Draw         24 1739    24 0600  Lactate Dehydrogenase  Morning Draw         24 1739    24 2239  High Sensitivity Troponin T 2Hr  PROCEDURE ONCE,   Status:  Canceled         24 21024 2200  Incentive Spirometry  Every 4 Hours While Awake       24 1849    24 2145  diphenhydrAMINE (BENADRYL) capsule 25 mg  Once         24 2048    24 2100  CBC (No Diff)  Once         24 1739    24 2100  Comprehensive Metabolic Panel  Once         24 1739    24 2100  acetaminophen (TYLENOL) tablet 1,000 mg  Every 6 Hours         24 1856    24 2018  proBNP  STAT         24  ECG 12  Lead Chest Pain  STAT         06/04/24 2013 06/04/24 2013  High Sensitivity Troponin T  STAT         06/04/24 2012 06/04/24 2012  proBNP  Once,   Status:  Canceled         06/04/24 2012 06/04/24 1900  acetaminophen (TYLENOL) tablet 1,000 mg  Every 6 Hours PRN,   Status:  Discontinued         06/04/24 1849    06/04/24 1800  acetaminophen (TYLENOL) tablet 650 mg  Every 4 Hours,   Status:  Discontinued         06/04/24 1618    06/04/24 1745  sodium chloride 0.9 % bolus 1,000 mL  Once         06/04/24 1656    06/04/24 1628  Respiratory Panel PCR w/COVID-19(SARS-CoV-2) FINN/BALJIT/KIANA/PAD/COR/CLYDE In-House, NP Swab in UTM/VTM, 2 HR TAT - Swab, Nasopharynx  Once         06/04/24 1627    06/04/24 1627  Lactic Acid, Plasma  STAT         06/04/24 1627    06/04/24 1627  Procalcitonin  STAT         06/04/24 1627    06/04/24 1627  XR Chest 1 View  1 Time Imaging         06/04/24 1627    06/04/24 1353  Inpatient Admission  Once         06/04/24 1355    06/04/24 1249  Uric Acid  Once         06/04/24 1248    06/04/24 1248  CBC & Differential  Once         06/04/24 1248    06/04/24 1248  Comprehensive Metabolic Panel  Once         06/04/24 1248    06/04/24 1248  Lactate Dehydrogenase  Once         06/04/24 1248    06/04/24 1248  Preeclampsia Panel  Once,   Status:  Canceled         06/04/24 1248    06/04/24 1248  CBC Auto Differential  PROCEDURE ONCE         06/04/24 1248    06/04/24 1248  Uric Acid  PROCEDURE ONCE,   Status:  Canceled         06/04/24 1248    06/04/24 1057  US Renal Bilateral  1 Time Imaging         06/04/24 1056    06/04/24 1044  DIET MESSAGE The patient was requesting several diet coke cans with her lunch tray. THANK YOU!  Once        Comments: The patient was requesting several diet coke cans with her lunch tray. THANK YOU!    06/04/24 1044    06/04/24 0948  Urine Culture - Urine, Urine, Clean Catch  Once        Comments: Please ensure 'Use Existing Specimen' is selected if this order is for urine culture  add-on.  If no button appears, please contact the lab for assistance.      06/04/24 0947    06/04/24 0915  docusate sodium (COLACE) capsule 100 mg  2 Times Daily         06/04/24 0816    06/04/24 0849  Urinalysis With Culture If Indicated - Urine, Clean Catch  STAT,   Status:  Canceled        Comments: Please ensure 'Use Existing Specimen' is selected if this order is for urine culture add-on.  If no button appears, please contact the lab for assistance.      06/04/24 0848    06/04/24 0849  Blood Culture - Blood, Arm, Right  STAT         06/04/24 0849    06/04/24 0846  US Renal Bilateral  1 Time Imaging,   Status:  Canceled         06/04/24 0847    06/04/24 0814  oxyCODONE (ROXICODONE) immediate release tablet 10 mg  Every 4 Hours PRN         06/04/24 0816    06/04/24 0800  Fetal Nonstress Test  Once        Comments: TID for 1 hour    06/04/24 0324    06/04/24 0324  Vital Signs  Per Hospital Policy         06/04/24 0324    06/04/24 0323  Diet: Regular/House; Fluid Consistency: Thin (IDDSI 0)  Diet Effective Now         06/04/24 0324    06/04/24 0323  Activity As Tolerated  Until Discontinued         06/04/24 0324    06/04/24 0020  oxyCODONE (ROXICODONE) immediate release tablet 5 mg  Every 4 Hours PRN         06/04/24 0021    06/03/24 2200  cefTRIAXone (ROCEPHIN) 2,000 mg in sodium chloride 0.9 % 100 mL MBP  Every 24 Hours         06/03/24 2113 06/03/24 2200  sodium chloride 0.9 % infusion  Continuous         06/03/24 2114 06/03/24 2045  lactated ringers bolus 1,000 mL  Once         06/03/24 1950    06/03/24 2044  Urinalysis, Microscopic Only - Urine, Clean Catch  Once         06/03/24 2043 06/03/24 2007  Type & Screen  STAT         06/03/24 2006 06/03/24 2007  Lactic Acid, Plasma  STAT         06/03/24 2006 06/03/24 2006  Urinalysis With Microscopic If Indicated (No Culture) - Urine, Clean Catch  Once         06/03/24 2006 06/03/24 1951  CBC & Differential  STAT         06/03/24 1950    06/03/24  1951  Comprehensive Metabolic Panel  STAT         06/03/24 1950 06/03/24 1951  CBC Auto Differential  PROCEDURE ONCE         06/03/24 1950 06/03/24 1950  acetaminophen (TYLENOL) tablet 650 mg  Every 6 Hours PRN,   Status:  Discontinued         06/03/24 1950    Unscheduled  Oxygen Therapy- Nasal Cannula; Titrate 1-6 LPM Per SpO2; Other; o2 st less than 92  Continuous PRN       06/04/24 1849                     Physician Progress Notes (last 72 hours)        Karen Osorio MD at 06/05/24 0824           [facility]  Antepartum Progress Note    Chief Complaint: Pyelonephritis    Subjective     Patient reports feeling very well.  GFM x 2.  No flank pain.  No fevers, chills, body aches.  No LOF, VB, contractions. No HA, vision changes, RUQ/EG pain.    Objective     Vital Signs Range for the last 24 hours  Temp:  [97.7 °F (36.5 °C)-101.2 °F (38.4 °C)] 97.7 °F (36.5 °C)   BP: (112-152)/(56-90) 118/74   Heart Rate:  [] 88   Resp:  [18-20] 18   SpO2:  [88 %-99 %] 95 %       Device (Oxygen Therapy): room air     Fetal Heart Rate Assessment   Method: Fetal HR Assessment Method: external   Beats/min: Fetal HR (beats/min): 125; 130   Baseline: Fetal HR Baseline: normal range x 2   Varibility: Fetal HR Variability: moderate (amplitude range 6 to 25 bpm) x 2   Accels: Fetal HR Accelerations: greater than/equal to 15 bpm, lasting at least 15 seconds x 2    Decels: Fetal HR Decelerations: absent x 2   Tracing Category:  1 x 2     Uterine Assessment   Method: Method: external tocotransducer   Frequency (min): Contraction Frequency (Minutes): x1   Ctx Count in 10 min: Contractions in 10 Minutes: 1   Duration:     Intensity: Contraction Intensity: no contractions   Intensity by IUPC:     Resting Tone: Uterine Resting Tone: soft by palpation   Resting Tone by IUPC:             Physical Exam:  General: Patient is well appearing and in no acute distress   Heart CVS exam: mild LE edema bilaterally, equal.   Lungs Chest:  unlabored breathing, no audible wheezes   Abdomen Gravid, soft nontender   Extremities   Normal in appearance, ROM intact bilaterally         Laboratory Results    Component      Latest Ref [facility] 6/4/2024   proBNP      0.0 - 450.0 pg/mL 353.7        Component      Latest Ref Rn 6/4/2024   HS Troponin T      <14 ng/L 12        Component      Latest Ref Rn 6/4/2024   WBC      3.40 - 10.80 10*3/mm3 12.57 (H)    RBC      3.77 - 5.28 10*6/mm3 2.66 (L)    Hemoglobin      12.0 - 15.9 g/dL 8.7 (L)    Hematocrit      34.0 - 46.6 % 25.4 (L)    MCV      79.0 - 97.0 fL 95.5    MCH      26.6 - 33.0 pg 32.7    MCHC      31.5 - 35.7 g/dL 34.3    RDW      12.3 - 15.4 % 13.4    RDW-SD      37.0 - 54.0 fl 46.8    MPV      6.0 - 12.0 fL 11.3    Platelets      140 - 450 10*3/mm3 178       Component      Latest Ref [facility] 6/4/2024   Glucose      65 - 99 mg/dL 103 (H)    BUN      6 - 20 mg/dL 7    Creatinine      0.57 - 1.00 mg/dL 0.58    Sodium      136 - 145 mmol/L 132 (L)    Potassium      3.5 - 5.2 mmol/L 3.8    Chloride      98 - 107 mmol/L 97 (L)    CO2      22.0 - 29.0 mmol/L 19.0 (L)    Calcium      8.6 - 10.5 mg/dL 8.0 (L)    Total Protein      6.0 - 8.5 g/dL 5.9 (L)    Albumin      3.5 - 5.2 g/dL 3.1 (L)    ALT (SGPT)      1 - 33 U/L 31    AST (SGOT)      1 - 32 U/L 20    Alkaline Phosphatase      39 - 117 U/L 128 (H)    Total Bilirubin      0.0 - 1.2 mg/dL 0.4    Globulin      gm/dL 2.8    A/G Ratio      g/dL 1.1    BUN/Creatinine Ratio      7.0 - 25.0  12.1    Anion Gap      5.0 - 15.0 mmol/L 16.0 (H)    eGFR      >60.0 mL/min/1.73 119.0           Ultrasound Results:       Impression  =========     Fetal testing is reassuring for both twins.     Amniotic fluid volume is normal.     Umbilical artery S/D ratio is normal.        Recommendation  ===============     Continue in hospital management.      Assessment/Plan  Di/Di Pregnancy @ 33w1d   Pyelonephritis   GHTN   History postpartum cardiomyopathy    AMA     1. Current  management on APU    2. Pyelonephritis   -Temp on intake 100.1 6/3 1947, Tmax 101.2 6/4 1557  -Currently on rocephin every Q 24h  -Continue IV fluid resuscitation  -Tylenol scheduled  -Sepsis protocol workup performed due to elevation in temp with tachycardia. CXR-normal. Resp panel-neg  -Urine and blood cultures in process  -Continue roxicodone PRN  -Will continue IV antibiotics through 48h afebrile  -Renal US-kidneys in upper range of normal size, larger right kidney than left, but no evidence of obstructive uropathy. Prominent left renal cortical thickness noted, no focal renal cortical lesion or other focal abnormality is demonstrated of either kidney    3. FWB-Di/Di Twin Gestation  -S/p ANCE 5/30 and 5/31   -TID monitoring  -PDC US 5/30 wnl.  BPP this AM 8/8       4. GHTN  -BP normotensive to mild range since intake  -Asymptomatic  -labs normal    5. Chest pain: resolved spontaneously. Normal EKG except sinus tachycardia.  proBNP within normal limits. High sensitivity troponin negative    6. Diet  -Regular    7. Activity  -Ad ludmlia    8. DVT PPX  -SCDs    Dispo: Continue care on APU through 48h afebrile. Will follow urine and blood cultures.       Karen Osorio MD  6/5/2024  11:52 EDT    Electronically signed by Karen Osorio MD at 06/05/24 1155       Bhavna Pizano MD at 06/04/24 2046          To bedside for assessment for chest pain. By the time I arrived, the episode resolved and patient feeling well. VS reviewed - mildly tachycardic, O2 95% on RA. EKG performed, normal except for sinus tachycardia per my read - no STEMI. BNP / CBC / trop drawn per Dr Shepherd.     Will f/u labs. Suspect possible chest pain due to tachycardia which is not unexpected in the setting of sepsis. Anxiety may also be contributing.      Bhavna Pizano MD  06/04/24  20:48 EDT    [facility] Laborist      Electronically signed by Bhavna Pizano MD at 06/04/24 2048       Geneva Mcneal DO at 06/04/24 3234            [facility]  Antepartum Progress Note    Chief Complaint: Pyelonephritis    Subjective     Patient reports she is feeling okay at this time. States that she did have increase in pain and felt feverish ~ 1 hour ago but feeling better since taking tylenol. Reports good FM. Denies LOF, VB.     Objective     Vital Signs Range for the last 24 hours  Temp:  [98.4 °F (36.9 °C)-101.2 °F (38.4 °C)] 100.3 °F (37.9 °C)   BP: (111-140)/(59-90) 138/90   Heart Rate:  [] 118   Resp:  [16-20] 20   SpO2:  [97 %-98 %] 98 %       Device (Oxygen Therapy): room air     Fetal Heart Rate Assessment   Method: Fetal HR Assessment Method: external   Beats/min: Fetal HR (beats/min): 145   Baseline: Fetal HR Baseline: normal range   Varibility: Fetal HR Variability: moderate (amplitude range 6 to 25 bpm)   Accels: Fetal HR Accelerations: greater than/equal to 15 bpm, lasting at least 15 seconds   Decels: Fetal HR Decelerations: absent   Tracing Category:       Uterine Assessment   Method: Method: external tocotransducer   Frequency (min): Contraction Frequency (Minutes): x2   Ctx Count in 10 min: Contractions in 10 Minutes: 1   Duration:     Intensity: Contraction Intensity: no contractions   Intensity by IUPC:     Resting Tone: Uterine Resting Tone: soft by palpation   Resting Tone by IUPC:             Physical Exam:  General: Patient is well appearing and in no acute distress   Heart CVS exam: mild LE edema bilaterally, equal.   Lungs Chest: unlabored breathing, no audible wheezes   Abdomen Gravid, soft nontender   Extremities   Normal in appearance, ROM intact bilaterally         Laboratory Results  WBC   Date Value Ref Range Status   06/04/2024 13.73 (H) 3.40 - 10.80 10*3/mm3 Final     RBC   Date Value Ref Range Status   06/04/2024 3.32 (L) 3.77 - 5.28 10*6/mm3 Final     Hemoglobin   Date Value Ref Range Status   06/04/2024 10.8 (L) 12.0 - 15.9 g/dL Final     Hematocrit   Date Value Ref Range Status   06/04/2024 31.8 (L) 34.0 - 46.6 %  Final     MCV   Date Value Ref Range Status   06/04/2024 95.8 79.0 - 97.0 fL Final     MCH   Date Value Ref Range Status   06/04/2024 32.5 26.6 - 33.0 pg Final     MCHC   Date Value Ref Range Status   06/04/2024 34.0 31.5 - 35.7 g/dL Final     RDW   Date Value Ref Range Status   06/04/2024 13.3 12.3 - 15.4 % Final     RDW-SD   Date Value Ref Range Status   06/04/2024 46.5 37.0 - 54.0 fl Final     MPV   Date Value Ref Range Status   06/04/2024 11.0 6.0 - 12.0 fL Final     Platelets   Date Value Ref Range Status   06/04/2024 190 140 - 450 10*3/mm3 Final     Neutrophil %   Date Value Ref Range Status   06/04/2024 83.1 (H) 42.7 - 76.0 % Final     Lymphocyte %   Date Value Ref Range Status   06/04/2024 11.1 (L) 19.6 - 45.3 % Final     Monocyte %   Date Value Ref Range Status   06/04/2024 4.7 (L) 5.0 - 12.0 % Final     Eosinophil %   Date Value Ref Range Status   06/04/2024 0.0 (L) 0.3 - 6.2 % Final     Basophil %   Date Value Ref Range Status   06/04/2024 0.1 0.0 - 1.5 % Final     Immature Grans %   Date Value Ref Range Status   06/04/2024 1.0 (H) 0.0 - 0.5 % Final     Neutrophils, Absolute   Date Value Ref Range Status   06/04/2024 11.40 (H) 1.70 - 7.00 10*3/mm3 Final     Lymphocytes, Absolute   Date Value Ref Range Status   06/04/2024 1.53 0.70 - 3.10 10*3/mm3 Final     Monocytes, Absolute   Date Value Ref Range Status   06/04/2024 0.65 0.10 - 0.90 10*3/mm3 Final     Eosinophils, Absolute   Date Value Ref Range Status   06/04/2024 0.00 0.00 - 0.40 10*3/mm3 Final     Basophils, Absolute   Date Value Ref Range Status   06/04/2024 0.01 0.00 - 0.20 10*3/mm3 Final     Immature Grans, Absolute   Date Value Ref Range Status   06/04/2024 0.14 (H) 0.00 - 0.05 10*3/mm3 Final     nRBC   Date Value Ref Range Status   06/04/2024 0.0 0.0 - 0.2 /100 WBC Final     Lab Results   Component Value Date    GLUCOSE 126 (H) 06/04/2024    BUN 5 (L) 06/04/2024    CREATININE 0.49 (L) 06/04/2024    EGFRRESULT 124.8 05/10/2023    EGFR 123.9  2024    BCR 10.2 2024    K 3.6 2024    CO2 18.0 (L) 2024    CALCIUM 8.0 (L) 2024    ALBUMIN 3.3 (L) 2024    BILITOT 0.7 2024    AST 24 2024    ALT 36 (H) 2024       Ultrasound Results:     [facility]    Result Date: 2024  PAT NAME: KVNG LUNA MED REC#: [id number] BIRTH DA: 1985 PAT GEND: F ACCOUNT#: [id number] PAT TYPE: I EXAM VINCENT: [id number] REF KVNG HER ACCESSION [id number] Comparison Studies ================= The findings of this study are compared to the prior ultrasound study dated 24 Patient Status ============ Inpatient Indication ======== Di/Di twins. New GHTN/preeclampsia. AMA. Hx preeclampsia. Hx cardiomyopathy. Hx LEEP. Obesity BMI 32. Maternal Assessment ================== Height 162 cm Height (ft) 5 ft Height (in) 4 in Weight 86 kg Weight (lb) 190 lb BMI 32.84 kg/m² Method ======= Transabdominal ultrasound examination. View: Adequate view Pregnancy ========= Twin pregnancy. Dichorionic-diamniotic. Number of fetuses: 2 Dating ====== Method of dating: based on stated SUMMER GA by prior assessment 32 w + 2 d SUMMER by prior assessment: 2024 Ultrasound examination on: 2024 GA by U/S based upon: AC, BPD, Femur, HC GA by U/S 31 w + 6 d SUMMER by U/S: 2024 GA by U/S based upon (Fetus 2): AC, BPD, Femur, HC GA by U/S (Fetus 2) 32 w + 5 d SUMMER by U/S (Fetus 2): 2024 Previous dating: based on stated SUMMER, selected on 2024 Agreed SUMMER of previous datin2024 Assigned: based on stated SUMMER, selected on 2024 Assigned GA 32 w + 2 d Assigned SUMMER: 2024 Pregnancy length 280 d Fetal Biometry ============ Standard BPD 75.7 mm 30w 3d        4%        Hadlock .9 mm 35w 2d        96%        Geovanna .6 mm 32w 4d        20%        Hadlock Cerebellum tr 39.0 mm 31w 4d        15%        Hill .7 mm 31w 2d        23%        Hadlock Femur 64.4 mm 33w 2d        64%         Hadlock Humerus 54.9 mm 32w 0d        44%        Geovanna HC / AC 1.08 EFW 1,875 g         41%        Alfredo EFW discordance 5.4 % EFW (lb) 4 lb EFW (oz) 2 oz EFW by: Hadlock (BPD-HC-AC-FL) Extended Cav. septi pel. tr 5.1 mm  5.3 mm CM 5.3 mm         6%        Nicolaides Head / Face / Neck Cephalic index 0.71         <1%        Nicolaides Extremities / Bony Struc FL / BPD 0.85 FL / HC 0.22 FL / AC 0.24 Other Structures  bpm Fetal Biometry ============ Standard BPD 80.6 mm 32w 3d        44%        Hadlock .7 mm 35w 2d        95%        Geovanna .7 mm 33w 5d        52%        Hadlock Cerebellum tr 38.2 mm 31w 1d        9%        Hill .7 mm 32w 4d        59%        Hadlock Femur 61.4 mm 31w 6d        26%        Hadlock Humerus 53.7 mm 31w 2d        26%        Geovanna HC / AC 1.06 EFW 1,982 g         50%        Alfredo EFW discordance 5.4 % EFW (lb) 4 lb EFW (oz) 6 oz EFW by: Hadlock (BPD-HC-AC-FL) Extended Cav. septi pel. tr 4.9 mm  4.9 mm CM 8.6 mm         83%        Nicolaides Head / Face / Neck Cephalic index 0.76         11%        Nicolaides Extremities / Bony Struc FL / BPD 0.76 FL / HC 0.20 FL / AC 0.21 Other Structures  bpm General Evaluation ================ Cardiac activity present.  bpm. Fetal movements present. Presentation cephalic, maternal left. Placenta Placental site: posterior. Amniotic fluid Amount of AF: normal. MVP 4.7 cm. General Evaluation ================ Cardiac activity present.  bpm. Fetal movements present. Presentation cephalic, maternal right. Placenta Placental site: anterior. Amniotic fluid Amount of AF: normal. MVP 4.2 cm. Fetal Anatomy ============ Cranium: Normal Cavum septi pellucidi: Normal Cerebellum: Normal Cisterna magna: Normal Head / Neck Rt lateral ventricle: Normal Lt lateral ventricle: Normal 4-chamber view: Appears normal RVOT view: Appears normal LVOT view: Appears normal Stomach: Appears normal Kidneys: Appears normal  Bladder: Appears normal Gender: female Wants to know gender: yes Fetal Anatomy ============ Cranium: Normal Cavum septi pellucidi: Normal Cerebellum: Normal Cisterna magna: Normal Head / Neck Rt lateral ventricle: Normal Lt lateral ventricle: Normal Profile: Normal 4-chamber view: Appears normal RVOT view: Appears normal LVOT view: Appears normal Stomach: Appears normal Kidneys: Appears normal Bladder: Appears normal Gender: female Wants to know gender: yes Fetal Doppler =========== Arterial Umbilical A PI 1.19         92%        Ranjan Umbilical A RI 0.71         89%        Ranjan Umbilical A PS -53.80 cm/s Umbilical A ED 15.13 cm/s Umbilical A TAmax -33.01 cm/s Umbilical A MD 13.12 cm/s Umbilical A S / D 3.54         89%        Ranjan Umbilical A  bpm Fetal Doppler =========== Arterial Umbilical A PI 1.08         81%        Ranjan Umbilical A RI 0.67         77%        Ranjan Umbilical A PS 36.71 cm/s         6%        Ebbing Umbilical A ED 12.13 cm/s Umbilical A TAmax 22.76 cm/s         4%        Ebbing Umbilical A MD 11.86 cm/s Umbilical A S / D 3.03         70%        Ranjan Umbilical A  bpm Biophysical Profile =============== 2: Fetal breathing movements 2: Gross body movements 2: Fetal tone 2: Amniotic fluid volume 8/8 Biophysical profile score Biophysical Profile =============== 2: Fetal breathing movements 2: Gross body movements 2: Fetal tone 2: Amniotic fluid volume 8/8 Biophysical profile score Impression ========= Size consistent with dates both twins. No fetal anomalies were identified. Amniotic fluid volume is normal for both twins. Umbilical artery S/D ratio is normal for both twins. Recommendation =============== Follow-up as clinically indicated. Coding ====== Description: 76816-26 Follow Up Ultrasound Description: 76816-26-59 Follow up Ultrasound additional fetus Description: 76819-26 BPP without NST Description: 01180-26-12 BPP without NST additional fetus Description: 37814-44  Doppler Umbilical Artery Description: 04714-25-61 Doppler Umbilical Artery add'l gestation Sonographer: Breonna Early [facility] Physician: Doug Milligan, MD, FACOG Electronically signed by: Doug Milligan, MD, FACOG at: 2024/05/30 08:01      Assessment/Plan  Di/Di Pregnancy @ 33w0d   Pyelonephritis   GHTN   History postpartum cardiomyopathy    AMA     1. Current management on APU    2. Pyelonephritis   -Temp on intake 100.1 6/3 1947, Tmax 101.2 6/4 1557  -Currently on rocephin every Q 24h  -Continue IV fluid resuscitation  -Tylenol scheduled  -Sepsis protocol workup performed due to elevation in temp with tachycardia. CXR-normal. Resp panel-in process  -Urine and blood cultures already in process  -Continue roxicodone PRN  -Will continue IV antibiotics through 48h afebrile  -Repeat labs ordered for 2100 and tomorrow am  -Renal US-kidneys in upper range of normal size, larger right kidney than left, but no evidence of obstructive uropathy. Prominent left renal cortical thickness noted, no focal renal cortical lesion or other focal abnormality id demonstrated of either kidney    3. FWB-Di/Di Twin Gestation  -S/p ANCE 5/30 and 5/31   -TID monitoring  -PDC US from 5/30 as listed above  -BPP ordered for tomorrow am    4. GHTN  -BP normotensive since intake  -Asymptomatic  -Repeat labs normal    5. Diet  -Regular    6. Activity  -Ad ludmila    7. DVT PPX  -SCDs    Dispo: Continue care on APU through 48h afebrile. Will follow urine and blood cultures.       Geneva Mcneal DO  6/4/2024  17:35 EDT    Electronically signed by Geneva Mcneal DO at 06/04/24 1908       Geneva Mcneal DO at 06/04/24 1029           [facility]  Antepartum Progress Note    Chief Complaint: Pyelonephritis    Subjective     Patient reports she is doing okay this am. She admits that right flank pain is still intense. Admits to pain that is mid to low back on both side but primarily on the right. She denies fever or chills at this time. Reports she is  feeling good fetal movement of both babies. She does not intermittent contraction pains. Denies leakage of fluid or vaginal bleeding. Denies hematuria or dysuria since admission. She has been tolerating PO intake. Tolerating antibiotics well.     Objective     Vital Signs Range for the last 24 hours  Temp:  [98.4 °F (36.9 °C)-100.1 °F (37.8 °C)] 98.5 °F (36.9 °C)   BP: (111-140)/(59-83) 121/68   Heart Rate:  [] 108   Resp:  [16-18] 16   SpO2:  [97 %-98 %] 98 %       Device (Oxygen Therapy): room air     Fetal Heart Rate Assessment   Method: Fetal HR Assessment Method: external   Beats/min: Fetal HR (beats/min): 145   Baseline: Fetal HR Baseline: normal range   Varibility: Fetal HR Variability: moderate (amplitude range 6 to 25 bpm)   Accels: Fetal HR Accelerations: greater than/equal to 15 bpm, lasting at least 15 seconds   Decels: Fetal HR Decelerations: absent   Tracing Category:       Uterine Assessment   Method: Method: external tocotransducer   Frequency (min): Contraction Frequency (Minutes): x2   Ctx Count in 10 min: Contractions in 10 Minutes: 1   Duration:     Intensity: Contraction Intensity: no contractions   Intensity by IUPC:     Resting Tone: Uterine Resting Tone: soft by palpation   Resting Tone by IUPC:             Physical Exam:  General: Patient is well appearing and in no acute distress   Heart CVS exam: mild LE edema bilaterally, equal.   Lungs Chest: unlabored breathing, no audible wheezes   Abdomen Gravid, soft nontender   Extremities  Back Normal in appearance, ROM intact bilaterally  Tenderness to palpation, CVA tenderness present on right side       Laboratory Results  WBC   Date Value Ref Range Status   06/03/2024 12.62 (H) 3.40 - 10.80 10*3/mm3 Final     RBC   Date Value Ref Range Status   06/03/2024 3.13 (L) 3.77 - 5.28 10*6/mm3 Final     Hemoglobin   Date Value Ref Range Status   06/03/2024 10.3 (L) 12.0 - 15.9 g/dL Final     Hematocrit   Date Value Ref Range Status   06/03/2024  29.2 (L) 34.0 - 46.6 % Final     MCV   Date Value Ref Range Status   06/03/2024 93.3 79.0 - 97.0 fL Final     MCH   Date Value Ref Range Status   06/03/2024 32.9 26.6 - 33.0 pg Final     MCHC   Date Value Ref Range Status   06/03/2024 35.3 31.5 - 35.7 g/dL Final     RDW   Date Value Ref Range Status   06/03/2024 13.2 12.3 - 15.4 % Final     RDW-SD   Date Value Ref Range Status   06/03/2024 43.8 37.0 - 54.0 fl Final     MPV   Date Value Ref Range Status   06/03/2024 11.5 6.0 - 12.0 fL Final     Platelets   Date Value Ref Range Status   06/03/2024 188 140 - 450 10*3/mm3 Final     Neutrophil %   Date Value Ref Range Status   06/03/2024 82.1 (H) 42.7 - 76.0 % Final     Lymphocyte %   Date Value Ref Range Status   06/03/2024 11.3 (L) 19.6 - 45.3 % Final     Monocyte %   Date Value Ref Range Status   06/03/2024 5.5 5.0 - 12.0 % Final     Eosinophil %   Date Value Ref Range Status   06/03/2024 0.0 (L) 0.3 - 6.2 % Final     Basophil %   Date Value Ref Range Status   06/03/2024 0.1 0.0 - 1.5 % Final     Immature Grans %   Date Value Ref Range Status   06/03/2024 1.0 (H) 0.0 - 0.5 % Final     Neutrophils, Absolute   Date Value Ref Range Status   06/03/2024 10.36 (H) 1.70 - 7.00 10*3/mm3 Final     Lymphocytes, Absolute   Date Value Ref Range Status   06/03/2024 1.43 0.70 - 3.10 10*3/mm3 Final     Monocytes, Absolute   Date Value Ref Range Status   06/03/2024 0.70 0.10 - 0.90 10*3/mm3 Final     Eosinophils, Absolute   Date Value Ref Range Status   06/03/2024 0.00 0.00 - 0.40 10*3/mm3 Final     Basophils, Absolute   Date Value Ref Range Status   06/03/2024 0.01 0.00 - 0.20 10*3/mm3 Final     Immature Grans, Absolute   Date Value Ref Range Status   06/03/2024 0.12 (H) 0.00 - 0.05 10*3/mm3 Final     nRBC   Date Value Ref Range Status   06/03/2024 0.2 0.0 - 0.2 /100 WBC Final     Lab Results   Component Value Date    GLUCOSE 106 (H) 06/03/2024    BUN 9 06/03/2024    CREATININE 0.48 (L) 06/03/2024    EGFRRESULT 124.8 05/10/2023     EGFR 124.5 2024    BCR 18.8 2024    K 3.6 2024    CO2 18.0 (L) 2024    CALCIUM 7.9 (L) 2024    ALBUMIN 3.1 (L) 2024    BILITOT 0.4 2024    AST 25 2024    ALT 35 (H) 2024       Ultrasound Results:     [facility]    Result Date: 2024  PAT NAME: KVNG LUNA MED REC#: [id number] BIRTH DA: [id number] PAT GEND: F ACCOUNT#: [id number] PAT TYPE: I EXAM VINCENT: [id number] REF KVNG HER ACCESSION [id number] Comparison Studies ================= The findings of this study are compared to the prior ultrasound study dated 24 Patient Status ============ Inpatient Indication ======== Di/Di twins. New GHTN/preeclampsia. AMA. Hx preeclampsia. Hx cardiomyopathy. Hx LEEP. Obesity BMI 32. Maternal Assessment ================== Height 162 cm Height (ft) 5 ft Height (in) 4 in Weight 86 kg Weight (lb) 190 lb BMI 32.84 kg/m² Method ======= Transabdominal ultrasound examination. View: Adequate view Pregnancy ========= Twin pregnancy. Dichorionic-diamniotic. Number of fetuses: 2 Dating ====== Method of dating: based on stated SUMMER GA by prior assessment 32 w + 2 d SUMMER by prior assessment: 2024 Ultrasound examination on: 2024 GA by U/S based upon: AC, BPD, Femur, HC GA by U/S 31 w + 6 d SUMMER by U/S: 2024 GA by U/S based upon (Fetus 2): AC, BPD, Femur, HC GA by U/S (Fetus 2) 32 w + 5 d SUMMER by U/S (Fetus 2): 2024 Previous dating: based on stated SUMMER, selected on 2024 Agreed SUMMER of previous datin2024 Assigned: based on stated SUMMER, selected on 2024 Assigned GA 32 w + 2 d Assigned SUMMER: 2024 Pregnancy length 280 d Fetal Biometry ============ Standard BPD 75.7 mm 30w 3d        4%        Hadlock .9 mm 35w 2d        96%        Geovanna .6 mm 32w 4d        20%        Hadlock Cerebellum tr 39.0 mm 31w 4d        15%        Hill .7 mm 31w 2d        23%        Hadlock Femur 64.4 mm 33w 2d        64%         Hadlock Humerus 54.9 mm 32w 0d        44%        Geovanna HC / AC 1.08 EFW 1,875 g         41%        Alfredo EFW discordance 5.4 % EFW (lb) 4 lb EFW (oz) 2 oz EFW by: Hadlock (BPD-HC-AC-FL) Extended Cav. septi pel. tr 5.1 mm  5.3 mm CM 5.3 mm         6%        Nicolaides Head / Face / Neck Cephalic index 0.71         <1%        Nicolaides Extremities / Bony Struc FL / BPD 0.85 FL / HC 0.22 FL / AC 0.24 Other Structures  bpm Fetal Biometry ============ Standard BPD 80.6 mm 32w 3d        44%        Hadlock .7 mm 35w 2d        95%        Geovanna .7 mm 33w 5d        52%        Hadlock Cerebellum tr 38.2 mm 31w 1d        9%        Hill .7 mm 32w 4d        59%        Hadlock Femur 61.4 mm 31w 6d        26%        Hadlock Humerus 53.7 mm 31w 2d        26%        Geovanna HC / AC 1.06 EFW 1,982 g         50%        Alfredo EFW discordance 5.4 % EFW (lb) 4 lb EFW (oz) 6 oz EFW by: Hadlock (BPD-HC-AC-FL) Extended Cav. septi pel. tr 4.9 mm  4.9 mm CM 8.6 mm         83%        Nicolaides Head / Face / Neck Cephalic index 0.76         11%        Nicolaides Extremities / Bony Struc FL / BPD 0.76 FL / HC 0.20 FL / AC 0.21 Other Structures  bpm General Evaluation ================ Cardiac activity present.  bpm. Fetal movements present. Presentation cephalic, maternal left. Placenta Placental site: posterior. Amniotic fluid Amount of AF: normal. MVP 4.7 cm. General Evaluation ================ Cardiac activity present.  bpm. Fetal movements present. Presentation cephalic, maternal right. Placenta Placental site: anterior. Amniotic fluid Amount of AF: normal. MVP 4.2 cm. Fetal Anatomy ============ Cranium: Normal Cavum septi pellucidi: Normal Cerebellum: Normal Cisterna magna: Normal Head / Neck Rt lateral ventricle: Normal Lt lateral ventricle: Normal 4-chamber view: Appears normal RVOT view: Appears normal LVOT view: Appears normal Stomach: Appears normal Kidneys: Appears normal  Bladder: Appears normal Gender: female Wants to know gender: yes Fetal Anatomy ============ Cranium: Normal Cavum septi pellucidi: Normal Cerebellum: Normal Cisterna magna: Normal Head / Neck Rt lateral ventricle: Normal Lt lateral ventricle: Normal Profile: Normal 4-chamber view: Appears normal RVOT view: Appears normal LVOT view: Appears normal Stomach: Appears normal Kidneys: Appears normal Bladder: Appears normal Gender: female Wants to know gender: yes Fetal Doppler =========== Arterial Umbilical A PI 1.19         92%        Ranjan Umbilical A RI 0.71         89%        Ranjan Umbilical A PS -53.80 cm/s Umbilical A ED 15.13 cm/s Umbilical A TAmax -33.01 cm/s Umbilical A MD 13.12 cm/s Umbilical A S / D 3.54         89%        Ranjan Umbilical A  bpm Fetal Doppler =========== Arterial Umbilical A PI 1.08         81%        Ranjan Umbilical A RI 0.67         77%        Ranjan Umbilical A PS 36.71 cm/s         6%        Ebbing Umbilical A ED 12.13 cm/s Umbilical A TAmax 22.76 cm/s         4%        Ebbing Umbilical A MD 11.86 cm/s Umbilical A S / D 3.03         70%        Ranjan Umbilical A  bpm Biophysical Profile =============== 2: Fetal breathing movements 2: Gross body movements 2: Fetal tone 2: Amniotic fluid volume 8/8 Biophysical profile score Biophysical Profile =============== 2: Fetal breathing movements 2: Gross body movements 2: Fetal tone 2: Amniotic fluid volume 8/8 Biophysical profile score Impression ========= Size consistent with dates both twins. No fetal anomalies were identified. Amniotic fluid volume is normal for both twins. Umbilical artery S/D ratio is normal for both twins. Recommendation =============== Follow-up as clinically indicated. Coding ====== Description: 76816-26 Follow Up Ultrasound Description: 76816-26-59 Follow up Ultrasound additional fetus Description: 76819-26 BPP without NST Description: 39802-11-04 BPP without NST additional fetus Description: 75263-62  "Doppler Umbilical Artery Description: 70692-06-50 Doppler Umbilical Artery add'l gestation Sonographer: [facility] Physician: Doug Milligan, MD, FACOG Electronically signed by: Doug Milligan, MD, FACOG at: 2024/05/30 08:01      Assessment/Plan  Di/Di Pregnancy @ 33w0d   Pyelonephritis   GHTN   History postpartum cardiomyopathy    AMA     1. Current management on APU    2. Pyelonephritis   -Tmax on intake 100.1 6/3 1947  -Currently on rocephin every Q 24h  -Afebrile since admission  -Current management of pain with tylenol and roxicodone PRN  -Will continue IV antibiotics through 48h afebrile  -Mild leukocytosis on intake-repeat labs this am  -Urine sent for culture  -Blood cultures ordered  -Discussed will need PO antibiotics continued through remainder of pregnancy  -Will order renal US bilaterally to r/o presences of stone    3. FWB-Di/Di Twin Gestation  -S/p ANCE 5/30 and 5/31   -TID monitoring  -PDC US from 5/30 as listed above  -Will order BPP for tomorrow am    4. GHTN  -BP normotensive since intake  -Asymptomatic  -Will repeat labs     5. Diet  -Regular    6. Activity  -Ad ludmila    7. DVT PPX  -SCDs    Dispo: Continue care on APU through 48h afebrile. Will follow urine and blood cultures.       Geneva Mcneal DO  6/4/2024  12:14 EDT    Electronically signed by Geneva Mcneal DO at 06/04/24 1225        Discharge Date       Discharge Disposition       Discharge Destination                                 Attending Provider: Karen Osorio MD    Allergies: No Known Allergies    Isolation: None   Infection: None   Code Status: Prior    Ht: 160 cm (63\")   Wt: 87.1 kg (192 lb)    Admission Cmt: None   Principal Problem: Pyelonephritis during pregnancy, antepartum [O23.00]                   Active Insurance as of 6/3/2024       Primary Coverage       Payor Plan Insurance Group Employer/Plan Group    [employer] BLUE SHIELD PPO [id number]       Payor Plan Address Payor Plan " Phone Number Payor Plan Fax Number Effective Dates    [address] [phone]  2021 - None Entered    [facility] [address]         Subscriber Name Subscriber Birth Date Member ID       KVNG LUNA 1985 [id number]               Secondary Coverage       Payor Plan Insurance Group Employer/Plan Group    [place]        Payor Plan Address Payor Plan Phone Number Payor Plan Fax Number Effective Dates    [address]   10/1/2019 - None Entered    [facility] [address]         Subscriber Name Subscriber Birth Date Member ID       KVNG LUNA 1985 [id number]                     Emergency Contacts        (Rel.) Home Phone Work Phone Mobile Phone    Noble Luna (Spouse) [phone] -- [phone]

## 2024-06-07 NOTE — DISCHARGE SUMMARY
[facility]   Discharge Summary      Patient: Karen Garnica      MR#:[id number]  Admission  Diagnosis:   Problems Addressed this Visit    None  Diagnoses    None.     pyelonephritis    Discharge Diagnosis: No diagnosis found.    Date of Admission: 6/3/2024  Date of Discharge:  6/7/2024    Procedures:  none    Service:  Obstetrics    Hospital Course:  Patient admitted w/ R pyelonephritis. She received IV rocephin until she was 48h afebrile. Urine culture returned pan-sensitive E. Coli.  She was discharged home w/ outpatient oral antibiotics. On the day of discharge, she was eating, ambulating and voiding without difficulty.      Labs  Lab Results   Component Value Date    WBC 7.03 06/05/2024    HGB 9.1 (L) 06/05/2024    HCT 26.6 (L) 06/05/2024    MCV 95.0 06/05/2024     06/05/2024    POCGLU 112 06/01/2023    CREATININE 0.52 (L) 06/05/2024    URICACID 5.8 (H) 06/04/2024    AST 16 06/05/2024    ALT 22 06/05/2024     (H) 06/05/2024     Results from last 7 days   Lab Units 06/03/24 2030   ABO TYPING  O   RH TYPING  Positive   ANTIBODY SCREEN  Negative       Discharge Medications     Discharge Medications        New Medications        Instructions Start Date   ampicillin 500 MG capsule  Commonly known as: PRINCIPEN   500 mg, Oral, 4 Times Daily      ampicillin 500 MG capsule  Commonly known as: PRINCIPEN   500 mg, Oral, Nightly      ClearLax 17 GM/SCOOP powder  Generic drug: polyethylene glycol   17 g, Oral, 2 Times Daily PRN      ferrous sulfate 325 (65 FE) MG tablet   325 mg, Oral, Daily With Breakfast             Continue These Medications        Instructions Start Date   magnesium (as) gluconate 500 (27 Mg) MG tablet  Commonly known as: MAGONATE   500 mg, Oral, 2 Times Daily      ondansetron ODT 4 MG disintegrating tablet  Commonly known as: ZOFRAN-ODT   4 mg, Oral, As Needed      Prenatal Vitamin 27-0.8 MG tablet   1 tablet, Oral, Daily             ASK your doctor about these medications         Instructions Start Date   fluconazole 150 MG tablet  Commonly known as: Diflucan  Ask about: Should I take this medication?   150 mg, Oral, Once               Discharge Disposition:  To Home    Discharge Condition:  Stable    Discharge Diet: regular    Activity at Discharge: modified bedrest    Follow-up Appointments  4 days    Karen Osorio MD  06/07/24  09:12 EDT

## 2024-06-07 NOTE — PAYOR COMM NOTE
"Kvng Luna (39 y.o. Female) notice of discharge  [id number]       Date of Birth   1985    Social Security Number       Address   [address] [facility] [address]    Home Phone   [phone]    MRN   [id number]       Jewish   None    Marital Status                               Admission Date   6/3/24    Admission Type   Elective    Admitting Provider   Kvng Osorio MD    Attending Provider       Department, Room/Bed   [facility] ANTEPARTUM, N330/       Discharge Date   2024    Discharge Disposition   Home or Self Care    Discharge Destination                                 Attending Provider: (none)   Allergies: No Known Allergies    Isolation: None   Infection: None   Code Status: Prior    Ht: 160 cm (63\")   Wt: 87.1 kg (192 lb)    Admission Cmt: None   Principal Problem: Pyelonephritis during pregnancy, antepartum [O23.00]                   Active Insurance as of 6/3/2024       Primary Coverage       Payor Plan Insurance Group Employer/Plan Group    [employer] BLUE CROSS [employer] BLUE CROSS BLUE SHIELD PPO [id number]       Payor Plan Address Payor Plan Phone Number Payor Plan Fax Number Effective Dates    [address] [phone]  2021 - None Entered    [facility] [address]         Subscriber Name Subscriber Birth Date Member ID       KVNG LUNA 1985 [id number]               Secondary Coverage       Payor Plan Insurance Group Employer/Plan Group    [place]        Payor Plan Address Payor Plan Phone Number Payor Plan Fax Number Effective Dates    [address]   10/1/2019 - None Entered    [facility] [address]         Subscriber Name Subscriber Birth Date Member ID       KVNG LUNA 1985 [id number]                     Emergency Contacts        (Rel.) Home Phone Work Phone Mobile Phone    DanikaNoble heller (Spouse) [phone] -- [phone]              Insurance Information  "                 [employer]HEM BLUE CROSS/[employer] BLUE CROSS BLUE SHIELD PPO Phone: [phone]    Subscriber: Karen Garnica Subscriber#: [id number]    Group#: [id number] Precert#: [id number]        [place] Phone: --    Subscriber: Karen Garnica Subscriber#: [id number]    Group#: -- Precert#: --          Discharge Summary    No notes of this type exist for this encounter.        Karen Osorio MD   Physician  OB/GYN     Progress Notes     Signed     Date of Service: 24  Creation Time: 24     Signed       Expand All Collapse All    [facility]  Antepartum Progress Note     Chief Complaint: Pyelonephritis        Subjective  Patient reports feeling very well.  She would like to go home tonight if remains afebrile.  GFM x 2.  No flank pain.  No fevers, chills, body aches.  No LOF, VB, contractions. No HA, vision changes, RUQ/EG pain.           Objective[]Expand by Default  Vital Signs Range for the last 24 hours  Temp:  [97.7 °F (36.5 °C)-98.3 °F (36.8 °C)] 97.9 °F (36.6 °C)   BP: (112-136)/(65-82) 129/79   Heart Rate:  [83-96] 93   Resp:  [16] 16   SpO2:  [88 %-99 %] 95 %            Fetal Heart Rate Assessment   Method: Fetal HR Assessment Method: external   Beats/min: Fetal HR (beats/min): 130; 140   Baseline: Fetal HR Baseline: normal range x 2   Varibility: Fetal HR Variability: moderate (amplitude range 6 to 25 bpm) x 2   Accels: Fetal HR Accelerations: greater than/equal to 15 bpm, lasting at least 15 seconds x 2    Decels: Fetal HR Decelerations: absent x 2   Tracing Category:  1 x 2      Uterine Assessment   Method: Method: external tocotransducer   Frequency (min): Contraction Frequency (Minutes): x1   Ctx Count in 10 min: Contractions in 10 Minutes: 1   Duration:    Intensity: Contraction Intensity: no contractions   Intensity by IUPC:    Resting Tone: Uterine Resting Tone: soft by palpation   Resting Tone by IUPC:                Physical  Exam:  General: Patient is well appearing and in no acute distress   Heart CVS exam: mild LE edema bilaterally, equal.   Lungs Chest: unlabored breathing, no audible wheezes   Abdomen Gravid, soft nontender   Extremities  MSK Normal in appearance, ROM intact bilaterally  No CVA tenderness         Laboratory Results  No new           Ultrasound Results:     no new        Assessment/Plan  Di/Di Pregnancy @ 33w2d               Pyelonephritis              GHTN              History postpartum cardiomyopathy                 AMA                1. Current management on APU     2. Pyelonephritis   -Temp on intake 100.1 6/3 1947, Tmax 101.2 6/4 1557  -Currently on rocephin every Q 24h  -Continue IV fluid resuscitation  -Sepsis protocol workup performed due to elevation in temp with tachycardia. CXR-normal. Resp panel-neg  -Urine culture: GNB.  KULWINDER pending  -Will continue IV antibiotics through 48h afebrile (this evening)  -Renal US-kidneys in upper range of normal size, larger right kidney than left, but no evidence of obstructive uropathy. Prominent left renal cortical thickness noted, no focal renal cortical lesion or other focal abnormality is demonstrated of either kidney     3. FWB-Di/Di Twin Gestation  -S/p ANCE 5/30 and 5/31   -TID monitoring  -PDC US reassuring        4. GHTN  -BP normotensive to mild range since intake  -Asymptomatic  -labs normal     5. Chest pain 6/4: resolved spontaneously. Normal EKG except sinus tachycardia.  proBNP within normal limits. High sensitivity troponin negative     6. Diet  -Regular     7. Activity  -Ad ludmila     8. DVT PPX  -SCDs     Dispo: discharge home this evening if remains afebrile.  Will need to complete 2wk course of antibiotics and then continue suppression for remainder of pregnancy.  D/W patient.        Karen Osorio MD  6/6/2024  08:59 EDT

## 2024-06-09 LAB — BACTERIA SPEC AEROBE CULT: NORMAL

## 2024-06-14 ENCOUNTER — OFFICE VISIT (OUTPATIENT)
Dept: CARDIOLOGY | Facility: CLINIC | Age: 39
End: 2024-06-14
Payer: COMMERCIAL

## 2024-06-14 VITALS
SYSTOLIC BLOOD PRESSURE: 110 MMHG | HEIGHT: 63 IN | HEART RATE: 86 BPM | BODY MASS INDEX: 34.55 KG/M2 | DIASTOLIC BLOOD PRESSURE: 70 MMHG | WEIGHT: 195 LBS | OXYGEN SATURATION: 96 %

## 2024-06-14 DIAGNOSIS — I34.0 NONRHEUMATIC MITRAL VALVE REGURGITATION: Primary | ICD-10-CM

## 2024-06-14 PROCEDURE — 99214 OFFICE O/P EST MOD 30 MIN: CPT | Performed by: INTERNAL MEDICINE

## 2024-06-14 RX ORDER — FLUCONAZOLE 150 MG/1
150 TABLET ORAL DAILY
COMMUNITY
Start: 2024-06-07

## 2024-06-14 RX ORDER — NITROFURANTOIN 25; 75 MG/1; MG/1
100 CAPSULE ORAL
COMMUNITY
Start: 2024-06-07

## 2024-06-14 NOTE — PROGRESS NOTES
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[place], [place]      PROBLEM LIST:   Preeclampsia  Postpartum pulmonary edema  proBNP 1,000  Echo 6/6/23: EF 50%, LVH noted, mild-mod MR, RVSP >55 mmHg  Echo 7/31/23: EF 56-60%, grade I LV IR, mild MR, RVSP 28  5/24 Echo: EF 56-60%, mild-moderate MR  G7, P5(delivered 6/2/23) currently pregnant with twins due date July 2024  Postpartum Anemia   Cervical dysplasia(HGSIL)    8/23 LEE - Dr Dias     CC:   Chief Complaint   Patient presents with    Nonrheumatic mitral valve regurgitation       Allergies  No Known Allergies    Current Medications  Current Outpatient Medications   Medication Instructions    ampicillin (PRINCIPEN) 500 mg, Oral, 4 Times Daily    ampicillin (PRINCIPEN) 500 mg, Oral, Nightly    ClearLax 17 g, Oral, 2 Times Daily PRN    ferrous sulfate 325 mg, Oral, Daily With Breakfast    fluconazole (DIFLUCAN) 150 mg, Oral, Daily    magnesium (as) gluconate (MAGONATE) 500 mg, Oral, 2 Times Daily    nitrofurantoin (macrocrystal-monohydrate) (MACROBID) 100 mg, Oral, Havent started yet    ondansetron ODT (ZOFRAN-ODT) 4 mg, Oral, As Needed    Prenatal Vit-Fe Fumarate-FA (Prenatal Vitamin) 27-0.8 MG tablet 1 tablet, Oral, Daily        History of Present Illness   Karen Garnica is a 39 y.o. year old female here for follow up.  She is currently 34 weeks pregnant with twins.  Planned delivery for July 2.  No orthopnea, unusual shortness of breath, or significant lower extremity edema.  She was admitted last week for kidney infection/sepsis.  She is now on ampicillin until she delivers.  Last echocardiogram in May reviewed, mild MR noted.     Pt denies any chest pain, dyspnea, orthopnea, PND,  "palpitations, lower extremity edema, or claudication.      OBJECTIVE:  Vitals:    06/14/24 1126   BP: 110/70   BP Location: Left arm   Patient Position: Sitting   Pulse: 86   SpO2: 96%   Weight: 88.5 kg (195 lb)   Height: 160 cm (63\")     Body mass index is 34.54 kg/m².    Vitals reviewed.   Constitutional:       Appearance: Healthy appearance. Not in distress.      Comments: Pregnant   Pulmonary:      Effort: Pulmonary effort is normal.      Breath sounds: Normal breath sounds.   Cardiovascular:      Normal rate. Regular rhythm. Normal S1. Normal S2.       Murmurs: There is no murmur.   Edema:     Peripheral edema absent.   Skin:     General: Skin is warm and dry.   Neurological:      General: No focal deficit present.      Mental Status: Alert.         Diagnostic Data:  Lab Results   Component Value Date    WBC 7.03 06/05/2024    HGB 9.1 (L) 06/05/2024    HCT 26.6 (L) 06/05/2024    MCV 95.0 06/05/2024     06/05/2024      Lab Results   Component Value Date    GLUCOSE 90 06/05/2024    CALCIUM 8.4 (L) 06/05/2024     06/05/2024    K 3.7 06/05/2024    CO2 19.0 (L) 06/05/2024     (H) 06/05/2024    BUN 7 06/05/2024    CREATININE 0.52 (L) 06/05/2024    EGFRRESULT 124.8 05/10/2023    EGFR 122.1 06/05/2024    BCR 13.5 06/05/2024    ANIONGAP 8.0 06/05/2024      Procedures          ASSESSMENT:   Diagnosis Plan   1. Nonrheumatic mitral valve regurgitation            PLAN:  History of postpartum pulmonary edema  -Echo May 2024 revealed normal EF, normal RVSP, and mild mitral regurgitation.  -She is asymptomatic currently.  -Continue to follow home blood pressures with history of preeclampsia.  -She is not on any medications currently.  -We will be available if needed after her delivery.    Follow-up in 1 year, sooner as needed.  Scribed for Panfilo Mishra MD by Dinorah Bonilla, VJ. 6/14/2024  11:54 EDT     Panfilo Mishra MD, FACC  "

## 2024-06-20 ENCOUNTER — HOSPITAL ENCOUNTER (OUTPATIENT)
Dept: WOMENS IMAGING | Facility: HOSPITAL | Age: 39
Discharge: HOME | End: 2024-06-20
Admitting: OBSTETRICS & GYNECOLOGY
Payer: COMMERCIAL

## 2024-06-20 ENCOUNTER — HOSPITAL ENCOUNTER (INPATIENT)
Facility: HOSPITAL | Age: 39
LOS: 3 days | Discharge: HOME | End: 2024-06-23
Attending: OBSTETRICS & GYNECOLOGY | Admitting: OBSTETRICS & GYNECOLOGY
Payer: COMMERCIAL

## 2024-06-20 ENCOUNTER — OFFICE VISIT (OUTPATIENT)
Dept: OBSTETRICS AND GYNECOLOGY | Facility: HOSPITAL | Age: 39
End: 2024-06-20
Payer: COMMERCIAL

## 2024-06-20 VITALS — SYSTOLIC BLOOD PRESSURE: 139 MMHG | WEIGHT: 199 LBS | BODY MASS INDEX: 35.25 KG/M2 | DIASTOLIC BLOOD PRESSURE: 90 MMHG

## 2024-06-20 DIAGNOSIS — O09.299 HISTORY OF PRE-ECLAMPSIA IN PRIOR PREGNANCY, CURRENTLY PREGNANT: ICD-10-CM

## 2024-06-20 DIAGNOSIS — O09.899 HISTORY OF MATERNAL CARDIOMYOPATHY, CURRENTLY PREGNANT: ICD-10-CM

## 2024-06-20 DIAGNOSIS — O30.049 DICHORIONIC DIAMNIOTIC TWIN PREGNANCY, ANTEPARTUM: Primary | ICD-10-CM

## 2024-06-20 DIAGNOSIS — O30.049 DICHORIONIC DIAMNIOTIC TWIN PREGNANCY, ANTEPARTUM: ICD-10-CM

## 2024-06-20 PROBLEM — O13.3 GESTATIONAL HYPERTENSION, THIRD TRIMESTER: Status: ACTIVE | Noted: 2024-06-20

## 2024-06-20 PROBLEM — O36.5931 IUGR (INTRAUTERINE GROWTH RESTRICTION) AFFECTING CARE OF MOTHER, THIRD TRIMESTER, FETUS 1: Status: ACTIVE | Noted: 2024-06-20

## 2024-06-20 PROBLEM — O30.003 TWIN GESTATION IN THIRD TRIMESTER: Status: ACTIVE | Noted: 2024-06-20

## 2024-06-20 LAB
ABO GROUP BLD: NORMAL
ALP SERPL-CCNC: 156 U/L (ref 39–117)
ALT SERPL W P-5'-P-CCNC: 22 U/L (ref 1–33)
AST SERPL-CCNC: 22 U/L (ref 1–32)
BILIRUB BLD-MCNC: NEGATIVE MG/DL
BILIRUB SERPL-MCNC: 0.4 MG/DL (ref 0–1.2)
BLD GP AB SCN SERPL QL: NEGATIVE
CLARITY, POC: CLEAR
COLOR UR: YELLOW
CREAT SERPL-MCNC: 0.48 MG/DL (ref 0.57–1)
DEPRECATED RDW RBC AUTO: 41.8 FL (ref 37–54)
ERYTHROCYTE [DISTWIDTH] IN BLOOD BY AUTOMATED COUNT: 12.7 % (ref 12.3–15.4)
EXPIRATION DATE: NORMAL
GLUCOSE UR STRIP-MCNC: NEGATIVE MG/DL
HCT VFR BLD AUTO: 32.3 % (ref 34–46.6)
HGB BLD-MCNC: 11 G/DL (ref 12–15.9)
KETONES UR QL: NEGATIVE
LDH SERPL-CCNC: 161 U/L (ref 135–214)
LEUKOCYTE EST, POC: NEGATIVE
Lab: NORMAL
MCH RBC QN AUTO: 31 PG (ref 26.6–33)
MCHC RBC AUTO-ENTMCNC: 34.1 G/DL (ref 31.5–35.7)
MCV RBC AUTO: 91 FL (ref 79–97)
NITRITE UR-MCNC: NEGATIVE MG/ML
PH UR: 6 [PH] (ref 5–8)
PLATELET # BLD AUTO: 233 10*3/MM3 (ref 140–450)
PMV BLD AUTO: 10.9 FL (ref 6–12)
PROT UR STRIP-MCNC: NEGATIVE MG/DL
PROT UR STRIP-MCNC: NEGATIVE MG/DL
RBC # BLD AUTO: 3.55 10*6/MM3 (ref 3.77–5.28)
RBC # UR STRIP: ABNORMAL /UL
RH BLD: POSITIVE
SP GR UR: 1.01 (ref 1–1.03)
T PALLIDUM IGG SER QL: NORMAL
T&S EXPIRATION DATE: NORMAL
URATE SERPL-MCNC: 5.8 MG/DL (ref 2.4–5.7)
UROBILINOGEN UR QL: ABNORMAL
WBC NRBC COR # BLD AUTO: 6.98 10*3/MM3 (ref 3.4–10.8)

## 2024-06-20 PROCEDURE — 86900 BLOOD TYPING SEROLOGIC ABO: CPT | Performed by: OBSTETRICS & GYNECOLOGY

## 2024-06-20 PROCEDURE — 86901 BLOOD TYPING SEROLOGIC RH(D): CPT | Performed by: OBSTETRICS & GYNECOLOGY

## 2024-06-20 PROCEDURE — 81002 URINALYSIS NONAUTO W/O SCOPE: CPT | Performed by: OBSTETRICS & GYNECOLOGY

## 2024-06-20 PROCEDURE — 25810000003 LACTATED RINGERS PER 1000 ML: Performed by: OBSTETRICS & GYNECOLOGY

## 2024-06-20 PROCEDURE — 86850 RBC ANTIBODY SCREEN: CPT | Performed by: OBSTETRICS & GYNECOLOGY

## 2024-06-20 PROCEDURE — 85027 COMPLETE CBC AUTOMATED: CPT | Performed by: OBSTETRICS & GYNECOLOGY

## 2024-06-20 PROCEDURE — 25010000002 PENICILLIN G POTASSIUM PER 600000 UNITS: Performed by: OBSTETRICS & GYNECOLOGY

## 2024-06-20 PROCEDURE — 82565 ASSAY OF CREATININE: CPT | Performed by: OBSTETRICS & GYNECOLOGY

## 2024-06-20 PROCEDURE — 84550 ASSAY OF BLOOD/URIC ACID: CPT | Performed by: OBSTETRICS & GYNECOLOGY

## 2024-06-20 PROCEDURE — 76819 FETAL BIOPHYS PROFIL W/O NST: CPT

## 2024-06-20 PROCEDURE — 83615 LACTATE (LD) (LDH) ENZYME: CPT | Performed by: OBSTETRICS & GYNECOLOGY

## 2024-06-20 PROCEDURE — 84075 ASSAY ALKALINE PHOSPHATASE: CPT | Performed by: OBSTETRICS & GYNECOLOGY

## 2024-06-20 PROCEDURE — 84460 ALANINE AMINO (ALT) (SGPT): CPT | Performed by: OBSTETRICS & GYNECOLOGY

## 2024-06-20 PROCEDURE — 59025 FETAL NON-STRESS TEST: CPT

## 2024-06-20 PROCEDURE — 82247 BILIRUBIN TOTAL: CPT | Performed by: OBSTETRICS & GYNECOLOGY

## 2024-06-20 PROCEDURE — 84450 TRANSFERASE (AST) (SGOT): CPT | Performed by: OBSTETRICS & GYNECOLOGY

## 2024-06-20 PROCEDURE — 59200 INSERT CERVICAL DILATOR: CPT | Performed by: OBSTETRICS & GYNECOLOGY

## 2024-06-20 PROCEDURE — 76820 UMBILICAL ARTERY ECHO: CPT

## 2024-06-20 PROCEDURE — 86780 TREPONEMA PALLIDUM: CPT | Performed by: OBSTETRICS & GYNECOLOGY

## 2024-06-20 PROCEDURE — 76816 OB US FOLLOW-UP PER FETUS: CPT

## 2024-06-20 RX ORDER — ZOLPIDEM TARTRATE 5 MG/1
5 TABLET ORAL ONCE
Status: COMPLETED | OUTPATIENT
Start: 2024-06-20 | End: 2024-06-20

## 2024-06-20 RX ORDER — SODIUM CHLORIDE 0.9 % (FLUSH) 0.9 %
10 SYRINGE (ML) INJECTION AS NEEDED
Status: DISCONTINUED | OUTPATIENT
Start: 2024-06-20 | End: 2024-06-21

## 2024-06-20 RX ORDER — ONDANSETRON 4 MG/1
4 TABLET, ORALLY DISINTEGRATING ORAL EVERY 6 HOURS PRN
Status: DISCONTINUED | OUTPATIENT
Start: 2024-06-20 | End: 2024-06-21

## 2024-06-20 RX ORDER — SODIUM CHLORIDE, SODIUM LACTATE, POTASSIUM CHLORIDE, CALCIUM CHLORIDE 600; 310; 30; 20 MG/100ML; MG/100ML; MG/100ML; MG/100ML
125 INJECTION, SOLUTION INTRAVENOUS CONTINUOUS
Status: DISCONTINUED | OUTPATIENT
Start: 2024-06-20 | End: 2024-06-21

## 2024-06-20 RX ORDER — ACETAMINOPHEN 325 MG/1
650 TABLET ORAL EVERY 4 HOURS PRN
Status: DISCONTINUED | OUTPATIENT
Start: 2024-06-20 | End: 2024-06-21

## 2024-06-20 RX ORDER — SODIUM CHLORIDE 9 MG/ML
40 INJECTION, SOLUTION INTRAVENOUS AS NEEDED
Status: DISCONTINUED | OUTPATIENT
Start: 2024-06-20 | End: 2024-06-21

## 2024-06-20 RX ORDER — MISOPROSTOL 200 UG/1
800 TABLET ORAL ONCE AS NEEDED
Status: DISCONTINUED | OUTPATIENT
Start: 2024-06-20 | End: 2024-06-21

## 2024-06-20 RX ORDER — MAGNESIUM CARB/ALUMINUM HYDROX 105-160MG
30 TABLET,CHEWABLE ORAL ONCE
Status: DISCONTINUED | OUTPATIENT
Start: 2024-06-20 | End: 2024-06-21

## 2024-06-20 RX ORDER — ONDANSETRON 2 MG/ML
4 INJECTION INTRAMUSCULAR; INTRAVENOUS EVERY 6 HOURS PRN
Status: DISCONTINUED | OUTPATIENT
Start: 2024-06-20 | End: 2024-06-21

## 2024-06-20 RX ORDER — PENICILLIN G 3000000 [IU]/50ML
3 INJECTION, SOLUTION INTRAVENOUS EVERY 4 HOURS
Status: DISCONTINUED | OUTPATIENT
Start: 2024-06-20 | End: 2024-06-21

## 2024-06-20 RX ORDER — LIDOCAINE HYDROCHLORIDE 10 MG/ML
0.5 INJECTION, SOLUTION EPIDURAL; INFILTRATION; INTRACAUDAL; PERINEURAL ONCE AS NEEDED
Status: DISCONTINUED | OUTPATIENT
Start: 2024-06-20 | End: 2024-06-21

## 2024-06-20 RX ORDER — OXYTOCIN/0.9 % SODIUM CHLORIDE 30/500 ML
250 PLASTIC BAG, INJECTION (ML) INTRAVENOUS CONTINUOUS
Status: ACTIVE | OUTPATIENT
Start: 2024-06-20 | End: 2024-06-20

## 2024-06-20 RX ORDER — SODIUM CHLORIDE 0.9 % (FLUSH) 0.9 %
10 SYRINGE (ML) INJECTION EVERY 12 HOURS SCHEDULED
Status: DISCONTINUED | OUTPATIENT
Start: 2024-06-20 | End: 2024-06-21

## 2024-06-20 RX ORDER — MORPHINE SULFATE 2 MG/ML
2 INJECTION, SOLUTION INTRAMUSCULAR; INTRAVENOUS
Status: DISCONTINUED | OUTPATIENT
Start: 2024-06-20 | End: 2024-06-21

## 2024-06-20 RX ORDER — OXYTOCIN/0.9 % SODIUM CHLORIDE 30/500 ML
2-20 PLASTIC BAG, INJECTION (ML) INTRAVENOUS
Status: DISCONTINUED | OUTPATIENT
Start: 2024-06-20 | End: 2024-06-21

## 2024-06-20 RX ORDER — METHYLERGONOVINE MALEATE 0.2 MG/ML
200 INJECTION INTRAVENOUS ONCE AS NEEDED
Status: DISCONTINUED | OUTPATIENT
Start: 2024-06-20 | End: 2024-06-21

## 2024-06-20 RX ORDER — CARBOPROST TROMETHAMINE 250 UG/ML
250 INJECTION, SOLUTION INTRAMUSCULAR
Status: DISCONTINUED | OUTPATIENT
Start: 2024-06-20 | End: 2024-06-21

## 2024-06-20 RX ORDER — OXYTOCIN/0.9 % SODIUM CHLORIDE 30/500 ML
2-20 PLASTIC BAG, INJECTION (ML) INTRAVENOUS
Status: DISCONTINUED | OUTPATIENT
Start: 2024-06-20 | End: 2024-06-20

## 2024-06-20 RX ORDER — OXYTOCIN/0.9 % SODIUM CHLORIDE 30/500 ML
999 PLASTIC BAG, INJECTION (ML) INTRAVENOUS ONCE
Status: DISCONTINUED | OUTPATIENT
Start: 2024-06-20 | End: 2024-06-21

## 2024-06-20 RX ADMIN — PENICILLIN G 3 MILLION UNITS: 3000000 INJECTION, SOLUTION INTRAVENOUS at 15:39

## 2024-06-20 RX ADMIN — ZOLPIDEM TARTRATE 5 MG: 5 TABLET, COATED ORAL at 22:55

## 2024-06-20 RX ADMIN — SODIUM CHLORIDE, POTASSIUM CHLORIDE, SODIUM LACTATE AND CALCIUM CHLORIDE 125 ML/HR: 600; 310; 30; 20 INJECTION, SOLUTION INTRAVENOUS at 10:45

## 2024-06-20 RX ADMIN — SODIUM CHLORIDE 5 MILLION UNITS: 900 INJECTION INTRAVENOUS at 11:40

## 2024-06-20 RX ADMIN — SODIUM CHLORIDE, POTASSIUM CHLORIDE, SODIUM LACTATE AND CALCIUM CHLORIDE 125 ML/HR: 600; 310; 30; 20 INJECTION, SOLUTION INTRAVENOUS at 16:46

## 2024-06-20 RX ADMIN — Medication 2 MILLI-UNITS/MIN: at 22:20

## 2024-06-20 RX ADMIN — ZOLPIDEM TARTRATE 5 MG: 5 TABLET, COATED ORAL at 22:46

## 2024-06-20 RX ADMIN — PENICILLIN G 3 MILLION UNITS: 3000000 INJECTION, SOLUTION INTRAVENOUS at 21:47

## 2024-06-20 NOTE — LETTER
"2024     Karen Osorio MD  [address] [facility]  [facility] [address]    Patient: Karen Garnica   YOB: 1985   Date of Visit: 2024     Dear Karen Osorio MD:       Thank you for referring Karen Garnica to me for evaluation. Below are the relevant portions of my assessment and plan of care.    If you have questions, please do not hesitate to call me. I look forward to following Karen along with you.         Sincerely,        Douglas A. Milligan, MD        CC: No Recipients    Milligan, Douglas A, MD  [phone]  Sign when Signing Visit  Documentation of the ultrasound findings, images, and interpretations will be available in the patient's Viewpoint report which is located in the imaging tab in chart review.    Maternal/Fetal Medicine Follow Up  Note     Name: Karen Garnica    : 1985     MRN: [id number]     Referring Provider: Karen Osorio MD    Chief Complaint  Di/Di twins, HX preeclampsia    Subjective     History of Present Illness:  Karen Garnica is a 39 y.o.  35w2d who presents today for twins    SUMMER: Estimated Date of Delivery: 24     ROS:   As noted in HPI.     Objective     Vital Signs  /90   Wt 90.3 kg (199 lb)   LMP 10/17/2023   Estimated body mass index is 35.25 kg/m² as calculated from the following:    Height as of 24: 160 cm (63\").    Weight as of this encounter: 90.3 kg (199 lb).    Physical Exam    Ultrasound Impression:   See Viewpoint    Assessment and Plan     Karen Garnica is a 39 y.o.  35w2d who presents today for twins    Diagnoses and all orders for this visit:    1. Dichorionic diamniotic twin pregnancy, antepartum (Primary)  Assessment & Plan:  Patient returns today for follow-up for diamniotic dichorionic twins.  On her last scan in our office approximately 3 weeks ago she had normally grown twins with normal abdominal circumference's.  Patient reports no complications since we last saw her " and notes good fetal movement.    Ultrasound today demonstrates 2 active fetuses.  Twin A is adequately grown at the 20th percentile but with new abdominal circumference lag at the 1st percentile.  Twin B appears normally grown.  Both twins have normal amniotic fluid.  Both twins however, have elevated umbilical artery Dopplers.  Additionally maternal blood pressure was 154/88 in our office today.    In this patient with a history of preeclampsia and postpartum cardiomyopathy new onset gestational hypertension and rapidly developing growth restriction in twin A, especially combined with elevated Dopplers in both twins leads to a recommendation of delivery at 35 weeks.  Patient was sent to labor and delivery and Dr. Osorio was notified of the findings and recommendations.      2. History of pre-eclampsia in prior pregnancy         Follow Up  No follow-ups on file.    I spent 25 minutes caring for the patient on the day of service. This included: obtaining or reviewing a separately obtained medical history, reviewing patient records, performing a medically appropriate exam and/or evaluation, counseling or educating the patient/family/caregiver, ordering medications, labs, and/or procedures and documenting such in the medical record. This does not include time spent on review and interpretation of other tests such as fetal ultrasound or the performance of other procedures such as amniocentesis or CVS.      Douglas A. Milligan, MD  Maternal Fetal Medicine, [facility]     2024

## 2024-06-20 NOTE — PROGRESS NOTES
"Documentation of the ultrasound findings, images, and interpretations will be available in the patient's Viewpoint report which is located in the imaging tab in chart review.    Maternal/Fetal Medicine Follow Up  Note     Name: Karen Garnica    : 1985     MRN: [id number]     Referring Provider: Karen Osorio MD    Chief Complaint  Di/Di twins, HX preeclampsia    Subjective     History of Present Illness:  Karen Garnica is a 39 y.o.  35w2d who presents today for twins    SUMMER: Estimated Date of Delivery: 24     ROS:   As noted in HPI.     Objective     Vital Signs  /90   Wt 90.3 kg (199 lb)   LMP 10/17/2023   Estimated body mass index is 35.25 kg/m² as calculated from the following:    Height as of 24: 160 cm (63\").    Weight as of this encounter: 90.3 kg (199 lb).    Physical Exam    Ultrasound Impression:   See Viewpoint    Assessment and Plan     Karen Garnica is a 39 y.o.  35w2d who presents today for twins    Diagnoses and all orders for this visit:    1. Dichorionic diamniotic twin pregnancy, antepartum (Primary)  Assessment & Plan:  Patient returns today for follow-up for diamniotic dichorionic twins.  On her last scan in our office approximately 3 weeks ago she had normally grown twins with normal abdominal circumference's.  Patient reports no complications since we last saw her and notes good fetal movement.    Ultrasound today demonstrates 2 active fetuses.  Twin A is adequately grown at the 20th percentile but with new abdominal circumference lag at the 1st percentile.  Twin B appears normally grown.  Both twins have normal amniotic fluid.  Both twins however, have elevated umbilical artery Dopplers.  Additionally maternal blood pressure was 154/88 in our office today.    In this patient with a history of preeclampsia and postpartum cardiomyopathy new onset gestational hypertension and rapidly developing growth restriction in twin A, especially combined " with elevated Dopplers in both twins leads to a recommendation of delivery at 35 weeks.  Patient was sent to labor and delivery and Dr. Osorio was notified of the findings and recommendations.      2. History of pre-eclampsia in prior pregnancy         Follow Up  No follow-ups on file.    I spent 25 minutes caring for the patient on the day of service. This included: obtaining or reviewing a separately obtained medical history, reviewing patient records, performing a medically appropriate exam and/or evaluation, counseling or educating the patient/family/caregiver, ordering medications, labs, and/or procedures and documenting such in the medical record. This does not include time spent on review and interpretation of other tests such as fetal ultrasound or the performance of other procedures such as amniocentesis or CVS.      Douglas A. Milligan, MD  Maternal Fetal Medicine, [facility]     2024

## 2024-06-20 NOTE — ASSESSMENT & PLAN NOTE
Patient returns today for follow-up for diamniotic dichorionic twins.  On her last scan in our office approximately 3 weeks ago she had normally grown twins with normal abdominal circumference's.  Patient reports no complications since we last saw her and notes good fetal movement.    Ultrasound today demonstrates 2 active fetuses.  Twin A is adequately grown at the 20th percentile but with new abdominal circumference lag at the 1st percentile.  Twin B appears normally grown.  Both twins have normal amniotic fluid.  Both twins however, have elevated umbilical artery Dopplers.  Additionally maternal blood pressure was 154/88 in our office today.    In this patient with a history of preeclampsia and postpartum cardiomyopathy new onset gestational hypertension and rapidly developing growth restriction in twin A, especially combined with elevated Dopplers in both twins leads to a recommendation of delivery at 35 weeks.  Patient was sent to labor and delivery and Dr. Osorio was notified of the findings and recommendations.

## 2024-06-20 NOTE — PROGRESS NOTES
"06/20/24  18:16 EDT  Karen Garnica    SUBJECTIVE: tolerating FB w/o complaint.  GFM x 2.  No LOF or VB.  Her  is en route from [place], but will be several more hours.  She would like to wait to start pitocin until he gets close     ASSESSMENTS:     /83   Pulse 82   Temp 98.2 °F (36.8 °C) (Oral)   Resp 18   Ht 160 cm (63\")   Wt 90.3 kg (199 lb)   LMP 10/17/2023   BMI 35.25 kg/m²     Fetal Heart Rate Assessment   Method: Fetal HR Assessment Method: external   Beats/min: Fetal HR (beats/min): 145   Baseline: Fetal HR Baseline: normal range   Varibility: Fetal HR Variability: moderate (amplitude range 6 to 25 bpm)   Accels: Fetal HR Accelerations: greater than/equal to 15 bpm, lasting at least 15 seconds   Decels: Fetal HR Decelerations: absent   Tracing Category:  1     Uterine Assessment   Method: Method: external tocotransducer   Frequency (min): Contraction Frequency (Minutes): 4-5   Ctx Count in 10 min:     Duration:     Intensity: Contraction Intensity: mild by palpation   Intensity by IUPC:     Resting Tone: Uterine Resting Tone: soft by palpation   Resting Tone by IUPC:       Presentation: vtx   Cervix: Exam by: Method: sterile exam per physician   Dilation: Cervical Dilation (cm): 1-2   Effacement: Cervical Effacement: 50%   Station: Fetal Station: -2            IUP at 35w2d   2.   Dichorionic diamniotic twin gestation with IUGR of fetus A and elevated umbilical artery Dopplers of fetus A and B  3.  Gestational hypertension: Recent BPs wnl.  No signs or symptoms of severe disease.  Normal preeclampsia labs.  4.  GBS unknown  5.  History of postpartum cardiomyopathy          PLAN:  FB for cervical ripening  Discussed holding on pitocin until her  is closer to [place].  As long as maternal and fetal status remains reassuring, ok to wait  PCN per protocol for GBS unknown    Karen Osorio MD  18:16 EDT  06/20/24     "

## 2024-06-20 NOTE — H&P
[facility]  Obstetric History and Physical    No chief complaint on file.      Subjective     Patient is a 39 y.o. female  currently at 35w2d with dichorionic diamniotic twin gestation, who presents for induction of labor  for new onset IUGR of fetus A and new onset gestational hypertension.  Fetus a and B had elevated umbilical artery Dopplers.  She was seen by MFM earlier today and recommendation was to proceed with delivery.  She denies headache, vision changes, right upper quadrant pain, epigastric pain, or shortness of breath.    Her prenatal care is complicated by the above.  Her previous obstetric/gynecological history is noted for  preeclampsia and history of postpartum cardiomyopathy following her last delivery .    The following portions of the patients history were reviewed and updated as appropriate: current medications, allergies, past medical history, past surgical history, past family history, past social history, and problem list .       Prenatal Information:   Maternal Prenatal Labs  Blood Type ABO Type   Date Value Ref Range Status   2024 O  Final      Rh Status RH type   Date Value Ref Range Status   2024 Positive  Final      Antibody Screen Antibody Screen   Date Value Ref Range Status   2024 Negative  Final                        Past OB History:       OB History    Para Term  AB Living   7 4 4 0 2 4   SAB IAB Ectopic Molar Multiple Live Births   2 0 0 0 0 4      # Outcome Date GA Lbr Noah/2nd Weight Sex Type Anes PTL Lv   7 Current            6 Term 23 38w4d 15:53 / 00:17 3790 g (8 lb 5.7 oz) M Vag-Spont EPI N JONN      Name: Chapito      Apgar1: 8  Apgar5: 9   5 Term 16 37w4d  3260 g (7 lb 3 oz) M Vag-Spont EPI  JONN      Complications: Pregnancy-induced hypertension      Name: Chente   4 Term 04/04/15 38w2d  3459 g (7 lb 10 oz) F Vag-Spont EPI  JONN      Complications: Pregnancy-induced hypertension, Preeclampsia in postpartum period,  Pulmonary edema      Name: Carrington   3 SAB 2014 6w0d          2 SAB 2013           1 Term 07/08/10 39w0d  2977 g (6 lb 9 oz) M Vag-Spont EPI  JONN      Name: Mayelin      Obstetric Comments   2010 - Mayelin (FOB #1)   2015 - Carrington (FOB #2)   2016 - Chente (FOB #3)   2023 - Chapito (FOB #3)   Current  (FOB #3)       Past Medical History: Past Medical History:   Diagnosis Date    History of gestational diabetes - with 4th pregnancy 2023    Hx of postpatum cardiomyopathy 2023    LGSIL on Pap smear of cervix 07/12/2023    Tx wtih LEEP    Postpartum anxiety 2023    Preeclampsia in postpartum period 02/2015    Along with pulmonary edema      Past Surgical History Past Surgical History:   Procedure Laterality Date    LEEP  09/2023    Path = FELICIA 2 with clear margins      Family History: Family History   Problem Relation Age of Onset    Hypertension Mother     Diabetes type II Father     Hypertension Father     Other Son         bifid uvula and polydactyly    Breast cancer Neg Hx     Colon cancer Neg Hx     Ovarian cancer Neg Hx       Social History:  reports that she has never smoked. She has never been exposed to tobacco smoke. She has never used smokeless tobacco.   reports that she does not currently use alcohol after a past usage of about 2.0 standard drinks of alcohol per week.   reports no history of drug use.        REVIEW OF SYSTEMS              Reports fetal movement is normal x 2             Denies leakage of amniotic fluid.             Denies vaginal bleeding             She reports No contractions             All other systems reviewed and are negative    Objective       Vital Signs Range for the last 24 hours  Temperature: Temp:  [98 °F (36.7 °C)] 98 °F (36.7 °C)   Temp Source: Temp src: Oral   BP: BP: (134-139)/(86-90) 134/86   Pulse: Heart Rate:  [75] 75   Respirations: Resp:  [16] 16   SPO2:     O2 Amount (l/min):     O2 Devices     Weight: Weight:  [90.3 kg (199 lb)] 90.3 kg (199 lb)       Presentation:  Vertex/transverse   Cervix: Exam by: Method: sterile exam per RN   Dilation: Cervical Dilation (cm): 1-2   Effacement: Cervical Effacement: 50%   Station: Fetal Station: -2        Fetal Heart Rate Assessment   Method: Fetal HR Assessment Method: external   Beats/min: Fetal HR (beats/min): 145   Baseline: Fetal HR Baseline: normal range   Variability: Fetal HR Variability: moderate (amplitude range 6 to 25 bpm)   Accels: Fetal HR Accelerations: greater than/equal to 15 bpm, lasting at least 15 seconds   Decels: Fetal HR Decelerations: absent   Tracing Category:  1     Uterine Assessment   Method: Method: external tocotransducer   Frequency (min): Contraction Frequency (Minutes): 5-7   Ctx Count in 10 min:     Duration:     Intensity: Contraction Intensity: no contractions   Intensity by IUPC:     Resting Tone: Uterine Resting Tone: soft by palpation   Resting Tone by IUPC:     Cielo Units:       Constitutional:  Well developed, well nourished, no acute distress, well-groomed.   Uterus: Soft, nontender. Fundus appropriate for dates.  Neurologic:  Alert & oriented x 3,  no focal deficits noted.   Psychiatric:  Speech and behavior appropriate.   Extremities: no cyanosis, clubbing or edema, no evidence of DVT.        Labs:  Lab Results   Component Value Date    WBC 6.98 06/20/2024    HGB 11.0 (L) 06/20/2024    HCT 32.3 (L) 06/20/2024    MCV 91.0 06/20/2024     06/20/2024    POCGLU 112 06/01/2023    CREATININE 0.48 (L) 06/20/2024    URICACID 5.8 (H) 06/20/2024    AST 22 06/20/2024    ALT 22 06/20/2024     06/20/2024     Results from last 7 days   Lab Units 06/20/24  1053   ABO TYPING  O   RH TYPING  Positive   ANTIBODY SCREEN  Negative           Assessment & Plan       Twin gestation in third trimester        Assessment:  1.  Intrauterine pregnancy at 35w2d weeks gestation with reactive fetal status x 2.    2.   Dichorionic diamniotic twin gestation with IUGR of fetus A and elevated umbilical artery  Dopplers of fetus A and B  3.  Gestational hypertension: Mild range blood pressures.  No signs or symptoms of severe disease.  Normal preeclampsia labs.  4..  GBS status: unknown  5.  History of postpartum cardiomyopathy    Plan:  1.Hdz bulb for cervical ripening.  Close blood pressure monitoring.  Penicillin per protocol for GBS unknown.  Discussed increased risk of emergent  section due to transverse presentation of twin B.  Patient strongly desires trial of labor.  Surgical risks reviewed.  2. Plan of care has been reviewed with patient and questions answered.  3.  Risks, benefits of treatment plan have been discussed.  4.  All questions have been answered.        Karen Osorio MD  2024  13:51 EDT

## 2024-06-20 NOTE — PROGRESS NOTES
39 y.o.  at 35w   OB History          7    Para   4    Term   4       0    AB   2    Living   4         SAB   2    IAB   0    Ectopic   0    Molar   0    Multiple   0    Live Births   4          Obstetric Comments   2010 - Kyden (FOB #1)   - Carrington (FOB #2)  2016 - Chente (FOB #3)   - Chapito (FOB #3)  Current  (FOB #3)            Presents as an induction of labor due to Di Di twin gestation with selective IUGR twin A with gestational hypertension.  Her primary OB requests a Hdz Bulb placement to initiate the induction of labor.    Fetal Heart Rate Assessment   Method: Fetal HR Assessment Method: external   Beats/min: Fetal HR (beats/min): 145   Baseline: Fetal HR Baseline: normal range   Varibility: Fetal HR Variability: moderate (amplitude range 6 to 25 bpm)   Accels: Fetal HR Accelerations: greater than/equal to 15 bpm, lasting at least 15 seconds   Decels: Fetal HR Decelerations: absent   Tracing Category:       TOCO  SVE 1 to 2 cm 70% -2 vertex./Vertex    A Hdz Bulb was placed without difficulties with 60 mL of sterile saline.  The patient tolerated the procedure well.

## 2024-06-20 NOTE — PROGRESS NOTES
Patient denies bleeding, leaking fluid or contractions  NIPT low risk  Patients next follow up with Dr. Osorio is today

## 2024-06-21 ENCOUNTER — ANESTHESIA EVENT (OUTPATIENT)
Dept: LABOR AND DELIVERY | Facility: HOSPITAL | Age: 39
End: 2024-06-21
Payer: COMMERCIAL

## 2024-06-21 ENCOUNTER — ANESTHESIA (OUTPATIENT)
Dept: LABOR AND DELIVERY | Facility: HOSPITAL | Age: 39
End: 2024-06-21
Payer: COMMERCIAL

## 2024-06-21 PROBLEM — O30.003 TWIN GESTATION IN THIRD TRIMESTER: Status: RESOLVED | Noted: 2024-06-20 | Resolved: 2024-06-21

## 2024-06-21 PROBLEM — O36.5931 IUGR (INTRAUTERINE GROWTH RESTRICTION) AFFECTING CARE OF MOTHER, THIRD TRIMESTER, FETUS 1: Status: RESOLVED | Noted: 2024-06-20 | Resolved: 2024-06-21

## 2024-06-21 PROCEDURE — C1755 CATHETER, INTRASPINAL: HCPCS | Performed by: ANESTHESIOLOGY

## 2024-06-21 PROCEDURE — 25010000002 FENTANYL CITRATE (PF) 50 MCG/ML SOLUTION: Performed by: OBSTETRICS & GYNECOLOGY

## 2024-06-21 PROCEDURE — 25010000002 FENTANYL CITRATE (PF) 50 MCG/ML SOLUTION: Performed by: ANESTHESIOLOGY

## 2024-06-21 PROCEDURE — 25010000002 ROPIVACAINE PER 1 MG: Performed by: ANESTHESIOLOGY

## 2024-06-21 PROCEDURE — 3E033VJ INTRODUCTION OF OTHER HORMONE INTO PERIPHERAL VEIN, PERCUTANEOUS APPROACH: ICD-10-PCS | Performed by: OBSTETRICS & GYNECOLOGY

## 2024-06-21 PROCEDURE — 25010000002 PENICILLIN G POTASSIUM PER 600000 UNITS: Performed by: OBSTETRICS & GYNECOLOGY

## 2024-06-21 PROCEDURE — 25010000002 BUPIVACAINE (PF) 0.25 % SOLUTION: Performed by: ANESTHESIOLOGY

## 2024-06-21 PROCEDURE — 51703 INSERT BLADDER CATH COMPLEX: CPT

## 2024-06-21 PROCEDURE — 10907ZC DRAINAGE OF AMNIOTIC FLUID, THERAPEUTIC FROM PRODUCTS OF CONCEPTION, VIA NATURAL OR ARTIFICIAL OPENING: ICD-10-PCS | Performed by: OBSTETRICS & GYNECOLOGY

## 2024-06-21 PROCEDURE — C1755 CATHETER, INTRASPINAL: HCPCS

## 2024-06-21 PROCEDURE — 59025 FETAL NON-STRESS TEST: CPT

## 2024-06-21 PROCEDURE — P9612 CATHETERIZE FOR URINE SPEC: HCPCS

## 2024-06-21 RX ORDER — DOCUSATE SODIUM 100 MG/1
100 CAPSULE, LIQUID FILLED ORAL 2 TIMES DAILY
Status: DISCONTINUED | OUTPATIENT
Start: 2024-06-21 | End: 2024-06-23 | Stop reason: HOSPADM

## 2024-06-21 RX ORDER — CITRIC ACID/SODIUM CITRATE 334-500MG
30 SOLUTION, ORAL ORAL ONCE
Status: DISCONTINUED | OUTPATIENT
Start: 2024-06-21 | End: 2024-06-21 | Stop reason: HOSPADM

## 2024-06-21 RX ORDER — ACETAMINOPHEN 325 MG/1
650 TABLET ORAL EVERY 6 HOURS PRN
Status: DISCONTINUED | OUTPATIENT
Start: 2024-06-21 | End: 2024-06-23 | Stop reason: HOSPADM

## 2024-06-21 RX ORDER — ONDANSETRON 2 MG/ML
4 INJECTION INTRAMUSCULAR; INTRAVENOUS ONCE AS NEEDED
Status: DISCONTINUED | OUTPATIENT
Start: 2024-06-21 | End: 2024-06-21 | Stop reason: HOSPADM

## 2024-06-21 RX ORDER — PRENATAL VIT/IRON FUM/FOLIC AC 27MG-0.8MG
1 TABLET ORAL DAILY
Status: DISCONTINUED | OUTPATIENT
Start: 2024-06-21 | End: 2024-06-23 | Stop reason: HOSPADM

## 2024-06-21 RX ORDER — FERROUS SULFATE 325(65) MG
325 TABLET ORAL
Status: DISCONTINUED | OUTPATIENT
Start: 2024-06-22 | End: 2024-06-22 | Stop reason: SDUPTHER

## 2024-06-21 RX ORDER — HYDROCORTISONE 25 MG/G
1 CREAM TOPICAL AS NEEDED
Status: DISCONTINUED | OUTPATIENT
Start: 2024-06-21 | End: 2024-06-23 | Stop reason: HOSPADM

## 2024-06-21 RX ORDER — ONDANSETRON 2 MG/ML
4 INJECTION INTRAMUSCULAR; INTRAVENOUS EVERY 6 HOURS PRN
Status: DISCONTINUED | OUTPATIENT
Start: 2024-06-21 | End: 2024-06-23 | Stop reason: HOSPADM

## 2024-06-21 RX ORDER — SIMETHICONE 80 MG
80 TABLET,CHEWABLE ORAL 4 TIMES DAILY PRN
Status: DISCONTINUED | OUTPATIENT
Start: 2024-06-21 | End: 2024-06-23 | Stop reason: HOSPADM

## 2024-06-21 RX ORDER — LIDOCAINE HYDROCHLORIDE AND EPINEPHRINE 15; 5 MG/ML; UG/ML
INJECTION, SOLUTION EPIDURAL AS NEEDED
Status: DISCONTINUED | OUTPATIENT
Start: 2024-06-21 | End: 2024-06-21 | Stop reason: SURG

## 2024-06-21 RX ORDER — HYDROCODONE BITARTRATE AND ACETAMINOPHEN 5; 325 MG/1; MG/1
1 TABLET ORAL EVERY 4 HOURS PRN
Status: DISCONTINUED | OUTPATIENT
Start: 2024-06-21 | End: 2024-06-23 | Stop reason: HOSPADM

## 2024-06-21 RX ORDER — FENTANYL CITRATE 50 UG/ML
50 INJECTION, SOLUTION INTRAMUSCULAR; INTRAVENOUS
Status: DISCONTINUED | OUTPATIENT
Start: 2024-06-21 | End: 2024-06-21 | Stop reason: HOSPADM

## 2024-06-21 RX ORDER — METOCLOPRAMIDE HYDROCHLORIDE 5 MG/ML
10 INJECTION INTRAMUSCULAR; INTRAVENOUS ONCE AS NEEDED
Status: DISCONTINUED | OUTPATIENT
Start: 2024-06-21 | End: 2024-06-21 | Stop reason: HOSPADM

## 2024-06-21 RX ORDER — FENTANYL CITRATE 50 UG/ML
INJECTION, SOLUTION INTRAMUSCULAR; INTRAVENOUS AS NEEDED
Status: DISCONTINUED | OUTPATIENT
Start: 2024-06-21 | End: 2024-06-21 | Stop reason: SURG

## 2024-06-21 RX ORDER — BUPIVACAINE HYDROCHLORIDE 2.5 MG/ML
INJECTION, SOLUTION EPIDURAL; INFILTRATION; INTRACAUDAL AS NEEDED
Status: DISCONTINUED | OUTPATIENT
Start: 2024-06-21 | End: 2024-06-21 | Stop reason: SURG

## 2024-06-21 RX ORDER — FAMOTIDINE 10 MG/ML
20 INJECTION, SOLUTION INTRAVENOUS ONCE AS NEEDED
Status: DISCONTINUED | OUTPATIENT
Start: 2024-06-21 | End: 2024-06-21 | Stop reason: HOSPADM

## 2024-06-21 RX ORDER — EPHEDRINE SULFATE 5 MG/ML
10 INJECTION INTRAVENOUS
Status: DISCONTINUED | OUTPATIENT
Start: 2024-06-21 | End: 2024-06-21 | Stop reason: HOSPADM

## 2024-06-21 RX ORDER — ROPIVACAINE HYDROCHLORIDE 2 MG/ML
15 INJECTION, SOLUTION EPIDURAL; INFILTRATION; PERINEURAL CONTINUOUS
Status: DISCONTINUED | OUTPATIENT
Start: 2024-06-21 | End: 2024-06-21

## 2024-06-21 RX ORDER — SODIUM CHLORIDE 0.9 % (FLUSH) 0.9 %
1-10 SYRINGE (ML) INJECTION AS NEEDED
Status: DISCONTINUED | OUTPATIENT
Start: 2024-06-21 | End: 2024-06-23 | Stop reason: HOSPADM

## 2024-06-21 RX ORDER — BISACODYL 10 MG
10 SUPPOSITORY, RECTAL RECTAL DAILY PRN
Status: DISCONTINUED | OUTPATIENT
Start: 2024-06-22 | End: 2024-06-23 | Stop reason: HOSPADM

## 2024-06-21 RX ORDER — POLYETHYLENE GLYCOL 3350 17 G/17G
17 POWDER, FOR SOLUTION ORAL DAILY PRN
Status: DISCONTINUED | OUTPATIENT
Start: 2024-06-21 | End: 2024-06-23 | Stop reason: HOSPADM

## 2024-06-21 RX ORDER — IBUPROFEN 600 MG/1
600 TABLET ORAL EVERY 6 HOURS PRN
Status: DISCONTINUED | OUTPATIENT
Start: 2024-06-21 | End: 2024-06-23 | Stop reason: HOSPADM

## 2024-06-21 RX ORDER — HYDROCODONE BITARTRATE AND ACETAMINOPHEN 10; 325 MG/1; MG/1
1 TABLET ORAL EVERY 4 HOURS PRN
Status: DISCONTINUED | OUTPATIENT
Start: 2024-06-21 | End: 2024-06-23 | Stop reason: HOSPADM

## 2024-06-21 RX ORDER — DIPHENHYDRAMINE HYDROCHLORIDE 50 MG/ML
12.5 INJECTION INTRAMUSCULAR; INTRAVENOUS EVERY 8 HOURS PRN
Status: DISCONTINUED | OUTPATIENT
Start: 2024-06-21 | End: 2024-06-21 | Stop reason: HOSPADM

## 2024-06-21 RX ORDER — ONDANSETRON 4 MG/1
4 TABLET, ORALLY DISINTEGRATING ORAL EVERY 6 HOURS PRN
Status: DISCONTINUED | OUTPATIENT
Start: 2024-06-21 | End: 2024-06-23 | Stop reason: HOSPADM

## 2024-06-21 RX ORDER — OXYTOCIN/0.9 % SODIUM CHLORIDE 30/500 ML
125 PLASTIC BAG, INJECTION (ML) INTRAVENOUS ONCE AS NEEDED
Status: DISCONTINUED | OUTPATIENT
Start: 2024-06-21 | End: 2024-06-23 | Stop reason: HOSPADM

## 2024-06-21 RX ADMIN — DOCUSATE SODIUM 100 MG: 100 CAPSULE, LIQUID FILLED ORAL at 19:55

## 2024-06-21 RX ADMIN — LIDOCAINE HYDROCHLORIDE AND EPINEPHRINE 2 ML: 15; 5 INJECTION, SOLUTION EPIDURAL at 06:17

## 2024-06-21 RX ADMIN — ROPIVACAINE HYDROCHLORIDE 15 ML/HR: 2 INJECTION, SOLUTION EPIDURAL; INFILTRATION at 06:22

## 2024-06-21 RX ADMIN — PENICILLIN G 3 MILLION UNITS: 3000000 INJECTION, SOLUTION INTRAVENOUS at 06:34

## 2024-06-21 RX ADMIN — WITCH HAZEL: 500 SOLUTION RECTAL; TOPICAL at 13:11

## 2024-06-21 RX ADMIN — HYDROCODONE BITARTRATE AND ACETAMINOPHEN 1 TABLET: 10; 325 TABLET ORAL at 17:51

## 2024-06-21 RX ADMIN — HYDROCORTISONE 2.5% 1 APPLICATION: 25 CREAM TOPICAL at 13:11

## 2024-06-21 RX ADMIN — HYDROCODONE BITARTRATE AND ACETAMINOPHEN 1 TABLET: 10; 325 TABLET ORAL at 13:14

## 2024-06-21 RX ADMIN — IBUPROFEN 600 MG: 600 TABLET, FILM COATED ORAL at 19:55

## 2024-06-21 RX ADMIN — BUPIVACAINE HYDROCHLORIDE 12 ML: 2.5 INJECTION, SOLUTION EPIDURAL; INFILTRATION; INTRACAUDAL; PERINEURAL at 06:18

## 2024-06-21 RX ADMIN — FENTANYL CITRATE 100 MCG: 50 INJECTION, SOLUTION INTRAMUSCULAR; INTRAVENOUS at 06:18

## 2024-06-21 RX ADMIN — PENICILLIN G 3 MILLION UNITS: 3000000 INJECTION, SOLUTION INTRAVENOUS at 03:14

## 2024-06-21 RX ADMIN — Medication 1 APPLICATION: at 13:11

## 2024-06-21 RX ADMIN — LIDOCAINE HYDROCHLORIDE AND EPINEPHRINE 3 ML: 15; 5 INJECTION, SOLUTION EPIDURAL at 06:16

## 2024-06-21 RX ADMIN — BENZOCAINE 1 APPLICATION: 5.6 OINTMENT TOPICAL at 13:11

## 2024-06-21 RX ADMIN — FENTANYL CITRATE 50 MCG: 50 INJECTION, SOLUTION INTRAMUSCULAR; INTRAVENOUS at 03:14

## 2024-06-21 RX ADMIN — IBUPROFEN 600 MG: 600 TABLET, FILM COATED ORAL at 13:11

## 2024-06-21 RX ADMIN — Medication: at 13:11

## 2024-06-21 RX ADMIN — FENTANYL CITRATE 50 MCG: 50 INJECTION, SOLUTION INTRAMUSCULAR; INTRAVENOUS at 04:22

## 2024-06-21 NOTE — PROGRESS NOTES
"06/21/24  08:35 EDT  Karen Garnica    SUBJECTIVE: comfortable w/ epidural    ASSESSMENTS:     /62   Pulse 112   Temp 97.8 °F (36.6 °C) (Oral)   Resp 18   Ht 160 cm (63\")   Wt 90.3 kg (199 lb)   LMP 10/17/2023   BMI 35.25 kg/m²     Fetal Heart Rate Assessment   Method: Fetal HR Assessment Method: external   Beats/min: Fetal HR (beats/min): 135; 140   Baseline: Fetal HR Baseline: normal range x 2   Varibility: Fetal HR Variability: moderate (amplitude range 6 to 25 bpm)   Accels: Fetal HR Accelerations: greater than/equal to 15 bpm, lasting at least 15 seconds x 2   Decels: Fetal HR Decelerations: absent; variable   Tracing Category:  1; 2     Uterine Assessment   Method: Method: external tocotransducer   Frequency (min): Contraction Frequency (Minutes): 3-4   Ctx Count in 10 min: Contractions in 10 Minutes: x1   Duration:     Intensity: Contraction Intensity: moderate by palpation   Intensity by IUPC:     Resting Tone: Uterine Resting Tone: soft by palpation   Resting Tone by IUPC:       Presentation: vtx   Cervix: Exam by: Method: sterile exam per physician (aram)   Dilation: Cervical Dilation (cm): 8   Effacement: Cervical Effacement: 100%   Station: Fetal Station: 0            IUP at 35w2d   2.   Dichorionic diamniotic twin gestation with IUGR of fetus A and elevated umbilical artery Dopplers of fetus A and B  3.  Gestational hypertension: Recent BPs wnl.  No signs or symptoms of severe disease.  Normal preeclampsia labs.  4.  GBS unknown  5.  History of postpartum cardiomyopathy          PLAN:  S/p FB for cervical ripening, now pitocin per protocol  PCN per protocol for GBS unknown    Karen Osorio MD  08:35 EDT  06/21/24     "

## 2024-06-21 NOTE — L&D DELIVERY NOTE
[facility]   Twins Vaginal Delivery Note    Patient Name: Karen Garnica  : 1985  MRN: [id number]    Date of Delivery:      Danielito Garnica [[id number]]   2024      Danielito Garnica [[id number]]   2024     Diagnosis     Pre & Post-Delivery:  Intrauterine pregnancy at 35w3d  Labor status:      Danielito Garnica [[id number]]         Danielito Garnica [[id number]]        Gestational hypertension, third trimester     (spontaneous vaginal delivery)             Problem List    Transfer to Postpartum    Review the Delivery Report for details.     Delivery Details     TWIN A  Information for the patient's :  Danielito Garnica [[id number]]       Delivery Method: Vaginal, Spontaneous      YOB: 2023  Time of Birth: 8:53 AM           Forceps?: No  Vaccuum?: No  Shoulder Dystocia present: No  Anesthesia:      TWIN B        Information for the patient's :  Danielito Garnica [[id number]]       Delivery Method: Vaginal, Spontaneous      YOB: 2023  Time of Birth: 9:05 AM           Forceps?: No  Vaccuum?: No  Shoulder Dystocia present: No    Anesthesia:                 Delivery narrative: Uncomplicated  over an intact perineum.  Anterior shoulder delivered with ease.  Body delivered atraumatically.  Infant vigorous at delivery so placed on maternal abdomen.  Delayed cord clamping x 1 min.  Cord doubly clamped and cut.  AROM of B then performed.  She again had an uncomplicated  over an intact perineum.  Anterior shoulder delivered with ease and body delivered atraumatically.   Infant vigorous at delivery so placed on maternal abdomen.  Delayed cord clamping x 1 min.  Cord doubly clamped and cut. Placenta delivered intact with gentle cord traction. Bleeding minimal.    Infant Details     TWIN A  Sex: female     Infant Name: Ute Birth Weight: 2371 g (5 lb 3.6 oz)   Length: Length: 17.50  in   Apgars at 1 minute: 8  Apgars at 5  "minutes: 9      TWIN B  Sex: female     Infant Name: Brenda Birth Weight: 2417 g (5 lb 5.3 oz)   Length: Length: 17.50  in             Placenta, Cord, and Fluid    Placenta delivered:   Twin A-        Twin B-         Cord: Twin A- Cord: 3 vessels   Twin B-     Nuchal Cord? Twin A- Nuchal Cord?: no  Twin B- Nuchal Cord?: No   Cord Blood obtained: Twin A-                         Twin B-     Cord Gases obtained: Twin A-                           No results found for: \"PHCVEN\", \"BECVEN\"   No results found for: \"PHCART\", \"BECART\"   Twin B-      No results found for: \"PHCVEN\", \"BECVEN\"   No results found for: \"PHCART\", \"BECART\"       Repair      Episiotomy:  Not recorded:   Lacerations:  No  Estimated Blood Loss:  100mL          Complications  GHTN, IUGR, elevated umbilical artery dopplers    Disposition  Mother to Mother Baby/Postpartum  in stable condition currently.  Baby A to remains with mom  in stable condition currently.  Baby B to remains with mom  in stable condition currently.        Karen Osorio MD  06/21/24  09:22 EDT  "

## 2024-06-21 NOTE — ANESTHESIA PROCEDURE NOTES
Labor Epidural      Patient reassessed immediately prior to procedure    Patient location during procedure: OB  Performed By  Anesthesiologist: Prince Mac DO  Preanesthetic Checklist  Completed: patient identified, IV checked, site marked, risks and benefits discussed, surgical consent, monitors and equipment checked, pre-op evaluation and timeout performed  Prep:  Pt Position:sitting  Sterile Tech:gloves, mask, sterile barrier and cap  Prep:chlorhexidine gluconate and isopropyl alcohol  Monitoring:blood pressure monitoring and continuous pulse oximetry  Epidural Block Procedure:  Approach:midline  Guidance:landmark technique and palpation technique  Location:L2-L3  Needle Type:Tuohy  Needle Gauge:17 G  Loss of Resistance Medium: air  Loss of Resistance: 5cm  Cath Depth at skin:11 cm  Paresthesia: none  Aspiration:negative  Test Dose:negative  Number of Attempts: 1  Post Assessment:  Dressing:secured with tape and occlusive dressing applied (Tegaderm Placed)  Pt Tolerance:patient tolerated the procedure well with no apparent complications  Complications:no

## 2024-06-22 LAB
HCT VFR BLD AUTO: 28 % (ref 34–46.6)
HGB BLD-MCNC: 9.3 G/DL (ref 12–15.9)

## 2024-06-22 PROCEDURE — 85014 HEMATOCRIT: CPT | Performed by: OBSTETRICS & GYNECOLOGY

## 2024-06-22 PROCEDURE — 85018 HEMOGLOBIN: CPT | Performed by: OBSTETRICS & GYNECOLOGY

## 2024-06-22 RX ORDER — FERROUS SULFATE 325(65) MG
325 TABLET ORAL
Status: DISCONTINUED | OUTPATIENT
Start: 2024-06-23 | End: 2024-06-23 | Stop reason: HOSPADM

## 2024-06-22 RX ADMIN — HYDROCODONE BITARTRATE AND ACETAMINOPHEN 1 TABLET: 10; 325 TABLET ORAL at 01:29

## 2024-06-22 RX ADMIN — DOCUSATE SODIUM 100 MG: 100 CAPSULE, LIQUID FILLED ORAL at 09:12

## 2024-06-22 RX ADMIN — DOCUSATE SODIUM 100 MG: 100 CAPSULE, LIQUID FILLED ORAL at 20:20

## 2024-06-22 RX ADMIN — FERROUS SULFATE TAB 325 MG (65 MG ELEMENTAL FE) 325 MG: 325 (65 FE) TAB at 09:13

## 2024-06-22 RX ADMIN — PRENATAL VITAMINS-IRON FUMARATE 27 MG IRON-FOLIC ACID 0.8 MG TABLET 1 TABLET: at 09:12

## 2024-06-22 RX ADMIN — IBUPROFEN 600 MG: 600 TABLET, FILM COATED ORAL at 05:13

## 2024-06-22 RX ADMIN — IBUPROFEN 600 MG: 600 TABLET, FILM COATED ORAL at 20:19

## 2024-06-22 RX ADMIN — IBUPROFEN 600 MG: 600 TABLET, FILM COATED ORAL at 14:22

## 2024-06-22 NOTE — CASE MANAGEMENT/SOCIAL WORK
"Continued Stay Note  [facility]     Patient Name: Karen Garnica  MRN: [id number]  Today's Date: 6/22/2024    Admit Date: 6/20/2024    Plan: CAROLINA consult--home   Discharge Plan       Row Name 06/22/24 1448       Plan    Plan SW consult--home    Plan Comments MSW met with pt. at bedside. Pt. had an Geismar score of 10. Pt. reports that she is feeling \"fine\".  MSW provided resources and offered support. Pt. reports that she is aware of how she feels and will ask for help if she needs it.  No other needs or concerns at this time. MSW is available.    Final Discharge Disposition Code 01 - home or self-care                   Discharge Codes    No documentation.                 Expected Discharge Date and Time       Expected Discharge Date Expected Discharge Time    Jun 23, 2024               RUDI Prieto    "

## 2024-06-22 NOTE — LACTATION NOTE
24 2219   Maternal Information   Date of Referral 24   Person Making Referral lactation consultant  (newly postpartum, courtesy visit)   Maternal Reason for Referral breastfeeding currently   Infant Reason for Referral 35-37 weeks gestation; infant  (twins)   Maternal Assessment   Breast Size Issue none   Breast Shape Bilateral:;round   Breast Density Bilateral:;soft   Areola Bilateral:;elastic   Nipples Bilateral:;everted   Left Nipple Symptoms intact;nontender   Right Nipple Symptoms intact;nontender   Maternal Infant Feeding   Maternal Emotional State relaxed;receptive;independent   Milk Expression/Equipment   Breast Pump Type double electric, personal;double electric, hospital grade;manual pump  (Motif Rebekah at home,)   Breast Pump Flange Type hard   Breast Pump Flange Size 21 mm   Breast Pumping   Breast Pumping Interventions early pumping promoted;frequent pumping encouraged;post-feed pumping encouraged  (Enc. to pump Q3H to build optimal milk supply)     Reviewed breastfeeding tips and information handouts with Mom including pumping and supplementing and pumping log. Mom reports Baby A has latched and did well, Baby B having more difficult time latching. Babies are both in nursery at this time. Setup hospital pump for Mom. She was able to pump 5ml. Provided wash basin, soap, oral syringes. She reports she breast fed #1-4 anywhere from 4 mo. To 2 years. Planning to try to nurse the twins x 2 yrs. Encouraged to pump Q3H x 15 min as babies are early at 35w 3d. Mom verbalized understanding and agrees with plan. Answered all questions at this time. Encouraged to call lactation with any questions/concerns. Encouraged to call outpatient clinic prn after discharge.

## 2024-06-22 NOTE — LACTATION NOTE
Mom is pumping q 3 hours for her twin infants. Obtained ~ 3 mls EBM with last pump.  Reports lactation is going well and has no needs at this time.    1. The severity of signs/symptoms.(See ED/admit documents)

## 2024-06-22 NOTE — PROGRESS NOTES
[facility]  Obstetric Progress Note    Chief Complaint: PPD 1    Subjective     Feeling well. Pain controlled. madi diet. +amb/void. Lochia appr  Objective     Vital Signs Range for the last 24 hours  Temp:  [97.8 °F (36.6 °C)-99 °F (37.2 °C)] 98.4 °F (36.9 °C)   BP: (132-144)/(75-89) 137/89   Heart Rate:  [] 85   Resp:  [16-18] 18                   Intake/Output last 24 hours:      Intake/Output Summary (Last 24 hours) at 2024 1047  Last data filed at 2024 1120  Gross per 24 hour   Intake --   Output 550 ml   Net -550 ml       Intake/Output this shift:    No intake/output data recorded.    Physical Exam:  General: No acute distress   Heart RRR   Lungs CTAB     Abdomen Soft, non tender, fundus firm   Extremities Exam of extremities: pedal edema tr +       Laboratory Results:    WBC   Date Value Ref Range Status   2024 6.98 3.40 - 10.80 10*3/mm3 Final     RBC   Date Value Ref Range Status   2024 3.55 (L) 3.77 - 5.28 10*6/mm3 Final     Hemoglobin   Date Value Ref Range Status   2024 9.3 (L) 12.0 - 15.9 g/dL Final     Hematocrit   Date Value Ref Range Status   2024 28.0 (L) 34.0 - 46.6 % Final     MCV   Date Value Ref Range Status   2024 91.0 79.0 - 97.0 fL Final     MCH   Date Value Ref Range Status   2024 31.0 26.6 - 33.0 pg Final     MCHC   Date Value Ref Range Status   2024 34.1 31.5 - 35.7 g/dL Final     RDW   Date Value Ref Range Status   2024 12.7 12.3 - 15.4 % Final     RDW-SD   Date Value Ref Range Status   2024 41.8 37.0 - 54.0 fl Final     MPV   Date Value Ref Range Status   2024 10.9 6.0 - 12.0 fL Final     Platelets   Date Value Ref Range Status   2024 233 140 - 450 10*3/mm3 Final         Assessment/Plan: PPD 1 s/p   1.RPPC: Advance care  2. Mild anemia from acute blood loss: asx start iron  3. H/o PP CM: Bps have been normal. Will monitor edema  4. Dc home tomorrow          Jen Melo MD  2024  10:47 EDT

## 2024-06-22 NOTE — ANESTHESIA POSTPROCEDURE EVALUATION
Patient: Karen Garnica    Procedure Summary       Date: 06/21/24 Room / Location:     Anesthesia Start: 0608 Anesthesia Stop: 0910    Procedure: LABOR ANALGESIA Diagnosis:     Scheduled Providers:  Provider: Prince Mac DO    Anesthesia Type: epidural ASA Status: 2            Anesthesia Type: epidural    Vitals  Vitals Value Taken Time   /89 06/22/24 0856   Temp 98.4 °F (36.9 °C) 06/22/24 0856   Pulse 85 06/22/24 0856   Resp 18 06/22/24 0856   SpO2 97 % 06/21/24 0819           Post Anesthesia Care and Evaluation    Patient location during evaluation: bedside  Patient participation: complete - patient participated  Level of consciousness: awake and awake and alert  Pain score: 0  Pain management: satisfactory to patient    Airway patency: patent  Anesthetic complications: No anesthetic complications  PONV Status: none  Cardiovascular status: acceptable, hemodynamically stable and stable  Respiratory status: acceptable  Hydration status: stable  Post Neuraxial Block status: Motor and sensory function returned to baseline and No signs or symptoms of PDPH

## 2024-06-23 VITALS
WEIGHT: 199 LBS | OXYGEN SATURATION: 97 % | HEART RATE: 79 BPM | BODY MASS INDEX: 35.26 KG/M2 | DIASTOLIC BLOOD PRESSURE: 74 MMHG | TEMPERATURE: 98.3 F | RESPIRATION RATE: 16 BRPM | HEIGHT: 63 IN | SYSTOLIC BLOOD PRESSURE: 140 MMHG

## 2024-06-23 RX ORDER — PSEUDOEPHEDRINE HCL 30 MG
100 TABLET ORAL 2 TIMES DAILY
Qty: 60 CAPSULE | Refills: 0 | Status: SHIPPED | OUTPATIENT
Start: 2024-06-23

## 2024-06-23 RX ORDER — IBUPROFEN 600 MG/1
600 TABLET ORAL EVERY 6 HOURS PRN
Qty: 60 TABLET | Refills: 0 | Status: SHIPPED | OUTPATIENT
Start: 2024-06-23

## 2024-06-23 RX ADMIN — DOCUSATE SODIUM 100 MG: 100 CAPSULE, LIQUID FILLED ORAL at 08:05

## 2024-06-23 RX ADMIN — IBUPROFEN 600 MG: 600 TABLET, FILM COATED ORAL at 08:05

## 2024-06-23 RX ADMIN — PRENATAL VITAMINS-IRON FUMARATE 27 MG IRON-FOLIC ACID 0.8 MG TABLET 1 TABLET: at 08:05

## 2024-06-23 RX ADMIN — FERROUS SULFATE TAB 325 MG (65 MG ELEMENTAL FE) 325 MG: 325 (65 FE) TAB at 08:05

## 2024-06-23 NOTE — DISCHARGE SUMMARY
[facility]  Discharge Summary      Patient: Karen Garnica            : 1985  MRN: [id number]  CSN: [id number]  Consult:   Consults       No orders found from 2024 to 2024.               Gestational Age at Discharge:  35w3d, delivered      Admission  Diagnosis: <principal problem not specified>    Discharge Diagnosis:   Patient Active Problem List   Diagnosis    HSIL (high grade squamous intraepithelial lesion) on Pap smear of cervix    AMA - normal cfDNA    Dichorionic diamniotic twin pregnancy, antepartum    History of maternal cardiomyopathy, currently pregnant    History of gestational diabetes mellitus (GDM)    History of pre-eclampsia in prior pregnancy    Gestational hypertension    Flank pain    Fever    Pyelonephritis during pregnancy, antepartum    Nonrheumatic mitral valve regurgitation    Gestational hypertension, third trimester     (spontaneous vaginal delivery)       Date of Admission: 2024    Date of Discharge:  24    Procedures:   of Twin gestation           Service:  Obstetrics    Hospital Course:       Pt admitted for  IOL  after pregnancy c/b DA/DC Twin gestation with IUGR of Twin A and elevated UAD.. She was delivered without complication. She delivered a 2 VFI with Apgars 8/9 and 8/9 via . See note for full detail. Her postpartum course was uncomplicated and was discharged home on PPD 2    Labs:    Lab Results (last 24 hours)       ** No results found for the last 24 hours. **          HOSPITAL LABS:  Lab Results   Component Value Date    WBC 6.98 2024    HGB 9.3 (L) 2024    HCT 28.0 (L) 2024    MCV 91.0 2024     2024    POCGLU 112 2023    CREATININE 0.48 (L) 2024    URICACID 5.8 (H) 2024    AST 22 2024    ALT 22 2024     2024     Results from last 7 days   Lab Units 24  1053   ABO TYPING  O   RH TYPING  Positive   ANTIBODY SCREEN  Negative       IMAGING  XR Chest 1  View    Result Date: 6/4/2024  Impression: Impression: No acute process identified Electronically Signed: Anastacio Novak MD  6/4/2024 4:58 PM EDT  Workstation ID: [id number]    US Renal Bilateral    Result Date: 6/4/2024  Impression: Impression: 1. Kidneys in the upper range of normal size, larger right kidney than left, but no evidence of obstructive uropathy. 2. Prominent left renal cortical thickness as noted. No focal renal cortical lesion or other focal abnormality is demonstrated of either kidney. Electronically Signed: Tez Huynh MD  6/4/2024 1:16 PM EDT  Workstation ID: [id number]      Discharge Medications     Discharge Medications        New Medications        Instructions Start Date   docusate sodium 100 MG capsule   100 mg, Oral, 2 Times Daily      ibuprofen 600 MG tablet  Commonly known as: ADVIL,MOTRIN   600 mg, Oral, Every 6 Hours PRN             Continue These Medications        Instructions Start Date   ClearLax 17 GM/SCOOP powder  Generic drug: polyethylene glycol   17 g, Oral, 2 Times Daily PRN      ferrous sulfate 325 (65 FE) MG tablet   325 mg, Oral, Daily With Breakfast      Prenatal Vitamin 27-0.8 MG tablet   1 tablet, Oral, Daily             Stop These Medications      ampicillin 500 MG capsule  Commonly known as: PRINCIPEN     fluconazole 150 MG tablet  Commonly known as: DIFLUCAN     magnesium (as) gluconate 500 (27 Mg) MG tablet  Commonly known as: MAGONATE     nitrofurantoin (macrocrystal-monohydrate) 100 MG capsule  Commonly known as: MACROBID     ondansetron ODT 4 MG disintegrating tablet  Commonly known as: ZOFRAN-ODT              Allergies: No Known Allergies     Discharge Disposition:  To Home    Discharge Condition:  Stable    Discharge Diet: Regular    Activity at Discharge: pelvic rest,     Follow-up Appointments: 2 wk for BP check        Jen Melo MD  06/23/24  10:14 EDT

## 2024-06-23 NOTE — PROGRESS NOTES
[facility]  Obstetric Progress Note    Chief Complaint: PPD 2    Subjective     Feeling well. Pain controlled. madi diet. +amb/void. Lochia appr  Objective     Vital Signs Range for the last 24 hours  Temp:  [98.3 °F (36.8 °C)-98.9 °F (37.2 °C)] 98.3 °F (36.8 °C)   BP: (130-140)/(74-80) 140/74   Heart Rate:  [79-89] 79   Resp:  [16-20] 16           Device (Oxygen Therapy): room air       Intake/Output last 24 hours:    No intake or output data in the 24 hours ending 24 1013    Intake/Output this shift:    No intake/output data recorded.    Physical Exam:  General: No acute distress   Heart RRR   Lungs CTAB     Abdomen Soft, non tender, fundus firm   Extremities Exam of extremities: peripheral pulses normal, no pedal edema, no clubbing or cyanosis, pedal edema tr +       Laboratory Results:  No new      Assessment/Plan: PPD 25 s/p   1.RPPC: Advance care  2. Mild anemia from acute blood loss: asx start iron  3. H/o PP CM: Bps have been normal. Will monitor edema  4. Dc home today           Jen eMlo MD  2024  10:13 EDT

## 2024-07-02 ENCOUNTER — MATERNAL SCREENING (OUTPATIENT)
Dept: CALL CENTER | Facility: HOSPITAL | Age: 39
End: 2024-07-02
Payer: COMMERCIAL

## 2024-07-02 NOTE — OUTREACH NOTE
Maternal Screening Survey      Flowsheet Row Responses   Facility patient discharged from? [facility]   Attempt successful? Yes   Call start time 1147   Call end time 1152   EPD Scale: Able to Laugh 0-->as much as she always could   EPD Scale: Looked Forward 0-->as much as she ever did   EPD Scale: Blamed Self 0-->no, never   EPD Scale: Been Anxious 0-->no, not at all   EPD Scale: Felt Panicky 0-->no, not at all   EPD Scale: Things Getting on Top 0-->no, has been coping as well as ever   EPD Scale: Difficulty Sleeping 0-->no, not at all   EPD Scale: Sad or Miserable 1-->not very often   EPD Scale: Crying 1-->only occasionally   EPD Scale: Thought of Harming Self 0-->never   [place]  Depression Score 2   Did any of your parents have problems with alcohol or drug use? No   Do any of your peers have problems with alcohol or drug use? No   Does your partner have problems with alcohol or drug use? No   Before you were pregnant did you have problems with alcohol or drug use? (past) No   In the past month, did you drink beer, wine, liquor or use any other drugs? (pregnancy) No   Maternal Screening call completed Yes   Call end time 1152              Brionna MARTINEZ - Registered Nurse

## 2024-08-29 ENCOUNTER — TRANSCRIBE ORDERS (OUTPATIENT)
Dept: LAB | Facility: HOSPITAL | Age: 39
End: 2024-08-29
Payer: COMMERCIAL

## 2024-08-29 ENCOUNTER — LAB (OUTPATIENT)
Dept: LAB | Facility: HOSPITAL | Age: 39
End: 2024-08-29
Payer: COMMERCIAL

## 2024-08-29 DIAGNOSIS — Z01.419 ROUTINE GYNECOLOGICAL EXAMINATION: Primary | ICD-10-CM

## 2024-08-29 DIAGNOSIS — Z01.419 ROUTINE GYNECOLOGICAL EXAMINATION: ICD-10-CM

## 2024-08-29 LAB
ALBUMIN SERPL-MCNC: 4.3 G/DL (ref 3.5–5.2)
ALBUMIN/GLOB SERPL: 1.6 G/DL
ALP SERPL-CCNC: 64 U/L (ref 39–117)
ALT SERPL W P-5'-P-CCNC: 23 U/L (ref 1–33)
ANION GAP SERPL CALCULATED.3IONS-SCNC: 11.1 MMOL/L (ref 5–15)
AST SERPL-CCNC: 21 U/L (ref 1–32)
BILIRUB SERPL-MCNC: <0.2 MG/DL (ref 0–1.2)
BUN SERPL-MCNC: 21 MG/DL (ref 6–20)
BUN/CREAT SERPL: 26.3 (ref 7–25)
CALCIUM SPEC-SCNC: 9.4 MG/DL (ref 8.6–10.5)
CHLORIDE SERPL-SCNC: 104 MMOL/L (ref 98–107)
CO2 SERPL-SCNC: 23.9 MMOL/L (ref 22–29)
CREAT SERPL-MCNC: 0.8 MG/DL (ref 0.57–1)
DEPRECATED RDW RBC AUTO: 43.3 FL (ref 37–54)
EGFRCR SERPLBLD CKD-EPI 2021: 96.9 ML/MIN/1.73
ERYTHROCYTE [DISTWIDTH] IN BLOOD BY AUTOMATED COUNT: 13.4 % (ref 12.3–15.4)
GLOBULIN UR ELPH-MCNC: 2.7 GM/DL
GLUCOSE SERPL-MCNC: 86 MG/DL (ref 65–99)
HCT VFR BLD AUTO: 39.8 % (ref 34–46.6)
HGB BLD-MCNC: 13.4 G/DL (ref 12–15.9)
MCH RBC QN AUTO: 30 PG (ref 26.6–33)
MCHC RBC AUTO-ENTMCNC: 33.7 G/DL (ref 31.5–35.7)
MCV RBC AUTO: 89.2 FL (ref 79–97)
PLATELET # BLD AUTO: 209 10*3/MM3 (ref 140–450)
PMV BLD AUTO: 10.8 FL (ref 6–12)
POTASSIUM SERPL-SCNC: 4.5 MMOL/L (ref 3.5–5.2)
PROT SERPL-MCNC: 7 G/DL (ref 6–8.5)
RBC # BLD AUTO: 4.46 10*6/MM3 (ref 3.77–5.28)
SODIUM SERPL-SCNC: 139 MMOL/L (ref 136–145)
WBC NRBC COR # BLD AUTO: 5.53 10*3/MM3 (ref 3.4–10.8)

## 2024-08-29 PROCEDURE — 80050 GENERAL HEALTH PANEL: CPT

## 2024-08-29 PROCEDURE — 82607 VITAMIN B-12: CPT

## 2024-08-29 PROCEDURE — 82306 VITAMIN D 25 HYDROXY: CPT

## 2024-08-29 PROCEDURE — 83036 HEMOGLOBIN GLYCOSYLATED A1C: CPT

## 2024-08-29 PROCEDURE — 36415 COLL VENOUS BLD VENIPUNCTURE: CPT

## 2024-08-29 PROCEDURE — 80061 LIPID PANEL: CPT

## 2024-08-30 LAB
25(OH)D3 SERPL-MCNC: 15.5 NG/ML (ref 30–100)
CHOLEST SERPL-MCNC: 160 MG/DL (ref 0–200)
HBA1C MFR BLD: 5.1 % (ref 4.8–5.6)
HDLC SERPL QL: 2.81
HDLC SERPL-MCNC: 57 MG/DL (ref 40–60)
LDLC SERPL CALC-MCNC: 74 MG/DL (ref 0–100)
TRIGL SERPL-MCNC: 170 MG/DL (ref 0–150)
TSH SERPL DL<=0.05 MIU/L-ACNC: 1.98 UIU/ML (ref 0.27–4.2)
VIT B12 BLD-MCNC: 513 PG/ML (ref 211–946)
VLDLC SERPL-MCNC: 29 MG/DL (ref 5–40)

## 2025-03-12 ENCOUNTER — OFFICE VISIT (OUTPATIENT)
Age: 40
End: 2025-03-12
Payer: COMMERCIAL

## 2025-03-12 VITALS
DIASTOLIC BLOOD PRESSURE: 82 MMHG | SYSTOLIC BLOOD PRESSURE: 140 MMHG | OXYGEN SATURATION: 92 % | WEIGHT: 182 LBS | BODY MASS INDEX: 31.07 KG/M2 | HEART RATE: 92 BPM | HEIGHT: 64 IN | RESPIRATION RATE: 18 BRPM

## 2025-03-12 DIAGNOSIS — F41.9 ANXIETY: ICD-10-CM

## 2025-03-12 DIAGNOSIS — E66.811 CLASS 1 OBESITY DUE TO EXCESS CALORIES WITH SERIOUS COMORBIDITY AND BODY MASS INDEX (BMI) OF 30.0 TO 30.9 IN ADULT: ICD-10-CM

## 2025-03-12 DIAGNOSIS — F32.1 CURRENT MODERATE EPISODE OF MAJOR DEPRESSIVE DISORDER, UNSPECIFIED WHETHER RECURRENT: Primary | ICD-10-CM

## 2025-03-12 DIAGNOSIS — E66.09 CLASS 1 OBESITY DUE TO EXCESS CALORIES WITH SERIOUS COMORBIDITY AND BODY MASS INDEX (BMI) OF 30.0 TO 30.9 IN ADULT: ICD-10-CM

## 2025-03-12 DIAGNOSIS — I51.89 CARDIAC DYSFUNCTION: ICD-10-CM

## 2025-03-12 DIAGNOSIS — Z86.32 HISTORY OF GESTATIONAL DIABETES MELLITUS (GDM): ICD-10-CM

## 2025-03-12 DIAGNOSIS — Z23 IMMUNIZATION DUE: ICD-10-CM

## 2025-03-12 PROCEDURE — 99214 OFFICE O/P EST MOD 30 MIN: CPT | Performed by: NURSE PRACTITIONER

## 2025-03-12 RX ORDER — SEMAGLUTIDE 0.25 MG/.5ML
0.25 INJECTION, SOLUTION SUBCUTANEOUS WEEKLY
Qty: 2 ML | Refills: 1 | Status: SHIPPED | OUTPATIENT
Start: 2025-03-12

## 2025-03-12 RX ORDER — HYDROXYZINE HYDROCHLORIDE 25 MG/1
25 TABLET, FILM COATED ORAL EVERY 8 HOURS PRN
Qty: 30 TABLET | Refills: 2 | Status: SHIPPED | OUTPATIENT
Start: 2025-03-12

## 2025-03-12 NOTE — PATIENT INSTRUCTIONS
Obesity, Adult  Obesity is the condition of having too much total body fat. Being overweight or obese means that your weight is greater than what is considered healthy for your body size. Obesity is determined by a measurement called BMI (body mass index). BMI is an estimate of body fat and is calculated from height and weight. For adults, a BMI of 30 or higher is considered obese.  Obesity can lead to other health concerns and major illnesses, including:  Stroke.  Coronary artery disease (CAD).  Type 2 diabetes.  Some types of cancer, including cancers of the colon, breast, uterus, and gallbladder.  High blood pressure (hypertension).  High cholesterol.  Gallbladder stones.  Obesity can also contribute to:  Osteoarthritis.  Sleep apnea.  Infertility problems.  What are the causes?  Common causes of this condition include:  Eating daily meals that are high in calories, sugar, and fat.  Drinking high amounts of sugar-sweetened beverages, such as soft drinks.  Being born with genes that may make you more likely to become obese.  Having a medical condition that causes obesity, including:  Hypothyroidism.  Polycystic ovarian syndrome (PCOS).  Binge-eating disorder.  Cushing syndrome.  Taking certain medicines, such as steroids, antidepressants, and seizure medicines.  Not being physically active (sedentary lifestyle).  Not getting enough sleep.  What increases the risk?  The following factors may make you more likely to develop this condition:  Having a family history of obesity.  Living in an area with limited access to:  [place], recreation centers, or sidewalks.  Healthy food choices, such as grocery stores and [employer]' markets.  What are the signs or symptoms?  The main sign of this condition is having too much body fat.  How is this diagnosed?  This condition is diagnosed based on:  Your BMI. If you are an adult with a BMI of 30 or higher, you are considered obese.  Your waist circumference. This measures the  distance around your waistline.  Your skinfold thickness. Your health care provider may gently pinch a fold of your skin and measure it.  You may have other tests to check for underlying conditions.  How is this treated?  Treatment for this condition often includes changing your lifestyle. Treatment may include some or all of the following:  Dietary changes. This may include developing a healthy meal plan.  Regular physical activity. This may include activity that causes your heart to beat faster (aerobic exercise) and strength training. Work with your health care provider to design an exercise program that works for you.  Medicine to help you lose weight if you are unable to lose one pound a week after six weeks of healthy eating and more physical activity.  Treating conditions that cause the obesity (underlying conditions).  Surgery. Surgical options may include gastric banding and gastric bypass. Surgery may be done if:  Other treatments have not helped to improve your condition.  You have a BMI of 40 or higher.  You have life-threatening health problems related to obesity.  Follow these instructions at home:  Eating and drinking    Follow recommendations from your health care provider about what you eat and drink. Your health care provider may advise you to:  Limit fast food, sweets, and processed snack foods.  Choose low-fat options, such as low-fat milk instead of whole milk.  Eat five or more servings of fruits or vegetables every day.  Choose healthy foods when you eat out.  Keep low-fat snacks available.  Limit sugary drinks, such as soda, fruit juice, sweetened iced tea, and flavored milk.  Drink enough water to keep your urine pale yellow.  Do not follow a fad diet. Fad diets can be unhealthy and even dangerous.  Other healthful choices include:  Eat at home more often. This gives you more control over what you eat.  Learn to read food labels. This will help you understand how much food is considered one  serving.  Learn what a healthy serving size is.  Physical activity  Exercise regularly, as told by your health care provider.  Most adults should get up to 150 minutes of moderate-intensity exercise every week.  Ask your health care provider what types of exercise are safe for you and how often you should exercise.  Warm up and stretch before being active.  Cool down and stretch after being active.  Rest between periods of activity.  Lifestyle  Work with your health care provider and a dietitian to set a weight-loss goal that is healthy and reasonable for you.  Limit your screen time.  Find ways to reward yourself that do not involve food.  Do not drink alcohol if:  Your health care provider tells you not to drink.  You are pregnant, may be pregnant, or are planning to become pregnant.  If you drink alcohol:  Limit how much you have to:  0-1 drink a day for women.  0-2 drinks a day for men.  Know how much alcohol is in your drink. In the [place] one drink equals one 12 oz bottle of beer (355 mL), one 5 oz glass of wine (148 mL), or one 1½ oz glass of hard liquor (44 mL).  General instructions  Keep a weight-loss journal to keep track of the food you eat and how much exercise you get.  Take over-the-counter and prescription medicines only as told by your health care provider.  Take vitamins and supplements only as told by your health care provider.  Consider joining a support group. Your health care provider may be able to recommend a support group.  Pay attention to your mental health as obesity can lead to depression or self esteem issues.  Keep all follow-up visits. This is important.  Contact a health care provider if:  You are unable to meet your weight-loss goal after six weeks of dietary and lifestyle changes.  You have trouble breathing.  Summary  Obesity is the condition of having too much total body fat.  Being overweight or obese means that your weight is greater than what is considered healthy for your body  size.  Work with your health care provider and a dietitian to set a weight-loss goal that is healthy and reasonable for you.  Exercise regularly, as told by your health care provider. Ask your health care provider what types of exercise are safe for you and how often you should exercise.  This information is not intended to replace advice given to you by your health care provider. Make sure you discuss any questions you have with your health care provider.  Document Revised: 07/26/2022 Document Reviewed: 07/26/2022  Elsevier Patient Education © 2024 [employer]

## 2025-03-12 NOTE — PROGRESS NOTES
Chief Complaint   Patient presents with    Anxiety    Stress    Panic Attack       Subjective   Karen Garnica is a 39 y.o. female    Anxiety   Patient reports no chest pain, dizziness, nausea, nervous/anxious behavior, palpitations or shortness of breath. Her past medical history is significant for anxiety/panic attacks.   Panic Attack  Symptoms include fatigue.    Pertinent negative symptoms include no abdominal pain, no chest pain, no chills, no congestion, no cough, no fever, no headaches, no myalgias, no nausea, no neck pain, no numbness, no rash, no sore throat, no dysuria and no vomiting.     Patient today to establish care.  She has had worsening depression/anxiety issues over the past year.  Her  is currently in senior care in [facility], she has total of 6 kids that she cares for, the last ones set of twins that she had put this past June.  She is currently not breast-feeding.  She has had some financial issues and has had a lot of financial stress.  She has gained a significant amount of weight since childbirth.  At one time she was treated for anxiety/depression issues with BuSpar but this did not agree with her.  She saw [facility] about 5 years ago.  She does have a recent history when she was pregnant of having cardiomyopathy but the last echocardiogram showed a normal ejection fraction.  She also has a family history of diabetes.  She did at 1 time have some high-grade squamous intraepithelial lesion and had this removed.    No Known Allergies  Past Medical History:   Diagnosis Date    Depression     History of gestational diabetes - with 4th pregnancy 2023    Hx of postpatum cardiomyopathy 2023    LGSIL on Pap smear of cervix 07/12/2023    Tx [facility] LEEP    Panic attacks     Postpartum anxiety 2023    Preeclampsia in postpartum period 02/2015    Along with pulmonary edema      Past Surgical History:   Procedure Laterality Date    LEEP  09/2023    Path = FELICIA 2 with clear margins      Social History     Socioeconomic History    Marital status:      Spouse name: Noble    Number of children: 4   Tobacco Use    Smoking status: Never     Passive exposure: Never    Smokeless tobacco: Never   Vaping Use    Vaping status: Never Used   Substance and Sexual Activity    Alcohol use: Not Currently     Alcohol/week: 2.0 standard drinks of alcohol     Types: 2 Cans of beer per week     Comment: socially when not pregnant    Drug use: Never    Sexual activity: Not Currently     Partners: Male     Birth control/protection: None        Current Outpatient Medications:     hydrOXYzine (ATARAX) 25 MG tablet, Take 1 tablet by mouth Every 8 (Eight) Hours As Needed for Anxiety., Disp: 30 tablet, Rfl: 2    Semaglutide-Weight Management (Wegovy) 0.25 MG/0.5ML solution auto-injector, Inject 0.5 mL under the skin into the appropriate area as directed 1 (One) Time Per Week., Disp: 2 mL, Rfl: 1     Review of Systems   Constitutional:  Positive for fatigue. Negative for appetite change, chills and fever.   HENT:  Negative for congestion, ear pain, hearing loss, rhinorrhea, sinus pressure and sore throat.    Eyes:  Negative for itching and visual disturbance.   Respiratory:  Negative for cough, shortness of breath and wheezing.    Cardiovascular:  Negative for chest pain, palpitations and leg swelling.   Gastrointestinal:  Negative for abdominal pain, blood in stool, constipation, diarrhea, nausea and vomiting.   Endocrine: Negative for cold intolerance, heat intolerance, polydipsia, polyphagia and polyuria.   Genitourinary:  Positive for frequency (Since twins). Negative for difficulty urinating, dysuria, hematuria and menstrual problem (LMP- 3 weeks, normal, Last PAP- 1 yr ago).   Musculoskeletal:  Negative for arthralgias, back pain, joint swelling, myalgias and neck pain.   Skin:  Negative for rash and wound.   Allergic/Immunologic: Negative for environmental allergies and food allergies.   Neurological:   Negative for dizziness, light-headedness, numbness and headaches.   Hematological:  Negative for adenopathy. Does not bruise/bleed easily.   Psychiatric/Behavioral:  Positive for dysphoric mood. Negative for sleep disturbance. The patient is not nervous/anxious.        Objective     Vitals:    03/12/25 1348   BP: 140/82   Pulse: 92   Resp: 18   SpO2: 92%         03/12/25  1348   Weight: 82.6 kg (182 lb)     Body mass index is 30.77 kg/m².  Results for orders placed or performed in visit on 08/29/24   CBC (No Diff)    Collection Time: 08/29/24  4:43 PM    Specimen: Blood   Result Value Ref Range    WBC 5.53 3.40 - 10.80 10*3/mm3    RBC 4.46 3.77 - 5.28 10*6/mm3    Hemoglobin 13.4 12.0 - 15.9 g/dL    Hematocrit 39.8 34.0 - 46.6 %    MCV 89.2 79.0 - 97.0 fL    MCH 30.0 26.6 - 33.0 pg    MCHC 33.7 31.5 - 35.7 g/dL    RDW 13.4 12.3 - 15.4 %    RDW-SD 43.3 37.0 - 54.0 fl    MPV 10.8 6.0 - 12.0 fL    Platelets 209 140 - 450 10*3/mm3   Comprehensive Metabolic Panel    Collection Time: 08/29/24  4:43 PM    Specimen: Blood   Result Value Ref Range    Glucose 86 65 - 99 mg/dL    BUN 21 (H) 6 - 20 mg/dL    Creatinine 0.80 0.57 - 1.00 mg/dL    Sodium 139 136 - 145 mmol/L    Potassium 4.5 3.5 - 5.2 mmol/L    Chloride 104 98 - 107 mmol/L    CO2 23.9 22.0 - 29.0 mmol/L    Calcium 9.4 8.6 - 10.5 mg/dL    Total Protein 7.0 6.0 - 8.5 g/dL    Albumin 4.3 3.5 - 5.2 g/dL    ALT (SGPT) 23 1 - 33 U/L    AST (SGOT) 21 1 - 32 U/L    Alkaline Phosphatase 64 39 - 117 U/L    Total Bilirubin <0.2 0.0 - 1.2 mg/dL    Globulin 2.7 gm/dL    A/G Ratio 1.6 g/dL    BUN/Creatinine Ratio 26.3 (H) 7.0 - 25.0    Anion Gap 11.1 5.0 - 15.0 mmol/L    eGFR 96.9 >60.0 mL/min/1.73   Hemoglobin A1c    Collection Time: 08/29/24  4:43 PM    Specimen: Blood   Result Value Ref Range    Hemoglobin A1C 5.10 4.80 - 5.60 %   TSH    Collection Time: 08/29/24  4:43 PM    Specimen: Blood   Result Value Ref Range    TSH 1.980 0.270 - 4.200 uIU/mL   Lipid Panel With / Chol /  HDL Ratio    Collection Time: 08/29/24  4:43 PM    Specimen: Blood   Result Value Ref Range    Total Cholesterol 160 0 - 200 mg/dL    Triglycerides 170 (H) 0 - 150 mg/dL    HDL Cholesterol 57 40 - 60 mg/dL    LDL Cholesterol  74 0 - 100 mg/dL    VLDL Cholesterol 29 5 - 40 mg/dL    Chol/HDL Ratio 2.81    Vitamin B12    Collection Time: 08/29/24  4:43 PM    Specimen: Blood   Result Value Ref Range    Vitamin B-12 513 211 - 946 pg/mL   Vitamin D 25 Hydroxy    Collection Time: 08/29/24  4:43 PM    Specimen: Blood   Result Value Ref Range    25 Hydroxy, Vitamin D 15.5 (L) 30.0 - 100.0 ng/ml       Physical Exam  Vitals reviewed.   Constitutional:       Appearance: She is well-developed.   HENT:      Head: Normocephalic and atraumatic.   Cardiovascular:      Rate and Rhythm: Normal rate and regular rhythm.      Heart sounds: Normal heart sounds.   Pulmonary:      Effort: Pulmonary effort is normal.      Breath sounds: Normal breath sounds.   Skin:     General: Skin is warm and dry.   Neurological:      Mental Status: She is alert and oriented to person, place, and time.   Psychiatric:         Behavior: Behavior normal.       Assessment & Plan   Problems Addressed this Visit          Endocrine and Metabolic    History of gestational diabetes mellitus (GDM)    Relevant Medications    Semaglutide-Weight Management (Wegovy) 0.25 MG/0.5ML solution auto-injector       Mental Health    Anxiety    Relevant Medications    hydrOXYzine (ATARAX) 25 MG tablet    Other Relevant Orders    Ambulatory Referral to Psychiatry    Current moderate episode of major depressive disorder - Primary    Relevant Medications    Semaglutide-Weight Management (Wegovy) 0.25 MG/0.5ML solution auto-injector    hydrOXYzine (ATARAX) 25 MG tablet    Other Relevant Orders    GeneSight - Swab,     Other Visit Diagnoses         Immunization due        Relevant Orders    Fluzone >6mos ([id number])      Class 1 obesity due to excess calories with serious  comorbidity and body mass index (BMI) of 30.0 to 30.9 in adult        Relevant Medications    Semaglutide-Weight Management (Wegovy) 0.25 MG/0.5ML solution auto-injector      Cardiac dysfunction        Relevant Medications    Semaglutide-Weight Management (Wegovy) 0.25 MG/0.5ML solution auto-injector          Diagnoses         Codes Comments      Current moderate episode of major depressive disorder, unspecified whether recurrent    -  Primary ICD-10-CM: F32.1  ICD-9-CM: 296.22       Immunization due     ICD-10-CM: Z23  ICD-9-CM: V05.9       Anxiety     ICD-10-CM: F41.9  ICD-9-CM: 300.00       Class 1 obesity due to excess calories with serious comorbidity and body mass index (BMI) of 30.0 to 30.9 in adult     ICD-10-CM: E66.811, E66.09, Z68.30  ICD-9-CM: 278.00, V85.30       Cardiac dysfunction     ICD-10-CM: I51.89  ICD-9-CM: 427.9       History of gestational diabetes mellitus (GDM)     ICD-10-CM: Z86.32  ICD-9-CM: V12.21           We will give her a trial of Wegovy 0.25 mg weekly. Discussed need for weight management.  I recommend they use a weight loss pascual on their phone, eat no more than 5801-1775 marco/day, or use intermittent fasting and exercise 30 to 60 minutes 3 times a week.  Discussed weight loss with diet, recommend Weight Watchers or Mediterranean Diet, but stated that whatever method works for this patient is best. The patient agrees with all recommendations at this time. I have discussed a weight loss plan to be determined by this patient.     For anxiety I will start her on hydroxyzine for now but will go ahead and get a GeneSight test as well as refer her to psychiatry.  She reports she has no plan to harm herself.  She realizes she needs to be there for her children. Patient was given instructions on medication prescribed, including how to take, common side effects, and when it will have full effectiveness. The patient was instructed to call my office if any side effects are noted. The patient was  also instructed to stop this medication if their symptoms worsen, their mood worsens, or any desire to hurt someone or themselves. Patient was given my card and informed how to contact me if needed or call my office.     Time spent on visit, including counseling, education, reviewing the chart, and any recent test results, was   35     Minutes    Return visit in 2 weeks    Counseling provided on weight management and mental health concerns.    Shane Barnhart Marcia, APRN   3/12/2025   15:03 EDT     Please note that portions of this document were completed with a voice recognition program.     At [facility], we believe that sharing information builds trust and better relationships. You are receiving this note because you are receiving care at [facility] or have recently visited. It is possible you will see health information before a provider has talked with you about it. This kind of information can be easy to misunderstand as it is a medical document. It is intended as esuw-wr-exee communication. It is written in medical language and may contain abbreviations or verbiage that are unfamiliar. It may appear blunt or direct. Medical documents are intended to carry relevant information, facts as evident, and the clinical opinion of the practitioner.  To help you fully understand what it means for your health, we urge you to discuss this note with your provider.

## 2025-03-13 ENCOUNTER — PRIOR AUTHORIZATION (OUTPATIENT)
Age: 40
End: 2025-03-13
Payer: COMMERCIAL

## 2025-03-14 ENCOUNTER — TELEPHONE (OUTPATIENT)
Age: 40
End: 2025-03-14
Payer: COMMERCIAL

## 2025-03-14 ENCOUNTER — PATIENT ROUNDING (BHMG ONLY) (OUTPATIENT)
Age: 40
End: 2025-03-14
Payer: COMMERCIAL

## 2025-03-14 NOTE — TELEPHONE ENCOUNTER
Our guideline named WEGOVY requires the following rule(s) be met for approval:  The medication is being prescribed to lower the risk of death from cardiovascular [heart] causes, myocardial infarction [heart attack], or stroke [a type of brain damage]. Please note:   Wegovy used for weight loss is considered a benefit exclusion pursuant to the Social Security Act 1927(D)(2). Please note that there are additional requirements for the above   listed diagnoses.  This is why your request is denied.

## 2025-03-14 NOTE — TELEPHONE ENCOUNTER
Relay    Called and lvm for patient. Appt scheduled Monday 3/17 with [facility] at 10:15am. Sending patient mychart message with address and phone number